# Patient Record
Sex: FEMALE | Race: WHITE | NOT HISPANIC OR LATINO | Employment: OTHER | URBAN - METROPOLITAN AREA
[De-identification: names, ages, dates, MRNs, and addresses within clinical notes are randomized per-mention and may not be internally consistent; named-entity substitution may affect disease eponyms.]

---

## 2021-01-01 ENCOUNTER — APPOINTMENT (OUTPATIENT)
Dept: NEUROLOGY | Facility: HOSPITAL | Age: 76
End: 2021-01-01

## 2021-01-01 ENCOUNTER — APPOINTMENT (OUTPATIENT)
Dept: GENERAL RADIOLOGY | Facility: HOSPITAL | Age: 76
End: 2021-01-01

## 2021-01-01 ENCOUNTER — ANESTHESIA EVENT (OUTPATIENT)
Dept: PERIOP | Facility: HOSPITAL | Age: 76
End: 2021-01-01

## 2021-01-01 ENCOUNTER — APPOINTMENT (OUTPATIENT)
Dept: CT IMAGING | Facility: HOSPITAL | Age: 76
End: 2021-01-01

## 2021-01-01 ENCOUNTER — ANESTHESIA (OUTPATIENT)
Dept: PERIOP | Facility: HOSPITAL | Age: 76
End: 2021-01-01

## 2021-01-01 ENCOUNTER — APPOINTMENT (OUTPATIENT)
Dept: CARDIOLOGY | Facility: HOSPITAL | Age: 76
End: 2021-01-01

## 2021-01-01 ENCOUNTER — APPOINTMENT (OUTPATIENT)
Dept: MRI IMAGING | Facility: HOSPITAL | Age: 76
End: 2021-01-01

## 2021-01-01 ENCOUNTER — HOSPITAL ENCOUNTER (INPATIENT)
Facility: HOSPITAL | Age: 76
LOS: 30 days | Discharge: NURSING, REHAB OR LONG-TERM CARE | End: 2021-02-07
Attending: EMERGENCY MEDICINE | Admitting: INTERNAL MEDICINE

## 2021-01-01 ENCOUNTER — HOSPITAL ENCOUNTER (INPATIENT)
Facility: HOSPITAL | Age: 76
LOS: 4 days | End: 2021-02-12
Attending: INTERNAL MEDICINE | Admitting: INTERNAL MEDICINE

## 2021-01-01 VITALS
DIASTOLIC BLOOD PRESSURE: 46 MMHG | RESPIRATION RATE: 42 BRPM | HEIGHT: 66 IN | BODY MASS INDEX: 39.7 KG/M2 | OXYGEN SATURATION: 94 % | HEART RATE: 105 BPM | WEIGHT: 247 LBS | SYSTOLIC BLOOD PRESSURE: 117 MMHG | TEMPERATURE: 97.3 F

## 2021-01-01 VITALS
HEART RATE: 105 BPM | HEIGHT: 66 IN | TEMPERATURE: 97.6 F | SYSTOLIC BLOOD PRESSURE: 138 MMHG | OXYGEN SATURATION: 62 % | DIASTOLIC BLOOD PRESSURE: 72 MMHG | RESPIRATION RATE: 28 BRPM | WEIGHT: 262.35 LBS | BODY MASS INDEX: 42.16 KG/M2

## 2021-01-01 DIAGNOSIS — J93.9 PNEUMOTHORAX, RIGHT: ICD-10-CM

## 2021-01-01 DIAGNOSIS — U07.1 PNEUMONIA DUE TO COVID-19 VIRUS: ICD-10-CM

## 2021-01-01 DIAGNOSIS — J12.82 PNEUMONIA DUE TO COVID-19 VIRUS: ICD-10-CM

## 2021-01-01 DIAGNOSIS — J96.01 ACUTE HYPOXEMIC RESPIRATORY FAILURE (HCC): Primary | ICD-10-CM

## 2021-01-01 DIAGNOSIS — J93.9 PNEUMOTHORAX, LEFT: ICD-10-CM

## 2021-01-01 LAB
1,3 BETA GLUCAN SER-MCNC: 31 PG/ML
ABO GROUP BLD: NORMAL
ALBUMIN SERPL-MCNC: 2.1 G/DL (ref 3.5–5.2)
ALBUMIN SERPL-MCNC: 2.2 G/DL (ref 3.5–5.2)
ALBUMIN SERPL-MCNC: 2.3 G/DL (ref 3.5–5.2)
ALBUMIN SERPL-MCNC: 2.4 G/DL (ref 3.5–5.2)
ALBUMIN SERPL-MCNC: 2.5 G/DL (ref 3.5–5.2)
ALBUMIN SERPL-MCNC: 2.6 G/DL (ref 3.5–5.2)
ALBUMIN SERPL-MCNC: 2.7 G/DL (ref 3.5–5.2)
ALBUMIN SERPL-MCNC: 2.8 G/DL (ref 3.5–5.2)
ALBUMIN SERPL-MCNC: 2.9 G/DL (ref 3.5–5.2)
ALBUMIN SERPL-MCNC: 3 G/DL (ref 3.5–5.2)
ALBUMIN SERPL-MCNC: 3.1 G/DL (ref 3.5–5.2)
ALBUMIN SERPL-MCNC: 3.5 G/DL (ref 3.5–5.2)
ALBUMIN SERPL-MCNC: 3.7 G/DL (ref 3.5–5.2)
ALBUMIN/GLOB SERPL: 0.6 G/DL
ALBUMIN/GLOB SERPL: 0.7 G/DL
ALBUMIN/GLOB SERPL: 0.8 G/DL
ALBUMIN/GLOB SERPL: 0.8 G/DL
ALBUMIN/GLOB SERPL: 0.9 G/DL
ALBUMIN/GLOB SERPL: 1 G/DL
ALBUMIN/GLOB SERPL: 1.1 G/DL
ALBUMIN/GLOB SERPL: 1.2 G/DL
ALBUMIN/GLOB SERPL: 1.3 G/DL
ALBUMIN/GLOB SERPL: 1.4 G/DL
ALBUMIN/GLOB SERPL: 1.5 G/DL
ALBUMIN/GLOB SERPL: 1.6 G/DL
ALBUMIN/GLOB SERPL: 1.8 G/DL
ALP SERPL-CCNC: 102 U/L (ref 39–117)
ALP SERPL-CCNC: 102 U/L (ref 39–117)
ALP SERPL-CCNC: 103 U/L (ref 39–117)
ALP SERPL-CCNC: 112 U/L (ref 39–117)
ALP SERPL-CCNC: 116 U/L (ref 39–117)
ALP SERPL-CCNC: 34 U/L (ref 39–117)
ALP SERPL-CCNC: 37 U/L (ref 39–117)
ALP SERPL-CCNC: 38 U/L (ref 39–117)
ALP SERPL-CCNC: 38 U/L (ref 39–117)
ALP SERPL-CCNC: 39 U/L (ref 39–117)
ALP SERPL-CCNC: 40 U/L (ref 39–117)
ALP SERPL-CCNC: 41 U/L (ref 39–117)
ALP SERPL-CCNC: 43 U/L (ref 39–117)
ALP SERPL-CCNC: 44 U/L (ref 39–117)
ALP SERPL-CCNC: 49 U/L (ref 39–117)
ALP SERPL-CCNC: 51 U/L (ref 39–117)
ALP SERPL-CCNC: 52 U/L (ref 39–117)
ALP SERPL-CCNC: 53 U/L (ref 39–117)
ALP SERPL-CCNC: 54 U/L (ref 39–117)
ALP SERPL-CCNC: 54 U/L (ref 39–117)
ALP SERPL-CCNC: 55 U/L (ref 39–117)
ALP SERPL-CCNC: 56 U/L (ref 39–117)
ALP SERPL-CCNC: 57 U/L (ref 39–117)
ALP SERPL-CCNC: 58 U/L (ref 39–117)
ALP SERPL-CCNC: 61 U/L (ref 39–117)
ALP SERPL-CCNC: 62 U/L (ref 39–117)
ALP SERPL-CCNC: 62 U/L (ref 39–117)
ALP SERPL-CCNC: 63 U/L (ref 39–117)
ALP SERPL-CCNC: 64 U/L (ref 39–117)
ALP SERPL-CCNC: 64 U/L (ref 39–117)
ALP SERPL-CCNC: 65 U/L (ref 39–117)
ALP SERPL-CCNC: 66 U/L (ref 39–117)
ALP SERPL-CCNC: 67 U/L (ref 39–117)
ALP SERPL-CCNC: 75 U/L (ref 39–117)
ALP SERPL-CCNC: 92 U/L (ref 39–117)
ALP SERPL-CCNC: 93 U/L (ref 39–117)
ALP SERPL-CCNC: 98 U/L (ref 39–117)
ALT SERPL W P-5'-P-CCNC: 10 U/L (ref 1–33)
ALT SERPL W P-5'-P-CCNC: 11 U/L (ref 1–33)
ALT SERPL W P-5'-P-CCNC: 12 U/L (ref 1–33)
ALT SERPL W P-5'-P-CCNC: 13 U/L (ref 1–33)
ALT SERPL W P-5'-P-CCNC: 14 U/L (ref 1–33)
ALT SERPL W P-5'-P-CCNC: 15 U/L (ref 1–33)
ALT SERPL W P-5'-P-CCNC: 16 U/L (ref 1–33)
ALT SERPL W P-5'-P-CCNC: 17 U/L (ref 1–33)
ALT SERPL W P-5'-P-CCNC: 18 U/L (ref 1–33)
ALT SERPL W P-5'-P-CCNC: 19 U/L (ref 1–33)
ALT SERPL W P-5'-P-CCNC: 19 U/L (ref 1–33)
ALT SERPL W P-5'-P-CCNC: 20 U/L (ref 1–33)
ALT SERPL W P-5'-P-CCNC: 20 U/L (ref 1–33)
ALT SERPL W P-5'-P-CCNC: 21 U/L (ref 1–33)
ALT SERPL W P-5'-P-CCNC: 24 U/L (ref 1–33)
ALT SERPL W P-5'-P-CCNC: 287 U/L (ref 1–33)
ALT SERPL W P-5'-P-CCNC: 384 U/L (ref 1–33)
ALT SERPL W P-5'-P-CCNC: 432 U/L (ref 1–33)
ALT SERPL W P-5'-P-CCNC: 520 U/L (ref 1–33)
AMMONIA BLD-SCNC: 44 UMOL/L (ref 11–51)
ANION GAP SERPL CALCULATED.3IONS-SCNC: 10 MMOL/L (ref 5–15)
ANION GAP SERPL CALCULATED.3IONS-SCNC: 11 MMOL/L (ref 5–15)
ANION GAP SERPL CALCULATED.3IONS-SCNC: 12 MMOL/L (ref 5–15)
ANION GAP SERPL CALCULATED.3IONS-SCNC: 13 MMOL/L (ref 5–15)
ANION GAP SERPL CALCULATED.3IONS-SCNC: 14 MMOL/L (ref 5–15)
ANION GAP SERPL CALCULATED.3IONS-SCNC: 14 MMOL/L (ref 5–15)
ANION GAP SERPL CALCULATED.3IONS-SCNC: 15 MMOL/L (ref 5–15)
ANION GAP SERPL CALCULATED.3IONS-SCNC: 16 MMOL/L (ref 5–15)
ANION GAP SERPL CALCULATED.3IONS-SCNC: 19 MMOL/L (ref 5–15)
ANION GAP SERPL CALCULATED.3IONS-SCNC: 6 MMOL/L (ref 5–15)
ANION GAP SERPL CALCULATED.3IONS-SCNC: 7 MMOL/L (ref 5–15)
ANION GAP SERPL CALCULATED.3IONS-SCNC: 8 MMOL/L (ref 5–15)
ANION GAP SERPL CALCULATED.3IONS-SCNC: 9 MMOL/L (ref 5–15)
AORTIC DIMENSIONLESS INDEX: 0.6 (DI)
APTT PPP: 30.2 SECONDS (ref 24–37)
ARTERIAL PATENCY WRIST A: ABNORMAL
AST SERPL-CCNC: 12 U/L (ref 1–32)
AST SERPL-CCNC: 13 U/L (ref 1–32)
AST SERPL-CCNC: 14 U/L (ref 1–32)
AST SERPL-CCNC: 15 U/L (ref 1–32)
AST SERPL-CCNC: 16 U/L (ref 1–32)
AST SERPL-CCNC: 17 U/L (ref 1–32)
AST SERPL-CCNC: 18 U/L (ref 1–32)
AST SERPL-CCNC: 19 U/L (ref 1–32)
AST SERPL-CCNC: 19 U/L (ref 1–32)
AST SERPL-CCNC: 20 U/L (ref 1–32)
AST SERPL-CCNC: 20 U/L (ref 1–32)
AST SERPL-CCNC: 21 U/L (ref 1–32)
AST SERPL-CCNC: 21 U/L (ref 1–32)
AST SERPL-CCNC: 22 U/L (ref 1–32)
AST SERPL-CCNC: 23 U/L (ref 1–32)
AST SERPL-CCNC: 245 U/L (ref 1–32)
AST SERPL-CCNC: 25 U/L (ref 1–32)
AST SERPL-CCNC: 26 U/L (ref 1–32)
AST SERPL-CCNC: 27 U/L (ref 1–32)
AST SERPL-CCNC: 30 U/L (ref 1–32)
AST SERPL-CCNC: 307 U/L (ref 1–32)
AST SERPL-CCNC: 35 U/L (ref 1–32)
AST SERPL-CCNC: 39 U/L (ref 1–32)
AST SERPL-CCNC: 44 U/L (ref 1–32)
AST SERPL-CCNC: 605 U/L (ref 1–32)
AST SERPL-CCNC: 648 U/L (ref 1–32)
ATMOSPHERIC PRESS: ABNORMAL MM[HG]
AV LVOT PEAK GRADIENT: 25 MMHG
B PARAPERT DNA SPEC QL NAA+PROBE: NOT DETECTED
B PERT DNA SPEC QL NAA+PROBE: NOT DETECTED
BACTERIA SPEC AEROBE CULT: NO GROWTH
BACTERIA SPEC AEROBE CULT: NO GROWTH
BACTERIA SPEC AEROBE CULT: NORMAL
BACTERIA SPEC RESP CULT: ABNORMAL
BACTERIA UR QL AUTO: ABNORMAL /HPF
BASE EXCESS BLDA CALC-SCNC: -0.1 MMOL/L (ref 0–2)
BASE EXCESS BLDA CALC-SCNC: -0.2 MMOL/L (ref 0–2)
BASE EXCESS BLDA CALC-SCNC: -0.2 MMOL/L (ref 0–2)
BASE EXCESS BLDA CALC-SCNC: -0.4 MMOL/L (ref 0–2)
BASE EXCESS BLDA CALC-SCNC: -0.8 MMOL/L (ref 0–2)
BASE EXCESS BLDA CALC-SCNC: -6.4 MMOL/L (ref 0–2)
BASE EXCESS BLDA CALC-SCNC: -7.4 MMOL/L (ref 0–2)
BASE EXCESS BLDA CALC-SCNC: -8 MMOL/L (ref 0–2)
BASE EXCESS BLDA CALC-SCNC: -8.5 MMOL/L (ref 0–2)
BASE EXCESS BLDA CALC-SCNC: -9.1 MMOL/L (ref 0–2)
BASE EXCESS BLDA CALC-SCNC: 0.2 MMOL/L (ref 0–2)
BASE EXCESS BLDA CALC-SCNC: 0.9 MMOL/L (ref 0–2)
BASE EXCESS BLDA CALC-SCNC: 3.4 MMOL/L (ref 0–2)
BASE EXCESS BLDA CALC-SCNC: 4.8 MMOL/L (ref 0–2)
BASE EXCESS BLDA CALC-SCNC: 7 MMOL/L (ref 0–2)
BASOPHILS # BLD AUTO: 0.01 10*3/MM3 (ref 0–0.2)
BASOPHILS # BLD AUTO: 0.02 10*3/MM3 (ref 0–0.2)
BASOPHILS # BLD AUTO: 0.03 10*3/MM3 (ref 0–0.2)
BASOPHILS # BLD AUTO: 0.04 10*3/MM3 (ref 0–0.2)
BASOPHILS # BLD AUTO: 0.04 10*3/MM3 (ref 0–0.2)
BASOPHILS # BLD AUTO: 0.06 10*3/MM3 (ref 0–0.2)
BASOPHILS # BLD AUTO: 0.06 10*3/MM3 (ref 0–0.2)
BASOPHILS # BLD AUTO: 0.07 10*3/MM3 (ref 0–0.2)
BASOPHILS # BLD AUTO: 0.07 10*3/MM3 (ref 0–0.2)
BASOPHILS # BLD AUTO: 0.09 10*3/MM3 (ref 0–0.2)
BASOPHILS # BLD MANUAL: 0 10*3/MM3 (ref 0–0.2)
BASOPHILS # BLD MANUAL: 0.18 10*3/MM3 (ref 0–0.2)
BASOPHILS NFR BLD AUTO: 0 % (ref 0–1.5)
BASOPHILS NFR BLD AUTO: 0.1 % (ref 0–1.5)
BASOPHILS NFR BLD AUTO: 0.1 % (ref 0–1.5)
BASOPHILS NFR BLD AUTO: 0.2 % (ref 0–1.5)
BASOPHILS NFR BLD AUTO: 0.3 % (ref 0–1.5)
BASOPHILS NFR BLD AUTO: 0.4 % (ref 0–1.5)
BASOPHILS NFR BLD AUTO: 0.6 % (ref 0–1.5)
BASOPHILS NFR BLD AUTO: 0.8 % (ref 0–1.5)
BASOPHILS NFR BLD AUTO: 1 % (ref 0–1.5)
BDY SITE: ABNORMAL
BH BB BLOOD EXPIRATION DATE: NORMAL
BH BB BLOOD TYPE BARCODE: 6200
BH BB DISPENSE STATUS: NORMAL
BH BB PRODUCT CODE: NORMAL
BH BB UNIT NUMBER: NORMAL
BH CV ECHO MEAS - AI DEC SLOPE: 415.6 CM/SEC^2
BH CV ECHO MEAS - AI MAX PG: 61.9 MMHG
BH CV ECHO MEAS - AI MAX VEL: 393.4 CM/SEC
BH CV ECHO MEAS - AI P1/2T: 277.2 MSEC
BH CV ECHO MEAS - AO MAX PG (FULL): 28.8 MMHG
BH CV ECHO MEAS - AO MAX PG: 46 MMHG
BH CV ECHO MEAS - AO MEAN PG (FULL): 20 MMHG
BH CV ECHO MEAS - AO MEAN PG: 27 MMHG
BH CV ECHO MEAS - AO ROOT AREA (BSA CORRECTED): 1.4
BH CV ECHO MEAS - AO ROOT AREA: 7.1 CM^2
BH CV ECHO MEAS - AO ROOT DIAM: 3 CM
BH CV ECHO MEAS - AO V2 MAX: 339 CM/SEC
BH CV ECHO MEAS - AO V2 MEAN: 240 CM/SEC
BH CV ECHO MEAS - AO V2 VTI: 64 CM
BH CV ECHO MEAS - ASC AORTA: 3 CM
BH CV ECHO MEAS - AVA(I,A): 1.9 CM^2
BH CV ECHO MEAS - AVA(I,D): 1.9 CM^2
BH CV ECHO MEAS - AVA(V,A): 1.9 CM^2
BH CV ECHO MEAS - AVA(V,D): 1.9 CM^2
BH CV ECHO MEAS - BSA(HAYCOCK): 2.2 M^2
BH CV ECHO MEAS - BSA(HAYCOCK): 2.3 M^2
BH CV ECHO MEAS - BSA(HAYCOCK): 2.3 M^2
BH CV ECHO MEAS - BSA: 2.1 M^2
BH CV ECHO MEAS - BSA: 2.2 M^2
BH CV ECHO MEAS - BSA: 2.2 M^2
BH CV ECHO MEAS - BZI_BMI: 36.8 KILOGRAMS/M^2
BH CV ECHO MEAS - BZI_BMI: 39.9 KILOGRAMS/M^2
BH CV ECHO MEAS - BZI_BMI: 39.9 KILOGRAMS/M^2
BH CV ECHO MEAS - BZI_METRIC_HEIGHT: 167.6 CM
BH CV ECHO MEAS - BZI_METRIC_WEIGHT: 103.4 KG
BH CV ECHO MEAS - BZI_METRIC_WEIGHT: 112 KG
BH CV ECHO MEAS - BZI_METRIC_WEIGHT: 112 KG
BH CV ECHO MEAS - EDV(CUBED): 64.5 ML
BH CV ECHO MEAS - EDV(MOD-SP2): 43 ML
BH CV ECHO MEAS - EDV(MOD-SP4): 92 ML
BH CV ECHO MEAS - EDV(TEICH): 70.4 ML
BH CV ECHO MEAS - EF(CUBED): 66 %
BH CV ECHO MEAS - EF(MOD-BP): 67 %
BH CV ECHO MEAS - EF(MOD-SP2): 67.4 %
BH CV ECHO MEAS - EF(MOD-SP4): 64.1 %
BH CV ECHO MEAS - EF(TEICH): 58 %
BH CV ECHO MEAS - ESV(CUBED): 22 ML
BH CV ECHO MEAS - ESV(MOD-SP2): 14 ML
BH CV ECHO MEAS - ESV(MOD-SP4): 33 ML
BH CV ECHO MEAS - ESV(TEICH): 29.6 ML
BH CV ECHO MEAS - FS: 30.2 %
BH CV ECHO MEAS - IVS/LVPW: 0.96
BH CV ECHO MEAS - IVSD: 0.87 CM
BH CV ECHO MEAS - LA DIMENSION: 4 CM
BH CV ECHO MEAS - LA/AO: 1.3
BH CV ECHO MEAS - LAD MAJOR: 5.7 CM
BH CV ECHO MEAS - LAT PEAK E' VEL: 6.7 CM/SEC
BH CV ECHO MEAS - LATERAL E/E' RATIO: 17.9
BH CV ECHO MEAS - LV DIASTOLIC VOL/BSA (35-75): 42 ML/M^2
BH CV ECHO MEAS - LV MASS(C)D: 108.9 GRAMS
BH CV ECHO MEAS - LV MASS(C)DI: 49.8 GRAMS/M^2
BH CV ECHO MEAS - LV MAX PG: 17.1 MMHG
BH CV ECHO MEAS - LV MEAN PG: 7 MMHG
BH CV ECHO MEAS - LV SYSTOLIC VOL/BSA (12-30): 15.1 ML/M^2
BH CV ECHO MEAS - LV V1 MAX: 207 CM/SEC
BH CV ECHO MEAS - LV V1 MEAN: 116 CM/SEC
BH CV ECHO MEAS - LV V1 VTI: 38.2 CM
BH CV ECHO MEAS - LVIDD: 4 CM
BH CV ECHO MEAS - LVIDS: 2.8 CM
BH CV ECHO MEAS - LVLD AP2: 5.3 CM
BH CV ECHO MEAS - LVLD AP4: 7.6 CM
BH CV ECHO MEAS - LVLS AP2: 4.1 CM
BH CV ECHO MEAS - LVLS AP4: 5.8 CM
BH CV ECHO MEAS - LVOT AREA (M): 3.1 CM^2
BH CV ECHO MEAS - LVOT AREA: 3.1 CM^2
BH CV ECHO MEAS - LVOT DIAM: 2 CM
BH CV ECHO MEAS - LVPWD: 0.91 CM
BH CV ECHO MEAS - MED PEAK E' VEL: 5.5 CM/SEC
BH CV ECHO MEAS - MEDIAL E/E' RATIO: 21.9
BH CV ECHO MEAS - MV A MAX VEL: 151 CM/SEC
BH CV ECHO MEAS - MV DEC SLOPE: 439.5 CM/SEC^2
BH CV ECHO MEAS - MV DEC TIME: 0.3 SEC
BH CV ECHO MEAS - MV E MAX VEL: 120 CM/SEC
BH CV ECHO MEAS - MV E/A: 0.79
BH CV ECHO MEAS - MV MAX PG: 17.1 MMHG
BH CV ECHO MEAS - MV MEAN PG: 7 MMHG
BH CV ECHO MEAS - MV P1/2T MAX VEL: 141 CM/SEC
BH CV ECHO MEAS - MV P1/2T: 94 MSEC
BH CV ECHO MEAS - MV V2 MAX: 207 CM/SEC
BH CV ECHO MEAS - MV V2 MEAN: 130 CM/SEC
BH CV ECHO MEAS - MV V2 VTI: 38.9 CM
BH CV ECHO MEAS - MVA P1/2T LCG: 1.6 CM^2
BH CV ECHO MEAS - MVA(P1/2T): 2.3 CM^2
BH CV ECHO MEAS - MVA(VTI): 3.1 CM^2
BH CV ECHO MEAS - PA ACC SLOPE: 953 CM/SEC^2
BH CV ECHO MEAS - PA ACC TIME: 0.1 SEC
BH CV ECHO MEAS - PA MAX PG: 10 MMHG
BH CV ECHO MEAS - PA PR(ACCEL): 36.3 MMHG
BH CV ECHO MEAS - PA V2 MAX: 158 CM/SEC
BH CV ECHO MEAS - PAPD(PI EDV): 6 MMHG
BH CV ECHO MEAS - PI END-D VEL: 126 CM/SEC
BH CV ECHO MEAS - PULM A REVS VEL: 33.3 CM/SEC
BH CV ECHO MEAS - PULM DIAS VEL: 41.6 CM/SEC
BH CV ECHO MEAS - PULM S/D: 1.4
BH CV ECHO MEAS - PULM SYS VEL: 59.8 CM/SEC
BH CV ECHO MEAS - RAP SYSTOLE: 15 MMHG
BH CV ECHO MEAS - RVSP: 62 MMHG
BH CV ECHO MEAS - SI(AO): 206.8 ML/M^2
BH CV ECHO MEAS - SI(CUBED): 19.4 ML/M^2
BH CV ECHO MEAS - SI(LVOT): 54.9 ML/M^2
BH CV ECHO MEAS - SI(MOD-SP2): 13.3 ML/M^2
BH CV ECHO MEAS - SI(MOD-SP4): 27 ML/M^2
BH CV ECHO MEAS - SI(TEICH): 18.7 ML/M^2
BH CV ECHO MEAS - SV(AO): 452.4 ML
BH CV ECHO MEAS - SV(CUBED): 42.5 ML
BH CV ECHO MEAS - SV(LVOT): 120 ML
BH CV ECHO MEAS - SV(MOD-SP2): 29 ML
BH CV ECHO MEAS - SV(MOD-SP4): 59 ML
BH CV ECHO MEAS - SV(TEICH): 40.9 ML
BH CV ECHO MEAS - TAPSE (>1.6): 2.5 CM
BH CV ECHO MEAS - TR MAX PG: 47 MMHG
BH CV ECHO MEAS - TR MAX VEL: 343 CM/SEC
BH CV ECHO MEASUREMENTS AVERAGE E/E' RATIO: 19.67
BH CV LOW VAS LEFT GASTRONEMIUS VESSEL: 1
BH CV LOW VAS LEFT PERONEAL VESSEL: 1
BH CV LOWER VASCULAR LEFT COMMON FEMORAL AUGMENT: NORMAL
BH CV LOWER VASCULAR LEFT COMMON FEMORAL AUGMENT: NORMAL
BH CV LOWER VASCULAR LEFT COMMON FEMORAL COMPRESS: NORMAL
BH CV LOWER VASCULAR LEFT COMMON FEMORAL COMPRESS: NORMAL
BH CV LOWER VASCULAR LEFT COMMON FEMORAL PHASIC: NORMAL
BH CV LOWER VASCULAR LEFT COMMON FEMORAL PHASIC: NORMAL
BH CV LOWER VASCULAR LEFT COMMON FEMORAL SPONT: NORMAL
BH CV LOWER VASCULAR LEFT COMMON FEMORAL SPONT: NORMAL
BH CV LOWER VASCULAR LEFT DISTAL FEMORAL AUGMENT: NORMAL
BH CV LOWER VASCULAR LEFT DISTAL FEMORAL AUGMENT: NORMAL
BH CV LOWER VASCULAR LEFT DISTAL FEMORAL COMPRESS: NORMAL
BH CV LOWER VASCULAR LEFT DISTAL FEMORAL COMPRESS: NORMAL
BH CV LOWER VASCULAR LEFT DISTAL FEMORAL PHASIC: NORMAL
BH CV LOWER VASCULAR LEFT DISTAL FEMORAL PHASIC: NORMAL
BH CV LOWER VASCULAR LEFT DISTAL FEMORAL SPONT: NORMAL
BH CV LOWER VASCULAR LEFT DISTAL FEMORAL SPONT: NORMAL
BH CV LOWER VASCULAR LEFT GASTRONEMIUS AUGMENT: NORMAL
BH CV LOWER VASCULAR LEFT GASTRONEMIUS COMPRESS: NORMAL
BH CV LOWER VASCULAR LEFT GASTRONEMIUS COMPRESS: NORMAL
BH CV LOWER VASCULAR LEFT GASTRONEMIUS THROMBUS: NORMAL
BH CV LOWER VASCULAR LEFT GREATER SAPH AK COMPRESS: NORMAL
BH CV LOWER VASCULAR LEFT GREATER SAPH AK COMPRESS: NORMAL
BH CV LOWER VASCULAR LEFT GREATER SAPH BK COMPRESS: NORMAL
BH CV LOWER VASCULAR LEFT GREATER SAPH BK COMPRESS: NORMAL
BH CV LOWER VASCULAR LEFT LESSER SAPH COMPRESS: NORMAL
BH CV LOWER VASCULAR LEFT LESSER SAPH COMPRESS: NORMAL
BH CV LOWER VASCULAR LEFT MID FEMORAL AUGMENT: NORMAL
BH CV LOWER VASCULAR LEFT MID FEMORAL AUGMENT: NORMAL
BH CV LOWER VASCULAR LEFT MID FEMORAL COMPRESS: NORMAL
BH CV LOWER VASCULAR LEFT MID FEMORAL COMPRESS: NORMAL
BH CV LOWER VASCULAR LEFT MID FEMORAL PHASIC: NORMAL
BH CV LOWER VASCULAR LEFT MID FEMORAL PHASIC: NORMAL
BH CV LOWER VASCULAR LEFT MID FEMORAL SPONT: NORMAL
BH CV LOWER VASCULAR LEFT MID FEMORAL SPONT: NORMAL
BH CV LOWER VASCULAR LEFT PERONEAL AUGMENT: NORMAL
BH CV LOWER VASCULAR LEFT PERONEAL COMPRESS: NORMAL
BH CV LOWER VASCULAR LEFT PERONEAL COMPRESS: NORMAL
BH CV LOWER VASCULAR LEFT PERONEAL THROMBUS: NORMAL
BH CV LOWER VASCULAR LEFT POPLITEAL AUGMENT: NORMAL
BH CV LOWER VASCULAR LEFT POPLITEAL AUGMENT: NORMAL
BH CV LOWER VASCULAR LEFT POPLITEAL COMPRESS: NORMAL
BH CV LOWER VASCULAR LEFT POPLITEAL COMPRESS: NORMAL
BH CV LOWER VASCULAR LEFT POPLITEAL PHASIC: NORMAL
BH CV LOWER VASCULAR LEFT POPLITEAL PHASIC: NORMAL
BH CV LOWER VASCULAR LEFT POPLITEAL SPONT: NORMAL
BH CV LOWER VASCULAR LEFT POPLITEAL SPONT: NORMAL
BH CV LOWER VASCULAR LEFT POSTERIOR TIBIAL AUGMENT: NORMAL
BH CV LOWER VASCULAR LEFT POSTERIOR TIBIAL COMPRESS: NORMAL
BH CV LOWER VASCULAR LEFT POSTERIOR TIBIAL COMPRESS: NORMAL
BH CV LOWER VASCULAR LEFT PROFUNDA FEMORAL AUGMENT: NORMAL
BH CV LOWER VASCULAR LEFT PROFUNDA FEMORAL AUGMENT: NORMAL
BH CV LOWER VASCULAR LEFT PROFUNDA FEMORAL COMPRESS: NORMAL
BH CV LOWER VASCULAR LEFT PROFUNDA FEMORAL COMPRESS: NORMAL
BH CV LOWER VASCULAR LEFT PROFUNDA FEMORAL PHASIC: NORMAL
BH CV LOWER VASCULAR LEFT PROFUNDA FEMORAL PHASIC: NORMAL
BH CV LOWER VASCULAR LEFT PROFUNDA FEMORAL SPONT: NORMAL
BH CV LOWER VASCULAR LEFT PROFUNDA FEMORAL SPONT: NORMAL
BH CV LOWER VASCULAR LEFT PROXIMAL FEMORAL AUGMENT: NORMAL
BH CV LOWER VASCULAR LEFT PROXIMAL FEMORAL AUGMENT: NORMAL
BH CV LOWER VASCULAR LEFT PROXIMAL FEMORAL COMPRESS: NORMAL
BH CV LOWER VASCULAR LEFT PROXIMAL FEMORAL COMPRESS: NORMAL
BH CV LOWER VASCULAR LEFT PROXIMAL FEMORAL PHASIC: NORMAL
BH CV LOWER VASCULAR LEFT PROXIMAL FEMORAL PHASIC: NORMAL
BH CV LOWER VASCULAR LEFT PROXIMAL FEMORAL SPONT: NORMAL
BH CV LOWER VASCULAR LEFT PROXIMAL FEMORAL SPONT: NORMAL
BH CV LOWER VASCULAR LEFT SAPHENOFEMORAL JUNCTION AUGMENT: NORMAL
BH CV LOWER VASCULAR LEFT SAPHENOFEMORAL JUNCTION AUGMENT: NORMAL
BH CV LOWER VASCULAR LEFT SAPHENOFEMORAL JUNCTION COMPRESS: NORMAL
BH CV LOWER VASCULAR LEFT SAPHENOFEMORAL JUNCTION COMPRESS: NORMAL
BH CV LOWER VASCULAR LEFT SAPHENOFEMORAL JUNCTION PHASIC: NORMAL
BH CV LOWER VASCULAR LEFT SAPHENOFEMORAL JUNCTION PHASIC: NORMAL
BH CV LOWER VASCULAR LEFT SAPHENOFEMORAL JUNCTION SPONT: NORMAL
BH CV LOWER VASCULAR LEFT SAPHENOFEMORAL JUNCTION SPONT: NORMAL
BH CV LOWER VASCULAR LEFT SOLEAL COMPRESS: NORMAL
BH CV LOWER VASCULAR RIGHT COMMON FEMORAL AUGMENT: NORMAL
BH CV LOWER VASCULAR RIGHT COMMON FEMORAL AUGMENT: NORMAL
BH CV LOWER VASCULAR RIGHT COMMON FEMORAL COMPRESS: NORMAL
BH CV LOWER VASCULAR RIGHT COMMON FEMORAL COMPRESS: NORMAL
BH CV LOWER VASCULAR RIGHT COMMON FEMORAL PHASIC: NORMAL
BH CV LOWER VASCULAR RIGHT COMMON FEMORAL PHASIC: NORMAL
BH CV LOWER VASCULAR RIGHT COMMON FEMORAL SPONT: NORMAL
BH CV LOWER VASCULAR RIGHT COMMON FEMORAL SPONT: NORMAL
BH CV LOWER VASCULAR RIGHT DISTAL FEMORAL AUGMENT: NORMAL
BH CV LOWER VASCULAR RIGHT DISTAL FEMORAL AUGMENT: NORMAL
BH CV LOWER VASCULAR RIGHT DISTAL FEMORAL COMPRESS: NORMAL
BH CV LOWER VASCULAR RIGHT DISTAL FEMORAL COMPRESS: NORMAL
BH CV LOWER VASCULAR RIGHT DISTAL FEMORAL PHASIC: NORMAL
BH CV LOWER VASCULAR RIGHT DISTAL FEMORAL PHASIC: NORMAL
BH CV LOWER VASCULAR RIGHT DISTAL FEMORAL SPONT: NORMAL
BH CV LOWER VASCULAR RIGHT DISTAL FEMORAL SPONT: NORMAL
BH CV LOWER VASCULAR RIGHT GASTRONEMIUS COMPRESS: NORMAL
BH CV LOWER VASCULAR RIGHT GASTRONEMIUS COMPRESS: NORMAL
BH CV LOWER VASCULAR RIGHT GREATER SAPH AK COMPRESS: NORMAL
BH CV LOWER VASCULAR RIGHT GREATER SAPH AK COMPRESS: NORMAL
BH CV LOWER VASCULAR RIGHT GREATER SAPH BK COMPRESS: NORMAL
BH CV LOWER VASCULAR RIGHT GREATER SAPH BK COMPRESS: NORMAL
BH CV LOWER VASCULAR RIGHT LESSER SAPH COMPRESS: NORMAL
BH CV LOWER VASCULAR RIGHT LESSER SAPH COMPRESS: NORMAL
BH CV LOWER VASCULAR RIGHT MID FEMORAL AUGMENT: NORMAL
BH CV LOWER VASCULAR RIGHT MID FEMORAL AUGMENT: NORMAL
BH CV LOWER VASCULAR RIGHT MID FEMORAL COMPRESS: NORMAL
BH CV LOWER VASCULAR RIGHT MID FEMORAL COMPRESS: NORMAL
BH CV LOWER VASCULAR RIGHT MID FEMORAL PHASIC: NORMAL
BH CV LOWER VASCULAR RIGHT MID FEMORAL PHASIC: NORMAL
BH CV LOWER VASCULAR RIGHT MID FEMORAL SPONT: NORMAL
BH CV LOWER VASCULAR RIGHT MID FEMORAL SPONT: NORMAL
BH CV LOWER VASCULAR RIGHT PERONEAL AUGMENT: NORMAL
BH CV LOWER VASCULAR RIGHT PERONEAL COMPRESS: NORMAL
BH CV LOWER VASCULAR RIGHT PERONEAL COMPRESS: NORMAL
BH CV LOWER VASCULAR RIGHT POPLITEAL AUGMENT: NORMAL
BH CV LOWER VASCULAR RIGHT POPLITEAL AUGMENT: NORMAL
BH CV LOWER VASCULAR RIGHT POPLITEAL COMPRESS: NORMAL
BH CV LOWER VASCULAR RIGHT POPLITEAL COMPRESS: NORMAL
BH CV LOWER VASCULAR RIGHT POPLITEAL PHASIC: NORMAL
BH CV LOWER VASCULAR RIGHT POPLITEAL PHASIC: NORMAL
BH CV LOWER VASCULAR RIGHT POPLITEAL SPONT: NORMAL
BH CV LOWER VASCULAR RIGHT POPLITEAL SPONT: NORMAL
BH CV LOWER VASCULAR RIGHT POSTERIOR TIBIAL AUGMENT: NORMAL
BH CV LOWER VASCULAR RIGHT POSTERIOR TIBIAL COMPRESS: NORMAL
BH CV LOWER VASCULAR RIGHT POSTERIOR TIBIAL COMPRESS: NORMAL
BH CV LOWER VASCULAR RIGHT PROFUNDA FEMORAL AUGMENT: NORMAL
BH CV LOWER VASCULAR RIGHT PROFUNDA FEMORAL AUGMENT: NORMAL
BH CV LOWER VASCULAR RIGHT PROFUNDA FEMORAL COMPRESS: NORMAL
BH CV LOWER VASCULAR RIGHT PROFUNDA FEMORAL COMPRESS: NORMAL
BH CV LOWER VASCULAR RIGHT PROFUNDA FEMORAL PHASIC: NORMAL
BH CV LOWER VASCULAR RIGHT PROFUNDA FEMORAL PHASIC: NORMAL
BH CV LOWER VASCULAR RIGHT PROFUNDA FEMORAL SPONT: NORMAL
BH CV LOWER VASCULAR RIGHT PROFUNDA FEMORAL SPONT: NORMAL
BH CV LOWER VASCULAR RIGHT PROXIMAL FEMORAL AUGMENT: NORMAL
BH CV LOWER VASCULAR RIGHT PROXIMAL FEMORAL AUGMENT: NORMAL
BH CV LOWER VASCULAR RIGHT PROXIMAL FEMORAL COMPRESS: NORMAL
BH CV LOWER VASCULAR RIGHT PROXIMAL FEMORAL COMPRESS: NORMAL
BH CV LOWER VASCULAR RIGHT PROXIMAL FEMORAL PHASIC: NORMAL
BH CV LOWER VASCULAR RIGHT PROXIMAL FEMORAL PHASIC: NORMAL
BH CV LOWER VASCULAR RIGHT PROXIMAL FEMORAL SPONT: NORMAL
BH CV LOWER VASCULAR RIGHT PROXIMAL FEMORAL SPONT: NORMAL
BH CV LOWER VASCULAR RIGHT SAPHENOFEMORAL JUNCTION AUGMENT: NORMAL
BH CV LOWER VASCULAR RIGHT SAPHENOFEMORAL JUNCTION AUGMENT: NORMAL
BH CV LOWER VASCULAR RIGHT SAPHENOFEMORAL JUNCTION COMPRESS: NORMAL
BH CV LOWER VASCULAR RIGHT SAPHENOFEMORAL JUNCTION COMPRESS: NORMAL
BH CV LOWER VASCULAR RIGHT SAPHENOFEMORAL JUNCTION PHASIC: NORMAL
BH CV LOWER VASCULAR RIGHT SAPHENOFEMORAL JUNCTION PHASIC: NORMAL
BH CV LOWER VASCULAR RIGHT SAPHENOFEMORAL JUNCTION SPONT: NORMAL
BH CV LOWER VASCULAR RIGHT SAPHENOFEMORAL JUNCTION SPONT: NORMAL
BH CV LOWER VASCULAR RIGHT SOLEAL COMPRESS: NORMAL
BH CV VAS BP LEFT ARM: NORMAL MMHG
BH CV XLRA - RV BASE: 4.5 CM
BH CV XLRA - RV LENGTH: 6.8 CM
BH CV XLRA - RV MID: 3.9 CM
BH CV XLRA - TDI S': 12.9 CM/SEC
BILIRUB SERPL-MCNC: 0.2 MG/DL (ref 0–1.2)
BILIRUB SERPL-MCNC: 0.3 MG/DL (ref 0–1.2)
BILIRUB SERPL-MCNC: 0.4 MG/DL (ref 0–1.2)
BILIRUB SERPL-MCNC: 0.6 MG/DL (ref 0–1.2)
BILIRUB SERPL-MCNC: <0.2 MG/DL (ref 0–1.2)
BILIRUB UR QL STRIP: NEGATIVE
BLD GP AB SCN SERPL QL: NEGATIVE
BLD GP AB SCN SERPL QL: NEGATIVE
BODY TEMPERATURE: 37 C
BUN SERPL-MCNC: 10 MG/DL (ref 8–23)
BUN SERPL-MCNC: 13 MG/DL (ref 8–23)
BUN SERPL-MCNC: 14 MG/DL (ref 8–23)
BUN SERPL-MCNC: 14 MG/DL (ref 8–23)
BUN SERPL-MCNC: 15 MG/DL (ref 8–23)
BUN SERPL-MCNC: 15 MG/DL (ref 8–23)
BUN SERPL-MCNC: 17 MG/DL (ref 8–23)
BUN SERPL-MCNC: 17 MG/DL (ref 8–23)
BUN SERPL-MCNC: 19 MG/DL (ref 8–23)
BUN SERPL-MCNC: 20 MG/DL (ref 8–23)
BUN SERPL-MCNC: 20 MG/DL (ref 8–23)
BUN SERPL-MCNC: 22 MG/DL (ref 8–23)
BUN SERPL-MCNC: 23 MG/DL (ref 8–23)
BUN SERPL-MCNC: 24 MG/DL (ref 8–23)
BUN SERPL-MCNC: 26 MG/DL (ref 8–23)
BUN SERPL-MCNC: 30 MG/DL (ref 8–23)
BUN SERPL-MCNC: 33 MG/DL (ref 8–23)
BUN SERPL-MCNC: 36 MG/DL (ref 8–23)
BUN SERPL-MCNC: 38 MG/DL (ref 8–23)
BUN SERPL-MCNC: 38 MG/DL (ref 8–23)
BUN SERPL-MCNC: 39 MG/DL (ref 8–23)
BUN SERPL-MCNC: 40 MG/DL (ref 8–23)
BUN SERPL-MCNC: 40 MG/DL (ref 8–23)
BUN SERPL-MCNC: 41 MG/DL (ref 8–23)
BUN SERPL-MCNC: 42 MG/DL (ref 8–23)
BUN SERPL-MCNC: 53 MG/DL (ref 8–23)
BUN SERPL-MCNC: 54 MG/DL (ref 8–23)
BUN SERPL-MCNC: 55 MG/DL (ref 8–23)
BUN SERPL-MCNC: 59 MG/DL (ref 8–23)
BUN SERPL-MCNC: 60 MG/DL (ref 8–23)
BUN SERPL-MCNC: 61 MG/DL (ref 8–23)
BUN SERPL-MCNC: 62 MG/DL (ref 8–23)
BUN SERPL-MCNC: 66 MG/DL (ref 8–23)
BUN SERPL-MCNC: 67 MG/DL (ref 8–23)
BUN SERPL-MCNC: 67 MG/DL (ref 8–23)
BUN SERPL-MCNC: 71 MG/DL (ref 8–23)
BUN SERPL-MCNC: 73 MG/DL (ref 8–23)
BUN SERPL-MCNC: 75 MG/DL (ref 8–23)
BUN SERPL-MCNC: 78 MG/DL (ref 8–23)
BUN SERPL-MCNC: 80 MG/DL (ref 8–23)
BUN SERPL-MCNC: 82 MG/DL (ref 8–23)
BUN SERPL-MCNC: 83 MG/DL (ref 8–23)
BUN/CREAT SERPL: 106.7 (ref 7–25)
BUN/CREAT SERPL: 13 (ref 7–25)
BUN/CREAT SERPL: 21.2 (ref 7–25)
BUN/CREAT SERPL: 24.1 (ref 7–25)
BUN/CREAT SERPL: 25.9 (ref 7–25)
BUN/CREAT SERPL: 27.7 (ref 7–25)
BUN/CREAT SERPL: 28.2 (ref 7–25)
BUN/CREAT SERPL: 28.3 (ref 7–25)
BUN/CREAT SERPL: 28.7 (ref 7–25)
BUN/CREAT SERPL: 28.8 (ref 7–25)
BUN/CREAT SERPL: 29.3 (ref 7–25)
BUN/CREAT SERPL: 29.3 (ref 7–25)
BUN/CREAT SERPL: 29.9 (ref 7–25)
BUN/CREAT SERPL: 30.2 (ref 7–25)
BUN/CREAT SERPL: 31.9 (ref 7–25)
BUN/CREAT SERPL: 32.2 (ref 7–25)
BUN/CREAT SERPL: 33.9 (ref 7–25)
BUN/CREAT SERPL: 34.6 (ref 7–25)
BUN/CREAT SERPL: 35.3 (ref 7–25)
BUN/CREAT SERPL: 37.4 (ref 7–25)
BUN/CREAT SERPL: 38.1 (ref 7–25)
BUN/CREAT SERPL: 46.9 (ref 7–25)
BUN/CREAT SERPL: 47 (ref 7–25)
BUN/CREAT SERPL: 50.8 (ref 7–25)
BUN/CREAT SERPL: 55 (ref 7–25)
BUN/CREAT SERPL: 58.9 (ref 7–25)
BUN/CREAT SERPL: 62.9 (ref 7–25)
BUN/CREAT SERPL: 73.2 (ref 7–25)
BUN/CREAT SERPL: 78.1 (ref 7–25)
BUN/CREAT SERPL: 79.8 (ref 7–25)
BUN/CREAT SERPL: 85.7 (ref 7–25)
BUN/CREAT SERPL: 88.8 (ref 7–25)
BUN/CREAT SERPL: 9.8 (ref 7–25)
BUN/CREAT SERPL: 90.2 (ref 7–25)
BUN/CREAT SERPL: 91 (ref 7–25)
BUN/CREAT SERPL: 93.7 (ref 7–25)
BURR CELLS BLD QL SMEAR: ABNORMAL
BURR CELLS BLD QL SMEAR: ABNORMAL
C DIFF TOX GENS STL QL NAA+PROBE: NOT DETECTED
C PNEUM DNA NPH QL NAA+NON-PROBE: NOT DETECTED
CA-I SERPL ISE-MCNC: 1.32 MMOL/L (ref 1.12–1.32)
CALCIUM SPEC-SCNC: 10 MG/DL (ref 8.6–10.5)
CALCIUM SPEC-SCNC: 8 MG/DL (ref 8.6–10.5)
CALCIUM SPEC-SCNC: 8.1 MG/DL (ref 8.6–10.5)
CALCIUM SPEC-SCNC: 8.2 MG/DL (ref 8.6–10.5)
CALCIUM SPEC-SCNC: 8.2 MG/DL (ref 8.6–10.5)
CALCIUM SPEC-SCNC: 8.3 MG/DL (ref 8.6–10.5)
CALCIUM SPEC-SCNC: 8.4 MG/DL (ref 8.6–10.5)
CALCIUM SPEC-SCNC: 8.5 MG/DL (ref 8.6–10.5)
CALCIUM SPEC-SCNC: 8.6 MG/DL (ref 8.6–10.5)
CALCIUM SPEC-SCNC: 8.6 MG/DL (ref 8.6–10.5)
CALCIUM SPEC-SCNC: 8.7 MG/DL (ref 8.6–10.5)
CALCIUM SPEC-SCNC: 8.8 MG/DL (ref 8.6–10.5)
CALCIUM SPEC-SCNC: 8.8 MG/DL (ref 8.6–10.5)
CALCIUM SPEC-SCNC: 8.9 MG/DL (ref 8.6–10.5)
CALCIUM SPEC-SCNC: 9 MG/DL (ref 8.6–10.5)
CALCIUM SPEC-SCNC: 9 MG/DL (ref 8.6–10.5)
CALCIUM SPEC-SCNC: 9.1 MG/DL (ref 8.6–10.5)
CALCIUM SPEC-SCNC: 9.1 MG/DL (ref 8.6–10.5)
CALCIUM SPEC-SCNC: 9.2 MG/DL (ref 8.6–10.5)
CALCIUM SPEC-SCNC: 9.3 MG/DL (ref 8.6–10.5)
CALCIUM SPEC-SCNC: 9.3 MG/DL (ref 8.6–10.5)
CALCIUM SPEC-SCNC: 9.4 MG/DL (ref 8.6–10.5)
CALCIUM SPEC-SCNC: 9.4 MG/DL (ref 8.6–10.5)
CALCIUM SPEC-SCNC: 9.5 MG/DL (ref 8.6–10.5)
CALCIUM SPEC-SCNC: 9.6 MG/DL (ref 8.6–10.5)
CHLORIDE SERPL-SCNC: 100 MMOL/L (ref 98–107)
CHLORIDE SERPL-SCNC: 101 MMOL/L (ref 98–107)
CHLORIDE SERPL-SCNC: 102 MMOL/L (ref 98–107)
CHLORIDE SERPL-SCNC: 102 MMOL/L (ref 98–107)
CHLORIDE SERPL-SCNC: 103 MMOL/L (ref 98–107)
CHLORIDE SERPL-SCNC: 104 MMOL/L (ref 98–107)
CHLORIDE SERPL-SCNC: 105 MMOL/L (ref 98–107)
CHLORIDE SERPL-SCNC: 106 MMOL/L (ref 98–107)
CHLORIDE SERPL-SCNC: 107 MMOL/L (ref 98–107)
CHLORIDE SERPL-SCNC: 108 MMOL/L (ref 98–107)
CHLORIDE SERPL-SCNC: 109 MMOL/L (ref 98–107)
CHLORIDE SERPL-SCNC: 109 MMOL/L (ref 98–107)
CHLORIDE SERPL-SCNC: 110 MMOL/L (ref 98–107)
CHLORIDE SERPL-SCNC: 110 MMOL/L (ref 98–107)
CHLORIDE SERPL-SCNC: 111 MMOL/L (ref 98–107)
CHLORIDE SERPL-SCNC: 113 MMOL/L (ref 98–107)
CHLORIDE SERPL-SCNC: 113 MMOL/L (ref 98–107)
CHLORIDE SERPL-SCNC: 116 MMOL/L (ref 98–107)
CHLORIDE SERPL-SCNC: 116 MMOL/L (ref 98–107)
CHLORIDE SERPL-SCNC: 97 MMOL/L (ref 98–107)
CHLORIDE SERPL-SCNC: 97 MMOL/L (ref 98–107)
CHLORIDE SERPL-SCNC: 99 MMOL/L (ref 98–107)
CHOLEST SERPL-MCNC: 115 MG/DL (ref 0–200)
CHOLEST SERPL-MCNC: 117 MG/DL (ref 0–200)
CHOLEST SERPL-MCNC: 125 MG/DL (ref 0–200)
CHOLEST SERPL-MCNC: 55 MG/DL (ref 0–200)
CHOLEST SERPL-MCNC: 90 MG/DL (ref 0–200)
CK SERPL-CCNC: 119 U/L (ref 20–180)
CK SERPL-CCNC: 14 U/L (ref 20–180)
CK SERPL-CCNC: 15 U/L (ref 20–180)
CK SERPL-CCNC: 150 U/L (ref 20–180)
CK SERPL-CCNC: 178 U/L (ref 20–180)
CK SERPL-CCNC: 21 U/L (ref 20–180)
CK SERPL-CCNC: 22 U/L (ref 20–180)
CK SERPL-CCNC: 24 U/L (ref 20–180)
CK SERPL-CCNC: 26 U/L (ref 20–180)
CK SERPL-CCNC: 26 U/L (ref 20–180)
CK SERPL-CCNC: 27 U/L (ref 20–180)
CK SERPL-CCNC: 30 U/L (ref 20–180)
CK SERPL-CCNC: 33 U/L (ref 20–180)
CK SERPL-CCNC: 34 U/L (ref 20–180)
CK SERPL-CCNC: 36 U/L (ref 20–180)
CK SERPL-CCNC: 36 U/L (ref 20–180)
CK SERPL-CCNC: 37 U/L (ref 20–180)
CK SERPL-CCNC: 38 U/L (ref 20–180)
CK SERPL-CCNC: 38 U/L (ref 20–180)
CK SERPL-CCNC: 41 U/L (ref 20–180)
CK SERPL-CCNC: 45 U/L (ref 20–180)
CK SERPL-CCNC: 59 U/L (ref 20–180)
CK SERPL-CCNC: 60 U/L (ref 20–180)
CK SERPL-CCNC: 61 U/L (ref 20–180)
CK SERPL-CCNC: 62 U/L (ref 20–180)
CK SERPL-CCNC: 90 U/L (ref 20–180)
CLARITY UR: ABNORMAL
CLARITY UR: CLEAR
CO2 BLDA-SCNC: 19.3 MMOL/L (ref 22–33)
CO2 BLDA-SCNC: 20.1 MMOL/L (ref 22–33)
CO2 BLDA-SCNC: 20.9 MMOL/L (ref 22–33)
CO2 BLDA-SCNC: 23 MMOL/L (ref 22–33)
CO2 BLDA-SCNC: 23.8 MMOL/L (ref 22–33)
CO2 BLDA-SCNC: 23.8 MMOL/L (ref 22–33)
CO2 BLDA-SCNC: 25.9 MMOL/L (ref 22–33)
CO2 BLDA-SCNC: 26.4 MMOL/L (ref 22–33)
CO2 BLDA-SCNC: 27 MMOL/L (ref 22–33)
CO2 BLDA-SCNC: 27.2 MMOL/L (ref 22–33)
CO2 BLDA-SCNC: 27.2 MMOL/L (ref 22–33)
CO2 BLDA-SCNC: 27.8 MMOL/L (ref 22–33)
CO2 BLDA-SCNC: 29.1 MMOL/L (ref 22–33)
CO2 BLDA-SCNC: 30.5 MMOL/L (ref 22–33)
CO2 BLDA-SCNC: 32.4 MMOL/L (ref 22–33)
CO2 SERPL-SCNC: 16 MMOL/L (ref 22–29)
CO2 SERPL-SCNC: 17 MMOL/L (ref 22–29)
CO2 SERPL-SCNC: 18 MMOL/L (ref 22–29)
CO2 SERPL-SCNC: 18 MMOL/L (ref 22–29)
CO2 SERPL-SCNC: 19 MMOL/L (ref 22–29)
CO2 SERPL-SCNC: 19 MMOL/L (ref 22–29)
CO2 SERPL-SCNC: 20 MMOL/L (ref 22–29)
CO2 SERPL-SCNC: 21 MMOL/L (ref 22–29)
CO2 SERPL-SCNC: 22 MMOL/L (ref 22–29)
CO2 SERPL-SCNC: 23 MMOL/L (ref 22–29)
CO2 SERPL-SCNC: 24 MMOL/L (ref 22–29)
CO2 SERPL-SCNC: 24 MMOL/L (ref 22–29)
CO2 SERPL-SCNC: 25 MMOL/L (ref 22–29)
CO2 SERPL-SCNC: 26 MMOL/L (ref 22–29)
CO2 SERPL-SCNC: 27 MMOL/L (ref 22–29)
CO2 SERPL-SCNC: 28 MMOL/L (ref 22–29)
CO2 SERPL-SCNC: 32 MMOL/L (ref 22–29)
COARSE GRAN CASTS URNS QL MICRO: ABNORMAL /LPF
COHGB MFR BLD: 0.5 % (ref 0–2)
COHGB MFR BLD: 0.5 % (ref 0–2)
COHGB MFR BLD: 0.6 % (ref 0–2)
COHGB MFR BLD: 0.6 % (ref 0–2)
COHGB MFR BLD: 0.7 % (ref 0–2)
COHGB MFR BLD: 0.7 % (ref 0–2)
COHGB MFR BLD: 0.8 % (ref 0–2)
COHGB MFR BLD: 0.9 % (ref 0–2)
COHGB MFR BLD: 1 % (ref 0–2)
COLOR UR: YELLOW
CORTIS SERPL-MCNC: 24.84 MCG/DL
CREAT SERPL-MCNC: 0.49 MG/DL (ref 0.57–1)
CREAT SERPL-MCNC: 0.54 MG/DL (ref 0.57–1)
CREAT SERPL-MCNC: 0.56 MG/DL (ref 0.57–1)
CREAT SERPL-MCNC: 0.56 MG/DL (ref 0.57–1)
CREAT SERPL-MCNC: 0.58 MG/DL (ref 0.57–1)
CREAT SERPL-MCNC: 0.59 MG/DL (ref 0.57–1)
CREAT SERPL-MCNC: 0.6 MG/DL (ref 0.57–1)
CREAT SERPL-MCNC: 0.62 MG/DL (ref 0.57–1)
CREAT SERPL-MCNC: 0.63 MG/DL (ref 0.57–1)
CREAT SERPL-MCNC: 0.63 MG/DL (ref 0.57–1)
CREAT SERPL-MCNC: 0.67 MG/DL (ref 0.57–1)
CREAT SERPL-MCNC: 0.67 MG/DL (ref 0.57–1)
CREAT SERPL-MCNC: 0.69 MG/DL (ref 0.57–1)
CREAT SERPL-MCNC: 0.75 MG/DL (ref 0.57–1)
CREAT SERPL-MCNC: 0.8 MG/DL (ref 0.57–1)
CREAT SERPL-MCNC: 0.84 MG/DL (ref 0.57–1)
CREAT SERPL-MCNC: 0.91 MG/DL (ref 0.57–1)
CREAT SERPL-MCNC: 0.92 MG/DL (ref 0.57–1)
CREAT SERPL-MCNC: 0.94 MG/DL (ref 0.57–1)
CREAT SERPL-MCNC: 0.96 MG/DL (ref 0.57–1)
CREAT SERPL-MCNC: 1 MG/DL (ref 0.57–1)
CREAT SERPL-MCNC: 1.02 MG/DL (ref 0.57–1)
CREAT SERPL-MCNC: 1.05 MG/DL (ref 0.57–1)
CREAT SERPL-MCNC: 1.08 MG/DL (ref 0.57–1)
CREAT SERPL-MCNC: 1.09 MG/DL (ref 0.57–1)
CREAT SERPL-MCNC: 1.19 MG/DL (ref 0.57–1)
CREAT SERPL-MCNC: 1.23 MG/DL (ref 0.57–1)
CREAT SERPL-MCNC: 1.32 MG/DL (ref 0.57–1)
CREAT SERPL-MCNC: 1.32 MG/DL (ref 0.57–1)
CREAT SERPL-MCNC: 1.41 MG/DL (ref 0.57–1)
CREAT SERPL-MCNC: 1.42 MG/DL (ref 0.57–1)
CREAT SERPL-MCNC: 1.53 MG/DL (ref 0.57–1)
CREAT SERPL-MCNC: 1.79 MG/DL (ref 0.57–1)
CREAT SERPL-MCNC: 1.88 MG/DL (ref 0.57–1)
CREAT SERPL-MCNC: 1.89 MG/DL (ref 0.57–1)
CREAT SERPL-MCNC: 2.19 MG/DL (ref 0.57–1)
CREAT SERPL-MCNC: 2.22 MG/DL (ref 0.57–1)
CREAT UR-MCNC: 144.7 MG/DL
CROSSMATCH INTERPRETATION: NORMAL
CRP SERPL-MCNC: 1.05 MG/DL (ref 0–0.5)
CRP SERPL-MCNC: 1.11 MG/DL (ref 0–0.5)
CRP SERPL-MCNC: 1.13 MG/DL (ref 0–0.5)
CRP SERPL-MCNC: 1.75 MG/DL (ref 0–0.5)
CRP SERPL-MCNC: 11.81 MG/DL (ref 0–0.5)
CRP SERPL-MCNC: 14.45 MG/DL (ref 0–0.5)
CRP SERPL-MCNC: 17.02 MG/DL (ref 0–0.5)
CRP SERPL-MCNC: 18.92 MG/DL (ref 0–0.5)
CRP SERPL-MCNC: 19.07 MG/DL (ref 0–0.5)
CRP SERPL-MCNC: 19.31 MG/DL (ref 0–0.5)
CRP SERPL-MCNC: 24.91 MG/DL (ref 0–0.5)
CRP SERPL-MCNC: 26.35 MG/DL (ref 0–0.5)
CRP SERPL-MCNC: 29.15 MG/DL (ref 0–0.5)
CRP SERPL-MCNC: 29.99 MG/DL (ref 0–0.5)
CRP SERPL-MCNC: 3.22 MG/DL (ref 0–0.5)
CRP SERPL-MCNC: 3.9 MG/DL (ref 0–0.5)
CRP SERPL-MCNC: 31.65 MG/DL (ref 0–0.5)
CRP SERPL-MCNC: 31.75 MG/DL (ref 0–0.5)
CRP SERPL-MCNC: 33.03 MG/DL (ref 0–0.5)
CRP SERPL-MCNC: 33.68 MG/DL (ref 0–0.5)
CRP SERPL-MCNC: 36.64 MG/DL (ref 0–0.5)
CRP SERPL-MCNC: 37.55 MG/DL (ref 0–0.5)
CRP SERPL-MCNC: 4.07 MG/DL (ref 0–0.5)
CRP SERPL-MCNC: 4.49 MG/DL (ref 0–0.5)
CRP SERPL-MCNC: 4.69 MG/DL (ref 0–0.5)
CRP SERPL-MCNC: 5.57 MG/DL (ref 0–0.5)
CRP SERPL-MCNC: 6.1 MG/DL (ref 0–0.5)
CRP SERPL-MCNC: 6.35 MG/DL (ref 0–0.5)
CRP SERPL-MCNC: 8.05 MG/DL (ref 0–0.5)
CRP SERPL-MCNC: 8.19 MG/DL (ref 0–0.5)
CRP SERPL-MCNC: 8.8 MG/DL (ref 0–0.5)
D DIMER PPP FEU-MCNC: 0.51 MCGFEU/ML (ref 0–0.56)
D DIMER PPP FEU-MCNC: 0.57 MCGFEU/ML (ref 0–0.56)
D DIMER PPP FEU-MCNC: 0.58 MCGFEU/ML (ref 0–0.56)
D DIMER PPP FEU-MCNC: 0.61 MCGFEU/ML (ref 0–0.56)
D DIMER PPP FEU-MCNC: 0.72 MCGFEU/ML (ref 0–0.56)
D DIMER PPP FEU-MCNC: 0.74 MCGFEU/ML (ref 0–0.56)
D DIMER PPP FEU-MCNC: 1.25 MCGFEU/ML (ref 0–0.56)
D DIMER PPP FEU-MCNC: 1.52 MCGFEU/ML (ref 0–0.56)
D DIMER PPP FEU-MCNC: 1.52 MCGFEU/ML (ref 0–0.56)
D DIMER PPP FEU-MCNC: 1.84 MCGFEU/ML (ref 0–0.56)
D DIMER PPP FEU-MCNC: 2.41 MCGFEU/ML (ref 0–0.56)
D DIMER PPP FEU-MCNC: 2.5 MCGFEU/ML (ref 0–0.56)
D DIMER PPP FEU-MCNC: 2.52 MCGFEU/ML (ref 0–0.56)
D DIMER PPP FEU-MCNC: 2.54 MCGFEU/ML (ref 0–0.56)
D DIMER PPP FEU-MCNC: 3.25 MCGFEU/ML (ref 0–0.56)
D DIMER PPP FEU-MCNC: 4.86 MCGFEU/ML (ref 0–0.56)
D-LACTATE SERPL-SCNC: 1.2 MMOL/L (ref 0.5–2)
D-LACTATE SERPL-SCNC: 1.2 MMOL/L (ref 0.5–2)
D-LACTATE SERPL-SCNC: 1.7 MMOL/L (ref 0.5–2)
D-LACTATE SERPL-SCNC: 1.8 MMOL/L (ref 0.5–2)
D-LACTATE SERPL-SCNC: 1.8 MMOL/L (ref 0.5–2)
DEPRECATED RDW RBC AUTO: 45.2 FL (ref 37–54)
DEPRECATED RDW RBC AUTO: 46 FL (ref 37–54)
DEPRECATED RDW RBC AUTO: 46.4 FL (ref 37–54)
DEPRECATED RDW RBC AUTO: 46.4 FL (ref 37–54)
DEPRECATED RDW RBC AUTO: 46.5 FL (ref 37–54)
DEPRECATED RDW RBC AUTO: 48.4 FL (ref 37–54)
DEPRECATED RDW RBC AUTO: 48.8 FL (ref 37–54)
DEPRECATED RDW RBC AUTO: 49.3 FL (ref 37–54)
DEPRECATED RDW RBC AUTO: 49.5 FL (ref 37–54)
DEPRECATED RDW RBC AUTO: 49.7 FL (ref 37–54)
DEPRECATED RDW RBC AUTO: 49.8 FL (ref 37–54)
DEPRECATED RDW RBC AUTO: 50.3 FL (ref 37–54)
DEPRECATED RDW RBC AUTO: 50.4 FL (ref 37–54)
DEPRECATED RDW RBC AUTO: 50.5 FL (ref 37–54)
DEPRECATED RDW RBC AUTO: 50.9 FL (ref 37–54)
DEPRECATED RDW RBC AUTO: 51.2 FL (ref 37–54)
DEPRECATED RDW RBC AUTO: 51.8 FL (ref 37–54)
DEPRECATED RDW RBC AUTO: 51.9 FL (ref 37–54)
DEPRECATED RDW RBC AUTO: 51.9 FL (ref 37–54)
DEPRECATED RDW RBC AUTO: 52 FL (ref 37–54)
DEPRECATED RDW RBC AUTO: 52.1 FL (ref 37–54)
DEPRECATED RDW RBC AUTO: 52.2 FL (ref 37–54)
DEPRECATED RDW RBC AUTO: 52.6 FL (ref 37–54)
DEPRECATED RDW RBC AUTO: 53.2 FL (ref 37–54)
DEPRECATED RDW RBC AUTO: 53.5 FL (ref 37–54)
DEPRECATED RDW RBC AUTO: 54.8 FL (ref 37–54)
DEPRECATED RDW RBC AUTO: 55.3 FL (ref 37–54)
DEPRECATED RDW RBC AUTO: 55.4 FL (ref 37–54)
DEPRECATED RDW RBC AUTO: 55.8 FL (ref 37–54)
DEPRECATED RDW RBC AUTO: 56.9 FL (ref 37–54)
DEPRECATED RDW RBC AUTO: 57.3 FL (ref 37–54)
DEPRECATED RDW RBC AUTO: 58.8 FL (ref 37–54)
DEPRECATED RDW RBC AUTO: 60.5 FL (ref 37–54)
DEVELOPER EXPIRATION DATE: ABNORMAL
DEVELOPER LOT NUMBER: ABNORMAL
DOHLE BODIES: PRESENT
EOSINOPHIL # BLD AUTO: 0 10*3/MM3 (ref 0–0.4)
EOSINOPHIL # BLD AUTO: 0.01 10*3/MM3 (ref 0–0.4)
EOSINOPHIL # BLD AUTO: 0.01 10*3/MM3 (ref 0–0.4)
EOSINOPHIL # BLD AUTO: 0.03 10*3/MM3 (ref 0–0.4)
EOSINOPHIL # BLD AUTO: 0.06 10*3/MM3 (ref 0–0.4)
EOSINOPHIL # BLD AUTO: 0.07 10*3/MM3 (ref 0–0.4)
EOSINOPHIL # BLD AUTO: 0.07 10*3/MM3 (ref 0–0.4)
EOSINOPHIL # BLD AUTO: 0.08 10*3/MM3 (ref 0–0.4)
EOSINOPHIL # BLD AUTO: 0.09 10*3/MM3 (ref 0–0.4)
EOSINOPHIL # BLD AUTO: 0.1 10*3/MM3 (ref 0–0.4)
EOSINOPHIL # BLD AUTO: 0.15 10*3/MM3 (ref 0–0.4)
EOSINOPHIL # BLD AUTO: 0.16 10*3/MM3 (ref 0–0.4)
EOSINOPHIL # BLD AUTO: 0.19 10*3/MM3 (ref 0–0.4)
EOSINOPHIL # BLD AUTO: 0.24 10*3/MM3 (ref 0–0.4)
EOSINOPHIL # BLD AUTO: 0.31 10*3/MM3 (ref 0–0.4)
EOSINOPHIL # BLD AUTO: 0.33 10*3/MM3 (ref 0–0.4)
EOSINOPHIL # BLD AUTO: 0.36 10*3/MM3 (ref 0–0.4)
EOSINOPHIL # BLD AUTO: 0.39 10*3/MM3 (ref 0–0.4)
EOSINOPHIL # BLD AUTO: 0.41 10*3/MM3 (ref 0–0.4)
EOSINOPHIL # BLD AUTO: 0.42 10*3/MM3 (ref 0–0.4)
EOSINOPHIL # BLD AUTO: 0.44 10*3/MM3 (ref 0–0.4)
EOSINOPHIL # BLD AUTO: 0.6 10*3/MM3 (ref 0–0.4)
EOSINOPHIL # BLD MANUAL: 0 10*3/MM3 (ref 0–0.4)
EOSINOPHIL # BLD MANUAL: 0.74 10*3/MM3 (ref 0–0.4)
EOSINOPHIL NFR BLD AUTO: 0 % (ref 0.3–6.2)
EOSINOPHIL NFR BLD AUTO: 0.1 % (ref 0.3–6.2)
EOSINOPHIL NFR BLD AUTO: 0.3 % (ref 0.3–6.2)
EOSINOPHIL NFR BLD AUTO: 0.4 % (ref 0.3–6.2)
EOSINOPHIL NFR BLD AUTO: 0.5 % (ref 0.3–6.2)
EOSINOPHIL NFR BLD AUTO: 0.6 % (ref 0.3–6.2)
EOSINOPHIL NFR BLD AUTO: 0.6 % (ref 0.3–6.2)
EOSINOPHIL NFR BLD AUTO: 0.8 % (ref 0.3–6.2)
EOSINOPHIL NFR BLD AUTO: 1.2 % (ref 0.3–6.2)
EOSINOPHIL NFR BLD AUTO: 1.7 % (ref 0.3–6.2)
EOSINOPHIL NFR BLD AUTO: 1.9 % (ref 0.3–6.2)
EOSINOPHIL NFR BLD AUTO: 2.1 % (ref 0.3–6.2)
EOSINOPHIL NFR BLD AUTO: 2.1 % (ref 0.3–6.2)
EOSINOPHIL NFR BLD AUTO: 2.9 % (ref 0.3–6.2)
EOSINOPHIL NFR BLD AUTO: 3 % (ref 0.3–6.2)
EOSINOPHIL NFR BLD AUTO: 4 % (ref 0.3–6.2)
EOSINOPHIL NFR BLD AUTO: 4.1 % (ref 0.3–6.2)
EOSINOPHIL NFR BLD AUTO: 4.4 % (ref 0.3–6.2)
EOSINOPHIL NFR BLD AUTO: 4.5 % (ref 0.3–6.2)
EOSINOPHIL NFR BLD AUTO: 5 % (ref 0.3–6.2)
EOSINOPHIL NFR BLD AUTO: 5.3 % (ref 0.3–6.2)
EOSINOPHIL NFR BLD AUTO: 5.4 % (ref 0.3–6.2)
EOSINOPHIL NFR BLD MANUAL: 0 % (ref 0.3–6.2)
EOSINOPHIL NFR BLD MANUAL: 4 % (ref 0.3–6.2)
EOSINOPHIL SPEC QL MICRO: 0 % EOS/100 CELLS (ref 0–0)
EPAP: 0
ERYTHROCYTE [DISTWIDTH] IN BLOOD BY AUTOMATED COUNT: 13.5 % (ref 12.3–15.4)
ERYTHROCYTE [DISTWIDTH] IN BLOOD BY AUTOMATED COUNT: 13.9 % (ref 12.3–15.4)
ERYTHROCYTE [DISTWIDTH] IN BLOOD BY AUTOMATED COUNT: 13.9 % (ref 12.3–15.4)
ERYTHROCYTE [DISTWIDTH] IN BLOOD BY AUTOMATED COUNT: 14 % (ref 12.3–15.4)
ERYTHROCYTE [DISTWIDTH] IN BLOOD BY AUTOMATED COUNT: 14.2 % (ref 12.3–15.4)
ERYTHROCYTE [DISTWIDTH] IN BLOOD BY AUTOMATED COUNT: 14.2 % (ref 12.3–15.4)
ERYTHROCYTE [DISTWIDTH] IN BLOOD BY AUTOMATED COUNT: 14.5 % (ref 12.3–15.4)
ERYTHROCYTE [DISTWIDTH] IN BLOOD BY AUTOMATED COUNT: 14.8 % (ref 12.3–15.4)
ERYTHROCYTE [DISTWIDTH] IN BLOOD BY AUTOMATED COUNT: 15 % (ref 12.3–15.4)
ERYTHROCYTE [DISTWIDTH] IN BLOOD BY AUTOMATED COUNT: 15.4 % (ref 12.3–15.4)
ERYTHROCYTE [DISTWIDTH] IN BLOOD BY AUTOMATED COUNT: 15.5 % (ref 12.3–15.4)
ERYTHROCYTE [DISTWIDTH] IN BLOOD BY AUTOMATED COUNT: 15.5 % (ref 12.3–15.4)
ERYTHROCYTE [DISTWIDTH] IN BLOOD BY AUTOMATED COUNT: 15.6 % (ref 12.3–15.4)
ERYTHROCYTE [DISTWIDTH] IN BLOOD BY AUTOMATED COUNT: 15.7 % (ref 12.3–15.4)
ERYTHROCYTE [DISTWIDTH] IN BLOOD BY AUTOMATED COUNT: 15.8 % (ref 12.3–15.4)
ERYTHROCYTE [DISTWIDTH] IN BLOOD BY AUTOMATED COUNT: 15.9 % (ref 12.3–15.4)
ERYTHROCYTE [DISTWIDTH] IN BLOOD BY AUTOMATED COUNT: 16.2 % (ref 12.3–15.4)
ERYTHROCYTE [DISTWIDTH] IN BLOOD BY AUTOMATED COUNT: 16.3 % (ref 12.3–15.4)
ERYTHROCYTE [DISTWIDTH] IN BLOOD BY AUTOMATED COUNT: 16.4 % (ref 12.3–15.4)
ERYTHROCYTE [DISTWIDTH] IN BLOOD BY AUTOMATED COUNT: 16.5 % (ref 12.3–15.4)
ERYTHROCYTE [DISTWIDTH] IN BLOOD BY AUTOMATED COUNT: 16.5 % (ref 12.3–15.4)
ERYTHROCYTE [DISTWIDTH] IN BLOOD BY AUTOMATED COUNT: 16.6 % (ref 12.3–15.4)
ERYTHROCYTE [DISTWIDTH] IN BLOOD BY AUTOMATED COUNT: 16.7 % (ref 12.3–15.4)
EXPIRATION DATE: ABNORMAL
FECAL OCCULT BLOOD SCREEN, POC: POSITIVE
FERRITIN SERPL-MCNC: 143.7 NG/ML (ref 13–150)
FERRITIN SERPL-MCNC: 178.5 NG/ML (ref 13–150)
FERRITIN SERPL-MCNC: 183.5 NG/ML (ref 13–150)
FERRITIN SERPL-MCNC: 186.4 NG/ML (ref 13–150)
FERRITIN SERPL-MCNC: 217.7 NG/ML (ref 13–150)
FERRITIN SERPL-MCNC: 229.7 NG/ML (ref 13–150)
FERRITIN SERPL-MCNC: 246.6 NG/ML (ref 13–150)
FERRITIN SERPL-MCNC: 248.9 NG/ML (ref 13–150)
FERRITIN SERPL-MCNC: 256.1 NG/ML (ref 13–150)
FERRITIN SERPL-MCNC: 256.7 NG/ML (ref 13–150)
FERRITIN SERPL-MCNC: 257.3 NG/ML (ref 13–150)
FERRITIN SERPL-MCNC: 274.9 NG/ML (ref 13–150)
FERRITIN SERPL-MCNC: 291.8 NG/ML (ref 13–150)
FERRITIN SERPL-MCNC: 306.2 NG/ML (ref 13–150)
FERRITIN SERPL-MCNC: 309.5 NG/ML (ref 13–150)
FERRITIN SERPL-MCNC: 314.6 NG/ML (ref 13–150)
FERRITIN SERPL-MCNC: 323.1 NG/ML (ref 13–150)
FERRITIN SERPL-MCNC: 323.5 NG/ML (ref 13–150)
FERRITIN SERPL-MCNC: 334.3 NG/ML (ref 13–150)
FERRITIN SERPL-MCNC: 334.5 NG/ML (ref 13–150)
FERRITIN SERPL-MCNC: 336.4 NG/ML (ref 13–150)
FERRITIN SERPL-MCNC: 339.2 NG/ML (ref 13–150)
FERRITIN SERPL-MCNC: 374.5 NG/ML (ref 13–150)
FERRITIN SERPL-MCNC: 386.3 NG/ML (ref 13–150)
FERRITIN SERPL-MCNC: 391.7 NG/ML (ref 13–150)
FERRITIN SERPL-MCNC: 436.2 NG/ML (ref 13–150)
FERRITIN SERPL-MCNC: 448.1 NG/ML (ref 13–150)
FERRITIN SERPL-MCNC: 454.6 NG/ML (ref 13–150)
FERRITIN SERPL-MCNC: 456.8 NG/ML (ref 13–150)
FERRITIN SERPL-MCNC: 475.9 NG/ML (ref 13–150)
FERRITIN SERPL-MCNC: 507.9 NG/ML (ref 13–150)
FIBRINOGEN PPP-MCNC: 287 MG/DL (ref 215–430)
FIBRINOGEN PPP-MCNC: 316 MG/DL (ref 215–430)
FIBRINOGEN PPP-MCNC: 322 MG/DL (ref 215–430)
FIBRINOGEN PPP-MCNC: 340 MG/DL (ref 215–430)
FIBRINOGEN PPP-MCNC: 382 MG/DL (ref 215–430)
FIBRINOGEN PPP-MCNC: 399 MG/DL (ref 215–430)
FIBRINOGEN PPP-MCNC: 406 MG/DL (ref 215–430)
FIBRINOGEN PPP-MCNC: 535 MG/DL (ref 215–430)
FIBRINOGEN PPP-MCNC: 554 MG/DL (ref 215–430)
FIBRINOGEN PPP-MCNC: 606 MG/DL (ref 215–430)
FIBRINOGEN PPP-MCNC: 621 MG/DL (ref 215–430)
FIBRINOGEN PPP-MCNC: 688 MG/DL (ref 215–430)
FIBRINOGEN PPP-MCNC: 709 MG/DL (ref 215–430)
FIBRINOGEN PPP-MCNC: 713 MG/DL (ref 215–430)
FIBRINOGEN PPP-MCNC: 723 MG/DL (ref 215–430)
FLUAV H1 2009 PAND RNA NPH QL NAA+PROBE: NOT DETECTED
FLUAV H1 HA GENE NPH QL NAA+PROBE: NOT DETECTED
FLUAV H3 RNA NPH QL NAA+PROBE: NOT DETECTED
FLUAV SUBTYP SPEC NAA+PROBE: NOT DETECTED
FLUBV RNA ISLT QL NAA+PROBE: NOT DETECTED
FOLATE SERPL-MCNC: 12.7 NG/ML (ref 4.78–24.2)
GALACTOMANNAN AG SPEC IA-ACNC: 0.03 INDEX (ref 0–0.49)
GFR SERPL CREATININE-BSD FRML MDRD: 101 ML/MIN/1.73
GFR SERPL CREATININE-BSD FRML MDRD: 105 ML/MIN/1.73
GFR SERPL CREATININE-BSD FRML MDRD: 105 ML/MIN/1.73
GFR SERPL CREATININE-BSD FRML MDRD: 109 ML/MIN/1.73
GFR SERPL CREATININE-BSD FRML MDRD: 122 ML/MIN/1.73
GFR SERPL CREATININE-BSD FRML MDRD: 21 ML/MIN/1.73
GFR SERPL CREATININE-BSD FRML MDRD: 22 ML/MIN/1.73
GFR SERPL CREATININE-BSD FRML MDRD: 26 ML/MIN/1.73
GFR SERPL CREATININE-BSD FRML MDRD: 28 ML/MIN/1.73
GFR SERPL CREATININE-BSD FRML MDRD: 33 ML/MIN/1.73
GFR SERPL CREATININE-BSD FRML MDRD: 36 ML/MIN/1.73
GFR SERPL CREATININE-BSD FRML MDRD: 36 ML/MIN/1.73
GFR SERPL CREATININE-BSD FRML MDRD: 39 ML/MIN/1.73
GFR SERPL CREATININE-BSD FRML MDRD: 39 ML/MIN/1.73
GFR SERPL CREATININE-BSD FRML MDRD: 42 ML/MIN/1.73
GFR SERPL CREATININE-BSD FRML MDRD: 44 ML/MIN/1.73
GFR SERPL CREATININE-BSD FRML MDRD: 49 ML/MIN/1.73
GFR SERPL CREATININE-BSD FRML MDRD: 49 ML/MIN/1.73
GFR SERPL CREATININE-BSD FRML MDRD: 51 ML/MIN/1.73
GFR SERPL CREATININE-BSD FRML MDRD: 53 ML/MIN/1.73
GFR SERPL CREATININE-BSD FRML MDRD: 54 ML/MIN/1.73
GFR SERPL CREATININE-BSD FRML MDRD: 57 ML/MIN/1.73
GFR SERPL CREATININE-BSD FRML MDRD: 59 ML/MIN/1.73
GFR SERPL CREATININE-BSD FRML MDRD: 60 ML/MIN/1.73
GFR SERPL CREATININE-BSD FRML MDRD: 66 ML/MIN/1.73
GFR SERPL CREATININE-BSD FRML MDRD: 70 ML/MIN/1.73
GFR SERPL CREATININE-BSD FRML MDRD: 75 ML/MIN/1.73
GFR SERPL CREATININE-BSD FRML MDRD: 82 ML/MIN/1.73
GFR SERPL CREATININE-BSD FRML MDRD: 85 ML/MIN/1.73
GFR SERPL CREATININE-BSD FRML MDRD: 86 ML/MIN/1.73
GFR SERPL CREATININE-BSD FRML MDRD: 92 ML/MIN/1.73
GFR SERPL CREATININE-BSD FRML MDRD: 92 ML/MIN/1.73
GFR SERPL CREATININE-BSD FRML MDRD: 97 ML/MIN/1.73
GFR SERPL CREATININE-BSD FRML MDRD: 99 ML/MIN/1.73
GLOBULIN UR ELPH-MCNC: 1.9 GM/DL
GLOBULIN UR ELPH-MCNC: 1.9 GM/DL
GLOBULIN UR ELPH-MCNC: 2 GM/DL
GLOBULIN UR ELPH-MCNC: 2.1 GM/DL
GLOBULIN UR ELPH-MCNC: 2.2 GM/DL
GLOBULIN UR ELPH-MCNC: 2.2 GM/DL
GLOBULIN UR ELPH-MCNC: 2.3 GM/DL
GLOBULIN UR ELPH-MCNC: 2.3 GM/DL
GLOBULIN UR ELPH-MCNC: 2.4 GM/DL
GLOBULIN UR ELPH-MCNC: 2.5 GM/DL
GLOBULIN UR ELPH-MCNC: 2.5 GM/DL
GLOBULIN UR ELPH-MCNC: 2.6 GM/DL
GLOBULIN UR ELPH-MCNC: 2.7 GM/DL
GLOBULIN UR ELPH-MCNC: 2.7 GM/DL
GLOBULIN UR ELPH-MCNC: 2.8 GM/DL
GLOBULIN UR ELPH-MCNC: 2.9 GM/DL
GLOBULIN UR ELPH-MCNC: 2.9 GM/DL
GLOBULIN UR ELPH-MCNC: 3.1 GM/DL
GLOBULIN UR ELPH-MCNC: 3.2 GM/DL
GLOBULIN UR ELPH-MCNC: 3.3 GM/DL
GLOBULIN UR ELPH-MCNC: 3.4 GM/DL
GLOBULIN UR ELPH-MCNC: 3.5 GM/DL
GLOBULIN UR ELPH-MCNC: 3.8 GM/DL
GLOBULIN UR ELPH-MCNC: 4 GM/DL
GLUCOSE BLDC GLUCOMTR-MCNC: 103 MG/DL (ref 70–130)
GLUCOSE BLDC GLUCOMTR-MCNC: 103 MG/DL (ref 70–130)
GLUCOSE BLDC GLUCOMTR-MCNC: 108 MG/DL (ref 70–130)
GLUCOSE BLDC GLUCOMTR-MCNC: 109 MG/DL (ref 70–130)
GLUCOSE BLDC GLUCOMTR-MCNC: 109 MG/DL (ref 70–130)
GLUCOSE BLDC GLUCOMTR-MCNC: 110 MG/DL (ref 70–130)
GLUCOSE BLDC GLUCOMTR-MCNC: 111 MG/DL (ref 70–130)
GLUCOSE BLDC GLUCOMTR-MCNC: 112 MG/DL (ref 70–130)
GLUCOSE BLDC GLUCOMTR-MCNC: 113 MG/DL (ref 70–130)
GLUCOSE BLDC GLUCOMTR-MCNC: 113 MG/DL (ref 70–130)
GLUCOSE BLDC GLUCOMTR-MCNC: 117 MG/DL (ref 70–130)
GLUCOSE BLDC GLUCOMTR-MCNC: 118 MG/DL (ref 70–130)
GLUCOSE BLDC GLUCOMTR-MCNC: 119 MG/DL (ref 70–130)
GLUCOSE BLDC GLUCOMTR-MCNC: 119 MG/DL (ref 70–130)
GLUCOSE BLDC GLUCOMTR-MCNC: 123 MG/DL (ref 70–130)
GLUCOSE BLDC GLUCOMTR-MCNC: 123 MG/DL (ref 70–130)
GLUCOSE BLDC GLUCOMTR-MCNC: 124 MG/DL (ref 70–130)
GLUCOSE BLDC GLUCOMTR-MCNC: 124 MG/DL (ref 70–130)
GLUCOSE BLDC GLUCOMTR-MCNC: 126 MG/DL (ref 70–130)
GLUCOSE BLDC GLUCOMTR-MCNC: 129 MG/DL (ref 70–130)
GLUCOSE BLDC GLUCOMTR-MCNC: 132 MG/DL (ref 70–130)
GLUCOSE BLDC GLUCOMTR-MCNC: 133 MG/DL (ref 70–130)
GLUCOSE BLDC GLUCOMTR-MCNC: 135 MG/DL (ref 70–130)
GLUCOSE BLDC GLUCOMTR-MCNC: 135 MG/DL (ref 70–130)
GLUCOSE BLDC GLUCOMTR-MCNC: 136 MG/DL (ref 70–130)
GLUCOSE BLDC GLUCOMTR-MCNC: 136 MG/DL (ref 70–130)
GLUCOSE BLDC GLUCOMTR-MCNC: 137 MG/DL (ref 70–130)
GLUCOSE BLDC GLUCOMTR-MCNC: 137 MG/DL (ref 70–130)
GLUCOSE BLDC GLUCOMTR-MCNC: 138 MG/DL (ref 70–130)
GLUCOSE BLDC GLUCOMTR-MCNC: 139 MG/DL (ref 70–130)
GLUCOSE BLDC GLUCOMTR-MCNC: 140 MG/DL (ref 70–130)
GLUCOSE BLDC GLUCOMTR-MCNC: 145 MG/DL (ref 70–130)
GLUCOSE BLDC GLUCOMTR-MCNC: 146 MG/DL (ref 70–130)
GLUCOSE BLDC GLUCOMTR-MCNC: 148 MG/DL (ref 70–130)
GLUCOSE BLDC GLUCOMTR-MCNC: 148 MG/DL (ref 70–130)
GLUCOSE BLDC GLUCOMTR-MCNC: 149 MG/DL (ref 70–130)
GLUCOSE BLDC GLUCOMTR-MCNC: 150 MG/DL (ref 70–130)
GLUCOSE BLDC GLUCOMTR-MCNC: 151 MG/DL (ref 70–130)
GLUCOSE BLDC GLUCOMTR-MCNC: 151 MG/DL (ref 70–130)
GLUCOSE BLDC GLUCOMTR-MCNC: 152 MG/DL (ref 70–130)
GLUCOSE BLDC GLUCOMTR-MCNC: 153 MG/DL (ref 70–130)
GLUCOSE BLDC GLUCOMTR-MCNC: 154 MG/DL (ref 70–130)
GLUCOSE BLDC GLUCOMTR-MCNC: 161 MG/DL (ref 70–130)
GLUCOSE BLDC GLUCOMTR-MCNC: 165 MG/DL (ref 70–130)
GLUCOSE BLDC GLUCOMTR-MCNC: 166 MG/DL (ref 70–130)
GLUCOSE BLDC GLUCOMTR-MCNC: 167 MG/DL (ref 70–130)
GLUCOSE BLDC GLUCOMTR-MCNC: 168 MG/DL (ref 70–130)
GLUCOSE BLDC GLUCOMTR-MCNC: 169 MG/DL (ref 70–130)
GLUCOSE BLDC GLUCOMTR-MCNC: 169 MG/DL (ref 70–130)
GLUCOSE BLDC GLUCOMTR-MCNC: 170 MG/DL (ref 70–130)
GLUCOSE BLDC GLUCOMTR-MCNC: 170 MG/DL (ref 70–130)
GLUCOSE BLDC GLUCOMTR-MCNC: 171 MG/DL (ref 70–130)
GLUCOSE BLDC GLUCOMTR-MCNC: 171 MG/DL (ref 70–130)
GLUCOSE BLDC GLUCOMTR-MCNC: 175 MG/DL (ref 70–130)
GLUCOSE BLDC GLUCOMTR-MCNC: 176 MG/DL (ref 70–130)
GLUCOSE BLDC GLUCOMTR-MCNC: 177 MG/DL (ref 70–130)
GLUCOSE BLDC GLUCOMTR-MCNC: 177 MG/DL (ref 70–130)
GLUCOSE BLDC GLUCOMTR-MCNC: 179 MG/DL (ref 70–130)
GLUCOSE BLDC GLUCOMTR-MCNC: 180 MG/DL (ref 70–130)
GLUCOSE BLDC GLUCOMTR-MCNC: 184 MG/DL (ref 70–130)
GLUCOSE BLDC GLUCOMTR-MCNC: 185 MG/DL (ref 70–130)
GLUCOSE BLDC GLUCOMTR-MCNC: 186 MG/DL (ref 70–130)
GLUCOSE BLDC GLUCOMTR-MCNC: 186 MG/DL (ref 70–130)
GLUCOSE BLDC GLUCOMTR-MCNC: 187 MG/DL (ref 70–130)
GLUCOSE BLDC GLUCOMTR-MCNC: 188 MG/DL (ref 70–130)
GLUCOSE BLDC GLUCOMTR-MCNC: 188 MG/DL (ref 70–130)
GLUCOSE BLDC GLUCOMTR-MCNC: 191 MG/DL (ref 70–130)
GLUCOSE BLDC GLUCOMTR-MCNC: 191 MG/DL (ref 70–130)
GLUCOSE BLDC GLUCOMTR-MCNC: 192 MG/DL (ref 70–130)
GLUCOSE BLDC GLUCOMTR-MCNC: 194 MG/DL (ref 70–130)
GLUCOSE BLDC GLUCOMTR-MCNC: 195 MG/DL (ref 70–130)
GLUCOSE BLDC GLUCOMTR-MCNC: 197 MG/DL (ref 70–130)
GLUCOSE BLDC GLUCOMTR-MCNC: 198 MG/DL (ref 70–130)
GLUCOSE BLDC GLUCOMTR-MCNC: 199 MG/DL (ref 70–130)
GLUCOSE BLDC GLUCOMTR-MCNC: 199 MG/DL (ref 70–130)
GLUCOSE BLDC GLUCOMTR-MCNC: 200 MG/DL (ref 70–130)
GLUCOSE BLDC GLUCOMTR-MCNC: 201 MG/DL (ref 70–130)
GLUCOSE BLDC GLUCOMTR-MCNC: 202 MG/DL (ref 70–130)
GLUCOSE BLDC GLUCOMTR-MCNC: 203 MG/DL (ref 70–130)
GLUCOSE BLDC GLUCOMTR-MCNC: 204 MG/DL (ref 70–130)
GLUCOSE BLDC GLUCOMTR-MCNC: 204 MG/DL (ref 70–130)
GLUCOSE BLDC GLUCOMTR-MCNC: 205 MG/DL (ref 70–130)
GLUCOSE BLDC GLUCOMTR-MCNC: 206 MG/DL (ref 70–130)
GLUCOSE BLDC GLUCOMTR-MCNC: 207 MG/DL (ref 70–130)
GLUCOSE BLDC GLUCOMTR-MCNC: 207 MG/DL (ref 70–130)
GLUCOSE BLDC GLUCOMTR-MCNC: 208 MG/DL (ref 70–130)
GLUCOSE BLDC GLUCOMTR-MCNC: 209 MG/DL (ref 70–130)
GLUCOSE BLDC GLUCOMTR-MCNC: 211 MG/DL (ref 70–130)
GLUCOSE BLDC GLUCOMTR-MCNC: 213 MG/DL (ref 70–130)
GLUCOSE BLDC GLUCOMTR-MCNC: 215 MG/DL (ref 70–130)
GLUCOSE BLDC GLUCOMTR-MCNC: 216 MG/DL (ref 70–130)
GLUCOSE BLDC GLUCOMTR-MCNC: 219 MG/DL (ref 70–130)
GLUCOSE BLDC GLUCOMTR-MCNC: 219 MG/DL (ref 70–130)
GLUCOSE BLDC GLUCOMTR-MCNC: 221 MG/DL (ref 70–130)
GLUCOSE BLDC GLUCOMTR-MCNC: 227 MG/DL (ref 70–130)
GLUCOSE BLDC GLUCOMTR-MCNC: 228 MG/DL (ref 70–130)
GLUCOSE BLDC GLUCOMTR-MCNC: 230 MG/DL (ref 70–130)
GLUCOSE BLDC GLUCOMTR-MCNC: 231 MG/DL (ref 70–130)
GLUCOSE BLDC GLUCOMTR-MCNC: 233 MG/DL (ref 70–130)
GLUCOSE BLDC GLUCOMTR-MCNC: 234 MG/DL (ref 70–130)
GLUCOSE BLDC GLUCOMTR-MCNC: 236 MG/DL (ref 70–130)
GLUCOSE BLDC GLUCOMTR-MCNC: 238 MG/DL (ref 70–130)
GLUCOSE BLDC GLUCOMTR-MCNC: 238 MG/DL (ref 70–130)
GLUCOSE BLDC GLUCOMTR-MCNC: 239 MG/DL (ref 70–130)
GLUCOSE BLDC GLUCOMTR-MCNC: 240 MG/DL (ref 70–130)
GLUCOSE BLDC GLUCOMTR-MCNC: 242 MG/DL (ref 70–130)
GLUCOSE BLDC GLUCOMTR-MCNC: 243 MG/DL (ref 70–130)
GLUCOSE BLDC GLUCOMTR-MCNC: 244 MG/DL (ref 70–130)
GLUCOSE BLDC GLUCOMTR-MCNC: 244 MG/DL (ref 70–130)
GLUCOSE BLDC GLUCOMTR-MCNC: 248 MG/DL (ref 70–130)
GLUCOSE BLDC GLUCOMTR-MCNC: 249 MG/DL (ref 70–130)
GLUCOSE BLDC GLUCOMTR-MCNC: 249 MG/DL (ref 70–130)
GLUCOSE BLDC GLUCOMTR-MCNC: 253 MG/DL (ref 70–130)
GLUCOSE BLDC GLUCOMTR-MCNC: 254 MG/DL (ref 70–130)
GLUCOSE BLDC GLUCOMTR-MCNC: 256 MG/DL (ref 70–130)
GLUCOSE BLDC GLUCOMTR-MCNC: 259 MG/DL (ref 70–130)
GLUCOSE BLDC GLUCOMTR-MCNC: 261 MG/DL (ref 70–130)
GLUCOSE BLDC GLUCOMTR-MCNC: 263 MG/DL (ref 70–130)
GLUCOSE BLDC GLUCOMTR-MCNC: 265 MG/DL (ref 70–130)
GLUCOSE BLDC GLUCOMTR-MCNC: 266 MG/DL (ref 70–130)
GLUCOSE BLDC GLUCOMTR-MCNC: 266 MG/DL (ref 70–130)
GLUCOSE BLDC GLUCOMTR-MCNC: 271 MG/DL (ref 70–130)
GLUCOSE BLDC GLUCOMTR-MCNC: 273 MG/DL (ref 70–130)
GLUCOSE BLDC GLUCOMTR-MCNC: 276 MG/DL (ref 70–130)
GLUCOSE BLDC GLUCOMTR-MCNC: 284 MG/DL (ref 70–130)
GLUCOSE BLDC GLUCOMTR-MCNC: 291 MG/DL (ref 70–130)
GLUCOSE BLDC GLUCOMTR-MCNC: 293 MG/DL (ref 70–130)
GLUCOSE BLDC GLUCOMTR-MCNC: 295 MG/DL (ref 70–130)
GLUCOSE BLDC GLUCOMTR-MCNC: 295 MG/DL (ref 70–130)
GLUCOSE BLDC GLUCOMTR-MCNC: 301 MG/DL (ref 70–130)
GLUCOSE BLDC GLUCOMTR-MCNC: 306 MG/DL (ref 70–130)
GLUCOSE BLDC GLUCOMTR-MCNC: 307 MG/DL (ref 70–130)
GLUCOSE BLDC GLUCOMTR-MCNC: 312 MG/DL (ref 70–130)
GLUCOSE BLDC GLUCOMTR-MCNC: 316 MG/DL (ref 70–130)
GLUCOSE BLDC GLUCOMTR-MCNC: 317 MG/DL (ref 70–130)
GLUCOSE BLDC GLUCOMTR-MCNC: 333 MG/DL (ref 70–130)
GLUCOSE BLDC GLUCOMTR-MCNC: 334 MG/DL (ref 70–130)
GLUCOSE BLDC GLUCOMTR-MCNC: 342 MG/DL (ref 70–130)
GLUCOSE BLDC GLUCOMTR-MCNC: 350 MG/DL (ref 70–130)
GLUCOSE BLDC GLUCOMTR-MCNC: 351 MG/DL (ref 70–130)
GLUCOSE BLDC GLUCOMTR-MCNC: 364 MG/DL (ref 70–130)
GLUCOSE BLDC GLUCOMTR-MCNC: 367 MG/DL (ref 70–130)
GLUCOSE BLDC GLUCOMTR-MCNC: 384 MG/DL (ref 70–130)
GLUCOSE BLDC GLUCOMTR-MCNC: 398 MG/DL (ref 70–130)
GLUCOSE BLDC GLUCOMTR-MCNC: 402 MG/DL (ref 70–130)
GLUCOSE BLDC GLUCOMTR-MCNC: 431 MG/DL (ref 70–130)
GLUCOSE BLDC GLUCOMTR-MCNC: 63 MG/DL (ref 70–130)
GLUCOSE BLDC GLUCOMTR-MCNC: 66 MG/DL (ref 70–130)
GLUCOSE BLDC GLUCOMTR-MCNC: 66 MG/DL (ref 70–130)
GLUCOSE BLDC GLUCOMTR-MCNC: 68 MG/DL (ref 70–130)
GLUCOSE BLDC GLUCOMTR-MCNC: 72 MG/DL (ref 70–130)
GLUCOSE BLDC GLUCOMTR-MCNC: 92 MG/DL (ref 70–130)
GLUCOSE BLDC GLUCOMTR-MCNC: 95 MG/DL (ref 70–130)
GLUCOSE BLDC GLUCOMTR-MCNC: 98 MG/DL (ref 70–130)
GLUCOSE BLDC GLUCOMTR-MCNC: 99 MG/DL (ref 70–130)
GLUCOSE SERPL-MCNC: 104 MG/DL (ref 65–99)
GLUCOSE SERPL-MCNC: 105 MG/DL (ref 65–99)
GLUCOSE SERPL-MCNC: 108 MG/DL (ref 65–99)
GLUCOSE SERPL-MCNC: 108 MG/DL (ref 65–99)
GLUCOSE SERPL-MCNC: 118 MG/DL (ref 65–99)
GLUCOSE SERPL-MCNC: 122 MG/DL (ref 65–99)
GLUCOSE SERPL-MCNC: 124 MG/DL (ref 65–99)
GLUCOSE SERPL-MCNC: 126 MG/DL (ref 65–99)
GLUCOSE SERPL-MCNC: 129 MG/DL (ref 65–99)
GLUCOSE SERPL-MCNC: 135 MG/DL (ref 65–99)
GLUCOSE SERPL-MCNC: 136 MG/DL (ref 65–99)
GLUCOSE SERPL-MCNC: 141 MG/DL (ref 65–99)
GLUCOSE SERPL-MCNC: 160 MG/DL (ref 65–99)
GLUCOSE SERPL-MCNC: 163 MG/DL (ref 65–99)
GLUCOSE SERPL-MCNC: 165 MG/DL (ref 65–99)
GLUCOSE SERPL-MCNC: 166 MG/DL (ref 65–99)
GLUCOSE SERPL-MCNC: 170 MG/DL (ref 65–99)
GLUCOSE SERPL-MCNC: 171 MG/DL (ref 65–99)
GLUCOSE SERPL-MCNC: 177 MG/DL (ref 65–99)
GLUCOSE SERPL-MCNC: 179 MG/DL (ref 65–99)
GLUCOSE SERPL-MCNC: 180 MG/DL (ref 65–99)
GLUCOSE SERPL-MCNC: 181 MG/DL (ref 65–99)
GLUCOSE SERPL-MCNC: 182 MG/DL (ref 65–99)
GLUCOSE SERPL-MCNC: 191 MG/DL (ref 65–99)
GLUCOSE SERPL-MCNC: 193 MG/DL (ref 65–99)
GLUCOSE SERPL-MCNC: 194 MG/DL (ref 65–99)
GLUCOSE SERPL-MCNC: 199 MG/DL (ref 65–99)
GLUCOSE SERPL-MCNC: 204 MG/DL (ref 65–99)
GLUCOSE SERPL-MCNC: 207 MG/DL (ref 65–99)
GLUCOSE SERPL-MCNC: 217 MG/DL (ref 65–99)
GLUCOSE SERPL-MCNC: 228 MG/DL (ref 65–99)
GLUCOSE SERPL-MCNC: 233 MG/DL (ref 65–99)
GLUCOSE SERPL-MCNC: 267 MG/DL (ref 65–99)
GLUCOSE SERPL-MCNC: 280 MG/DL (ref 65–99)
GLUCOSE SERPL-MCNC: 281 MG/DL (ref 65–99)
GLUCOSE SERPL-MCNC: 287 MG/DL (ref 65–99)
GLUCOSE SERPL-MCNC: 301 MG/DL (ref 65–99)
GLUCOSE SERPL-MCNC: 326 MG/DL (ref 65–99)
GLUCOSE SERPL-MCNC: 87 MG/DL (ref 65–99)
GLUCOSE SERPL-MCNC: 97 MG/DL (ref 65–99)
GLUCOSE UR STRIP-MCNC: NEGATIVE MG/DL
GRAM STN SPEC: ABNORMAL
HADV DNA SPEC NAA+PROBE: NOT DETECTED
HBA1C MFR BLD: 6.8 % (ref 4.8–5.6)
HCO3 BLDA-SCNC: 17.9 MMOL/L (ref 20–26)
HCO3 BLDA-SCNC: 18.9 MMOL/L (ref 20–26)
HCO3 BLDA-SCNC: 19.7 MMOL/L (ref 20–26)
HCO3 BLDA-SCNC: 21.1 MMOL/L (ref 20–26)
HCO3 BLDA-SCNC: 21.7 MMOL/L (ref 20–26)
HCO3 BLDA-SCNC: 22.8 MMOL/L (ref 20–26)
HCO3 BLDA-SCNC: 24.8 MMOL/L (ref 20–26)
HCO3 BLDA-SCNC: 25.1 MMOL/L (ref 20–26)
HCO3 BLDA-SCNC: 25.6 MMOL/L (ref 20–26)
HCO3 BLDA-SCNC: 25.7 MMOL/L (ref 20–26)
HCO3 BLDA-SCNC: 25.9 MMOL/L (ref 20–26)
HCO3 BLDA-SCNC: 26.3 MMOL/L (ref 20–26)
HCO3 BLDA-SCNC: 27.9 MMOL/L (ref 20–26)
HCO3 BLDA-SCNC: 29.3 MMOL/L (ref 20–26)
HCO3 BLDA-SCNC: 31.1 MMOL/L (ref 20–26)
HCOV 229E RNA SPEC QL NAA+PROBE: NOT DETECTED
HCOV HKU1 RNA SPEC QL NAA+PROBE: NOT DETECTED
HCOV NL63 RNA SPEC QL NAA+PROBE: NOT DETECTED
HCOV OC43 RNA SPEC QL NAA+PROBE: NOT DETECTED
HCT VFR BLD AUTO: 23 % (ref 34–46.6)
HCT VFR BLD AUTO: 23.4 % (ref 34–46.6)
HCT VFR BLD AUTO: 23.8 % (ref 34–46.6)
HCT VFR BLD AUTO: 24.1 % (ref 34–46.6)
HCT VFR BLD AUTO: 24.3 % (ref 34–46.6)
HCT VFR BLD AUTO: 24.5 % (ref 34–46.6)
HCT VFR BLD AUTO: 24.9 % (ref 34–46.6)
HCT VFR BLD AUTO: 25.5 % (ref 34–46.6)
HCT VFR BLD AUTO: 25.6 % (ref 34–46.6)
HCT VFR BLD AUTO: 26.2 % (ref 34–46.6)
HCT VFR BLD AUTO: 26.5 % (ref 34–46.6)
HCT VFR BLD AUTO: 26.8 % (ref 34–46.6)
HCT VFR BLD AUTO: 26.8 % (ref 34–46.6)
HCT VFR BLD AUTO: 27.1 % (ref 34–46.6)
HCT VFR BLD AUTO: 27.2 % (ref 34–46.6)
HCT VFR BLD AUTO: 27.4 % (ref 34–46.6)
HCT VFR BLD AUTO: 28.2 % (ref 34–46.6)
HCT VFR BLD AUTO: 29.3 % (ref 34–46.6)
HCT VFR BLD AUTO: 29.5 % (ref 34–46.6)
HCT VFR BLD AUTO: 30.1 % (ref 34–46.6)
HCT VFR BLD AUTO: 30.1 % (ref 34–46.6)
HCT VFR BLD AUTO: 30.6 % (ref 34–46.6)
HCT VFR BLD AUTO: 31.2 % (ref 34–46.6)
HCT VFR BLD AUTO: 31.9 % (ref 34–46.6)
HCT VFR BLD AUTO: 32.5 % (ref 34–46.6)
HCT VFR BLD AUTO: 33.6 % (ref 34–46.6)
HCT VFR BLD AUTO: 34.1 % (ref 34–46.6)
HCT VFR BLD AUTO: 34.4 % (ref 34–46.6)
HCT VFR BLD AUTO: 34.7 % (ref 34–46.6)
HCT VFR BLD AUTO: 35.4 % (ref 34–46.6)
HCT VFR BLD AUTO: 35.5 % (ref 34–46.6)
HCT VFR BLD AUTO: 36.9 % (ref 34–46.6)
HCT VFR BLD AUTO: 37.3 % (ref 34–46.6)
HCT VFR BLD AUTO: 37.8 % (ref 34–46.6)
HCT VFR BLD AUTO: 37.8 % (ref 34–46.6)
HCT VFR BLD AUTO: 38.6 % (ref 34–46.6)
HCT VFR BLD AUTO: 38.8 % (ref 34–46.6)
HCT VFR BLD AUTO: 39.5 % (ref 34–46.6)
HCT VFR BLD AUTO: 40.8 % (ref 34–46.6)
HCT VFR BLD CALC: 21.6 %
HCT VFR BLD CALC: 28.1 %
HCT VFR BLD CALC: 29.3 %
HCT VFR BLD CALC: 30 %
HCT VFR BLD CALC: 31.1 %
HCT VFR BLD CALC: 31.6 %
HCT VFR BLD CALC: 32.6 %
HCT VFR BLD CALC: 33.7 %
HCT VFR BLD CALC: 34.2 %
HCT VFR BLD CALC: 34.3 %
HCT VFR BLD CALC: 34.7 %
HCT VFR BLD CALC: 35 %
HCT VFR BLD CALC: 36.5 %
HCT VFR BLD CALC: 36.9 %
HCT VFR BLD CALC: 40.4 %
HDLC SERPL-MCNC: 30 MG/DL (ref 40–60)
HGB BLD-MCNC: 10.1 G/DL (ref 12–15.9)
HGB BLD-MCNC: 10.4 G/DL (ref 12–15.9)
HGB BLD-MCNC: 10.5 G/DL (ref 12–15.9)
HGB BLD-MCNC: 10.8 G/DL (ref 12–15.9)
HGB BLD-MCNC: 10.9 G/DL (ref 12–15.9)
HGB BLD-MCNC: 11 G/DL (ref 12–15.9)
HGB BLD-MCNC: 11.3 G/DL (ref 12–15.9)
HGB BLD-MCNC: 11.5 G/DL (ref 12–15.9)
HGB BLD-MCNC: 11.7 G/DL (ref 12–15.9)
HGB BLD-MCNC: 11.8 G/DL (ref 12–15.9)
HGB BLD-MCNC: 11.8 G/DL (ref 12–15.9)
HGB BLD-MCNC: 11.9 G/DL (ref 12–15.9)
HGB BLD-MCNC: 12.4 G/DL (ref 12–15.9)
HGB BLD-MCNC: 7 G/DL (ref 12–15.9)
HGB BLD-MCNC: 7.1 G/DL (ref 12–15.9)
HGB BLD-MCNC: 7.3 G/DL (ref 12–15.9)
HGB BLD-MCNC: 7.4 G/DL (ref 12–15.9)
HGB BLD-MCNC: 7.5 G/DL (ref 12–15.9)
HGB BLD-MCNC: 7.6 G/DL (ref 12–15.9)
HGB BLD-MCNC: 7.7 G/DL (ref 12–15.9)
HGB BLD-MCNC: 7.8 G/DL (ref 12–15.9)
HGB BLD-MCNC: 7.9 G/DL (ref 12–15.9)
HGB BLD-MCNC: 7.9 G/DL (ref 12–15.9)
HGB BLD-MCNC: 8.1 G/DL (ref 12–15.9)
HGB BLD-MCNC: 8.2 G/DL (ref 12–15.9)
HGB BLD-MCNC: 8.3 G/DL (ref 12–15.9)
HGB BLD-MCNC: 8.4 G/DL (ref 12–15.9)
HGB BLD-MCNC: 8.4 G/DL (ref 12–15.9)
HGB BLD-MCNC: 8.8 G/DL (ref 12–15.9)
HGB BLD-MCNC: 8.8 G/DL (ref 12–15.9)
HGB BLD-MCNC: 9.2 G/DL (ref 12–15.9)
HGB BLD-MCNC: 9.2 G/DL (ref 12–15.9)
HGB BLD-MCNC: 9.3 G/DL (ref 12–15.9)
HGB BLD-MCNC: 9.3 G/DL (ref 12–15.9)
HGB BLD-MCNC: 9.7 G/DL (ref 12–15.9)
HGB BLD-MCNC: 9.8 G/DL (ref 12–15.9)
HGB BLD-MCNC: 9.8 G/DL (ref 12–15.9)
HGB BLDA-MCNC: 10.1 G/DL (ref 14–18)
HGB BLDA-MCNC: 10.3 G/DL (ref 14–18)
HGB BLDA-MCNC: 10.6 G/DL (ref 14–18)
HGB BLDA-MCNC: 11 G/DL (ref 14–18)
HGB BLDA-MCNC: 11.2 G/DL (ref 14–18)
HGB BLDA-MCNC: 11.2 G/DL (ref 14–18)
HGB BLDA-MCNC: 11.3 G/DL (ref 14–18)
HGB BLDA-MCNC: 11.4 G/DL (ref 14–18)
HGB BLDA-MCNC: 11.9 G/DL (ref 14–18)
HGB BLDA-MCNC: 12.1 G/DL (ref 14–18)
HGB BLDA-MCNC: 13.2 G/DL (ref 14–18)
HGB BLDA-MCNC: 7 G/DL (ref 14–18)
HGB BLDA-MCNC: 9.2 G/DL (ref 14–18)
HGB BLDA-MCNC: 9.5 G/DL (ref 14–18)
HGB BLDA-MCNC: 9.8 G/DL (ref 14–18)
HGB UR QL STRIP.AUTO: ABNORMAL
HGB UR QL STRIP.AUTO: NEGATIVE
HMPV RNA NPH QL NAA+NON-PROBE: NOT DETECTED
HOLD SPECIMEN: NORMAL
HOLD SPECIMEN: NORMAL
HPIV1 RNA SPEC QL NAA+PROBE: NOT DETECTED
HPIV2 RNA SPEC QL NAA+PROBE: NOT DETECTED
HPIV3 RNA NPH QL NAA+PROBE: NOT DETECTED
HPIV4 P GENE NPH QL NAA+PROBE: NOT DETECTED
HSV SPEC CULT: NEGATIVE
HYALINE CASTS UR QL AUTO: ABNORMAL /LPF
HYPOCHROMIA BLD QL: NORMAL
IMM GRANULOCYTES # BLD AUTO: 0.02 10*3/MM3 (ref 0–0.05)
IMM GRANULOCYTES # BLD AUTO: 0.03 10*3/MM3 (ref 0–0.05)
IMM GRANULOCYTES # BLD AUTO: 0.04 10*3/MM3 (ref 0–0.05)
IMM GRANULOCYTES # BLD AUTO: 0.05 10*3/MM3 (ref 0–0.05)
IMM GRANULOCYTES # BLD AUTO: 0.06 10*3/MM3 (ref 0–0.05)
IMM GRANULOCYTES # BLD AUTO: 0.07 10*3/MM3 (ref 0–0.05)
IMM GRANULOCYTES # BLD AUTO: 0.08 10*3/MM3 (ref 0–0.05)
IMM GRANULOCYTES # BLD AUTO: 0.09 10*3/MM3 (ref 0–0.05)
IMM GRANULOCYTES # BLD AUTO: 0.11 10*3/MM3 (ref 0–0.05)
IMM GRANULOCYTES # BLD AUTO: 0.14 10*3/MM3 (ref 0–0.05)
IMM GRANULOCYTES # BLD AUTO: 0.15 10*3/MM3 (ref 0–0.05)
IMM GRANULOCYTES # BLD AUTO: 0.18 10*3/MM3 (ref 0–0.05)
IMM GRANULOCYTES # BLD AUTO: 0.26 10*3/MM3 (ref 0–0.05)
IMM GRANULOCYTES # BLD AUTO: 0.26 10*3/MM3 (ref 0–0.05)
IMM GRANULOCYTES # BLD AUTO: 0.29 10*3/MM3 (ref 0–0.05)
IMM GRANULOCYTES # BLD AUTO: 0.38 10*3/MM3 (ref 0–0.05)
IMM GRANULOCYTES # BLD AUTO: 0.4 10*3/MM3 (ref 0–0.05)
IMM GRANULOCYTES NFR BLD AUTO: 0.4 % (ref 0–0.5)
IMM GRANULOCYTES NFR BLD AUTO: 0.5 % (ref 0–0.5)
IMM GRANULOCYTES NFR BLD AUTO: 0.6 % (ref 0–0.5)
IMM GRANULOCYTES NFR BLD AUTO: 0.6 % (ref 0–0.5)
IMM GRANULOCYTES NFR BLD AUTO: 0.7 % (ref 0–0.5)
IMM GRANULOCYTES NFR BLD AUTO: 0.8 % (ref 0–0.5)
IMM GRANULOCYTES NFR BLD AUTO: 0.9 % (ref 0–0.5)
IMM GRANULOCYTES NFR BLD AUTO: 1 % (ref 0–0.5)
IMM GRANULOCYTES NFR BLD AUTO: 1 % (ref 0–0.5)
IMM GRANULOCYTES NFR BLD AUTO: 1.5 % (ref 0–0.5)
IMM GRANULOCYTES NFR BLD AUTO: 1.6 % (ref 0–0.5)
IMM GRANULOCYTES NFR BLD AUTO: 1.8 % (ref 0–0.5)
IMM GRANULOCYTES NFR BLD AUTO: 1.9 % (ref 0–0.5)
IMM GRANULOCYTES NFR BLD AUTO: 2.1 % (ref 0–0.5)
IMM GRANULOCYTES NFR BLD AUTO: 2.2 % (ref 0–0.5)
IMM GRANULOCYTES NFR BLD AUTO: 2.5 % (ref 0–0.5)
INHALED O2 CONCENTRATION: 21 %
INHALED O2 CONCENTRATION: 40 %
INHALED O2 CONCENTRATION: 45 %
INHALED O2 CONCENTRATION: 45 %
INHALED O2 CONCENTRATION: 50 %
INHALED O2 CONCENTRATION: 50 %
INHALED O2 CONCENTRATION: 55 %
INHALED O2 CONCENTRATION: 60 %
INHALED O2 CONCENTRATION: 80 %
INHALED O2 CONCENTRATION: 85 %
INHALED O2 CONCENTRATION: 90 %
INR PPP: 1.26 (ref 0.85–1.16)
INR PPP: 1.32 (ref 0.85–1.16)
IPAP: 0
KETONES UR QL STRIP: ABNORMAL
KETONES UR QL STRIP: NEGATIVE
LDH SERPL-CCNC: 250 U/L (ref 135–214)
LDH SERPL-CCNC: 258 U/L (ref 135–214)
LDH SERPL-CCNC: 280 U/L (ref 135–214)
LDH SERPL-CCNC: 297 U/L (ref 135–214)
LDH SERPL-CCNC: 307 U/L (ref 135–214)
LDH SERPL-CCNC: 309 U/L (ref 135–214)
LDH SERPL-CCNC: 331 U/L (ref 135–214)
LDH SERPL-CCNC: 347 U/L (ref 135–214)
LDH SERPL-CCNC: 378 U/L (ref 135–214)
LDH SERPL-CCNC: 395 U/L (ref 135–214)
LDH SERPL-CCNC: 412 U/L (ref 135–214)
LDH SERPL-CCNC: 424 U/L (ref 135–214)
LDH SERPL-CCNC: 430 U/L (ref 135–214)
LDH SERPL-CCNC: 514 U/L (ref 135–214)
LDH SERPL-CCNC: 520 U/L (ref 135–214)
LDH SERPL-CCNC: 861 U/L (ref 135–214)
LDLC SERPL CALC-MCNC: 57 MG/DL (ref 0–100)
LDLC/HDLC SERPL: 1.61 {RATIO}
LEFT ATRIUM VOLUME INDEX: 31.5 ML/M^2
LEFT ATRIUM VOLUME: 69 ML
LEUKOCYTE ESTERASE UR QL STRIP.AUTO: ABNORMAL
LEUKOCYTE ESTERASE UR QL STRIP.AUTO: NEGATIVE
LV EF 2D ECHO EST: 67 %
LYMPHOCYTES # BLD AUTO: 0.19 10*3/MM3 (ref 0.7–3.1)
LYMPHOCYTES # BLD AUTO: 0.34 10*3/MM3 (ref 0.7–3.1)
LYMPHOCYTES # BLD AUTO: 0.49 10*3/MM3 (ref 0.7–3.1)
LYMPHOCYTES # BLD AUTO: 0.5 10*3/MM3 (ref 0.7–3.1)
LYMPHOCYTES # BLD AUTO: 0.51 10*3/MM3 (ref 0.7–3.1)
LYMPHOCYTES # BLD AUTO: 0.59 10*3/MM3 (ref 0.7–3.1)
LYMPHOCYTES # BLD AUTO: 0.64 10*3/MM3 (ref 0.7–3.1)
LYMPHOCYTES # BLD AUTO: 0.73 10*3/MM3 (ref 0.7–3.1)
LYMPHOCYTES # BLD AUTO: 0.75 10*3/MM3 (ref 0.7–3.1)
LYMPHOCYTES # BLD AUTO: 0.81 10*3/MM3 (ref 0.7–3.1)
LYMPHOCYTES # BLD AUTO: 0.85 10*3/MM3 (ref 0.7–3.1)
LYMPHOCYTES # BLD AUTO: 0.85 10*3/MM3 (ref 0.7–3.1)
LYMPHOCYTES # BLD AUTO: 1 10*3/MM3 (ref 0.7–3.1)
LYMPHOCYTES # BLD AUTO: 1.01 10*3/MM3 (ref 0.7–3.1)
LYMPHOCYTES # BLD AUTO: 1.11 10*3/MM3 (ref 0.7–3.1)
LYMPHOCYTES # BLD AUTO: 1.21 10*3/MM3 (ref 0.7–3.1)
LYMPHOCYTES # BLD AUTO: 1.24 10*3/MM3 (ref 0.7–3.1)
LYMPHOCYTES # BLD AUTO: 1.28 10*3/MM3 (ref 0.7–3.1)
LYMPHOCYTES # BLD AUTO: 1.49 10*3/MM3 (ref 0.7–3.1)
LYMPHOCYTES # BLD AUTO: 1.52 10*3/MM3 (ref 0.7–3.1)
LYMPHOCYTES # BLD AUTO: 1.56 10*3/MM3 (ref 0.7–3.1)
LYMPHOCYTES # BLD AUTO: 1.7 10*3/MM3 (ref 0.7–3.1)
LYMPHOCYTES # BLD AUTO: 1.8 10*3/MM3 (ref 0.7–3.1)
LYMPHOCYTES # BLD AUTO: 1.83 10*3/MM3 (ref 0.7–3.1)
LYMPHOCYTES # BLD AUTO: 2 10*3/MM3 (ref 0.7–3.1)
LYMPHOCYTES # BLD AUTO: 2.1 10*3/MM3 (ref 0.7–3.1)
LYMPHOCYTES # BLD AUTO: 2.11 10*3/MM3 (ref 0.7–3.1)
LYMPHOCYTES # BLD AUTO: 2.89 10*3/MM3 (ref 0.7–3.1)
LYMPHOCYTES # BLD MANUAL: 0.18 10*3/MM3 (ref 0.7–3.1)
LYMPHOCYTES # BLD MANUAL: 0.33 10*3/MM3 (ref 0.7–3.1)
LYMPHOCYTES # BLD MANUAL: 0.64 10*3/MM3 (ref 0.7–3.1)
LYMPHOCYTES # BLD MANUAL: 1.18 10*3/MM3 (ref 0.7–3.1)
LYMPHOCYTES # BLD MANUAL: 3.31 10*3/MM3 (ref 0.7–3.1)
LYMPHOCYTES NFR BLD AUTO: 10.4 % (ref 19.6–45.3)
LYMPHOCYTES NFR BLD AUTO: 12.5 % (ref 19.6–45.3)
LYMPHOCYTES NFR BLD AUTO: 12.9 % (ref 19.6–45.3)
LYMPHOCYTES NFR BLD AUTO: 13.1 % (ref 19.6–45.3)
LYMPHOCYTES NFR BLD AUTO: 15.3 % (ref 19.6–45.3)
LYMPHOCYTES NFR BLD AUTO: 15.7 % (ref 19.6–45.3)
LYMPHOCYTES NFR BLD AUTO: 15.8 % (ref 19.6–45.3)
LYMPHOCYTES NFR BLD AUTO: 16.4 % (ref 19.6–45.3)
LYMPHOCYTES NFR BLD AUTO: 20.6 % (ref 19.6–45.3)
LYMPHOCYTES NFR BLD AUTO: 22.3 % (ref 19.6–45.3)
LYMPHOCYTES NFR BLD AUTO: 22.6 % (ref 19.6–45.3)
LYMPHOCYTES NFR BLD AUTO: 23.8 % (ref 19.6–45.3)
LYMPHOCYTES NFR BLD AUTO: 24.1 % (ref 19.6–45.3)
LYMPHOCYTES NFR BLD AUTO: 24.4 % (ref 19.6–45.3)
LYMPHOCYTES NFR BLD AUTO: 26.6 % (ref 19.6–45.3)
LYMPHOCYTES NFR BLD AUTO: 3.2 % (ref 19.6–45.3)
LYMPHOCYTES NFR BLD AUTO: 3.2 % (ref 19.6–45.3)
LYMPHOCYTES NFR BLD AUTO: 3.3 % (ref 19.6–45.3)
LYMPHOCYTES NFR BLD AUTO: 4.3 % (ref 19.6–45.3)
LYMPHOCYTES NFR BLD AUTO: 5.8 % (ref 19.6–45.3)
LYMPHOCYTES NFR BLD AUTO: 5.9 % (ref 19.6–45.3)
LYMPHOCYTES NFR BLD AUTO: 7 % (ref 19.6–45.3)
LYMPHOCYTES NFR BLD AUTO: 7.6 % (ref 19.6–45.3)
LYMPHOCYTES NFR BLD AUTO: 7.6 % (ref 19.6–45.3)
LYMPHOCYTES NFR BLD AUTO: 8.7 % (ref 19.6–45.3)
LYMPHOCYTES NFR BLD AUTO: 8.8 % (ref 19.6–45.3)
LYMPHOCYTES NFR BLD AUTO: 9 % (ref 19.6–45.3)
LYMPHOCYTES NFR BLD AUTO: 9.5 % (ref 19.6–45.3)
LYMPHOCYTES NFR BLD MANUAL: 1 % (ref 19.6–45.3)
LYMPHOCYTES NFR BLD MANUAL: 1 % (ref 5–12)
LYMPHOCYTES NFR BLD MANUAL: 10 % (ref 5–12)
LYMPHOCYTES NFR BLD MANUAL: 12 % (ref 19.6–45.3)
LYMPHOCYTES NFR BLD MANUAL: 18 % (ref 19.6–45.3)
LYMPHOCYTES NFR BLD MANUAL: 2 % (ref 19.6–45.3)
LYMPHOCYTES NFR BLD MANUAL: 2 % (ref 5–12)
LYMPHOCYTES NFR BLD MANUAL: 2 % (ref 5–12)
LYMPHOCYTES NFR BLD MANUAL: 3 % (ref 19.6–45.3)
LYMPHOCYTES NFR BLD MANUAL: 4 % (ref 5–12)
Lab: ABNORMAL
M PNEUMO IGG SER IA-ACNC: NOT DETECTED
MACROCYTES BLD QL SMEAR: ABNORMAL
MAGNESIUM SERPL-MCNC: 1.8 MG/DL (ref 1.6–2.4)
MAGNESIUM SERPL-MCNC: 1.9 MG/DL (ref 1.6–2.4)
MAGNESIUM SERPL-MCNC: 2 MG/DL (ref 1.6–2.4)
MAGNESIUM SERPL-MCNC: 2.1 MG/DL (ref 1.6–2.4)
MAGNESIUM SERPL-MCNC: 2.1 MG/DL (ref 1.6–2.4)
MAGNESIUM SERPL-MCNC: 2.2 MG/DL (ref 1.6–2.4)
MAGNESIUM SERPL-MCNC: 2.2 MG/DL (ref 1.6–2.4)
MAGNESIUM SERPL-MCNC: 2.3 MG/DL (ref 1.6–2.4)
MAGNESIUM SERPL-MCNC: 2.4 MG/DL (ref 1.6–2.4)
MAGNESIUM SERPL-MCNC: 2.5 MG/DL (ref 1.6–2.4)
MAGNESIUM SERPL-MCNC: 2.6 MG/DL (ref 1.6–2.4)
MAGNESIUM SERPL-MCNC: 2.7 MG/DL (ref 1.6–2.4)
MAGNESIUM SERPL-MCNC: 2.9 MG/DL (ref 1.6–2.4)
MAGNESIUM SERPL-MCNC: 3 MG/DL (ref 1.6–2.4)
MAGNESIUM SERPL-MCNC: 3.1 MG/DL (ref 1.6–2.4)
MCH RBC QN AUTO: 27 PG (ref 26.6–33)
MCH RBC QN AUTO: 27.1 PG (ref 26.6–33)
MCH RBC QN AUTO: 27.2 PG (ref 26.6–33)
MCH RBC QN AUTO: 27.4 PG (ref 26.6–33)
MCH RBC QN AUTO: 27.5 PG (ref 26.6–33)
MCH RBC QN AUTO: 27.5 PG (ref 26.6–33)
MCH RBC QN AUTO: 27.6 PG (ref 26.6–33)
MCH RBC QN AUTO: 27.6 PG (ref 26.6–33)
MCH RBC QN AUTO: 27.7 PG (ref 26.6–33)
MCH RBC QN AUTO: 27.8 PG (ref 26.6–33)
MCH RBC QN AUTO: 27.8 PG (ref 26.6–33)
MCH RBC QN AUTO: 27.9 PG (ref 26.6–33)
MCH RBC QN AUTO: 28 PG (ref 26.6–33)
MCH RBC QN AUTO: 28.1 PG (ref 26.6–33)
MCH RBC QN AUTO: 28.1 PG (ref 26.6–33)
MCH RBC QN AUTO: 28.2 PG (ref 26.6–33)
MCH RBC QN AUTO: 28.3 PG (ref 26.6–33)
MCH RBC QN AUTO: 28.4 PG (ref 26.6–33)
MCH RBC QN AUTO: 28.5 PG (ref 26.6–33)
MCH RBC QN AUTO: 28.6 PG (ref 26.6–33)
MCH RBC QN AUTO: 28.6 PG (ref 26.6–33)
MCH RBC QN AUTO: 28.7 PG (ref 26.6–33)
MCH RBC QN AUTO: 28.8 PG (ref 26.6–33)
MCH RBC QN AUTO: 28.9 PG (ref 26.6–33)
MCH RBC QN AUTO: 29 PG (ref 26.6–33)
MCHC RBC AUTO-ENTMCNC: 27.9 G/DL (ref 31.5–35.7)
MCHC RBC AUTO-ENTMCNC: 28.5 G/DL (ref 31.5–35.7)
MCHC RBC AUTO-ENTMCNC: 28.6 G/DL (ref 31.5–35.7)
MCHC RBC AUTO-ENTMCNC: 29.1 G/DL (ref 31.5–35.7)
MCHC RBC AUTO-ENTMCNC: 29.1 G/DL (ref 31.5–35.7)
MCHC RBC AUTO-ENTMCNC: 29.8 G/DL (ref 31.5–35.7)
MCHC RBC AUTO-ENTMCNC: 29.9 G/DL (ref 31.5–35.7)
MCHC RBC AUTO-ENTMCNC: 29.9 G/DL (ref 31.5–35.7)
MCHC RBC AUTO-ENTMCNC: 30 G/DL (ref 31.5–35.7)
MCHC RBC AUTO-ENTMCNC: 30.1 G/DL (ref 31.5–35.7)
MCHC RBC AUTO-ENTMCNC: 30.2 G/DL (ref 31.5–35.7)
MCHC RBC AUTO-ENTMCNC: 30.3 G/DL (ref 31.5–35.7)
MCHC RBC AUTO-ENTMCNC: 30.4 G/DL (ref 31.5–35.7)
MCHC RBC AUTO-ENTMCNC: 30.5 G/DL (ref 31.5–35.7)
MCHC RBC AUTO-ENTMCNC: 30.6 G/DL (ref 31.5–35.7)
MCHC RBC AUTO-ENTMCNC: 30.7 G/DL (ref 31.5–35.7)
MCHC RBC AUTO-ENTMCNC: 30.7 G/DL (ref 31.5–35.7)
MCHC RBC AUTO-ENTMCNC: 30.8 G/DL (ref 31.5–35.7)
MCHC RBC AUTO-ENTMCNC: 30.8 G/DL (ref 31.5–35.7)
MCHC RBC AUTO-ENTMCNC: 30.9 G/DL (ref 31.5–35.7)
MCHC RBC AUTO-ENTMCNC: 30.9 G/DL (ref 31.5–35.7)
MCHC RBC AUTO-ENTMCNC: 31.2 G/DL (ref 31.5–35.7)
MCHC RBC AUTO-ENTMCNC: 31.2 G/DL (ref 31.5–35.7)
MCHC RBC AUTO-ENTMCNC: 31.5 G/DL (ref 31.5–35.7)
MCHC RBC AUTO-ENTMCNC: 31.6 G/DL (ref 31.5–35.7)
MCV RBC AUTO: 101.3 FL (ref 79–97)
MCV RBC AUTO: 102 FL (ref 79–97)
MCV RBC AUTO: 88.6 FL (ref 79–97)
MCV RBC AUTO: 89.1 FL (ref 79–97)
MCV RBC AUTO: 89.2 FL (ref 79–97)
MCV RBC AUTO: 89.5 FL (ref 79–97)
MCV RBC AUTO: 89.8 FL (ref 79–97)
MCV RBC AUTO: 90 FL (ref 79–97)
MCV RBC AUTO: 90.2 FL (ref 79–97)
MCV RBC AUTO: 90.7 FL (ref 79–97)
MCV RBC AUTO: 91 FL (ref 79–97)
MCV RBC AUTO: 91.1 FL (ref 79–97)
MCV RBC AUTO: 91.4 FL (ref 79–97)
MCV RBC AUTO: 91.5 FL (ref 79–97)
MCV RBC AUTO: 91.7 FL (ref 79–97)
MCV RBC AUTO: 91.9 FL (ref 79–97)
MCV RBC AUTO: 92 FL (ref 79–97)
MCV RBC AUTO: 92.1 FL (ref 79–97)
MCV RBC AUTO: 92.2 FL (ref 79–97)
MCV RBC AUTO: 92.3 FL (ref 79–97)
MCV RBC AUTO: 92.7 FL (ref 79–97)
MCV RBC AUTO: 92.8 FL (ref 79–97)
MCV RBC AUTO: 92.9 FL (ref 79–97)
MCV RBC AUTO: 93 FL (ref 79–97)
MCV RBC AUTO: 93.1 FL (ref 79–97)
MCV RBC AUTO: 93.4 FL (ref 79–97)
MCV RBC AUTO: 93.4 FL (ref 79–97)
MCV RBC AUTO: 94.2 FL (ref 79–97)
MCV RBC AUTO: 94.8 FL (ref 79–97)
MCV RBC AUTO: 95 FL (ref 79–97)
MCV RBC AUTO: 95 FL (ref 79–97)
MCV RBC AUTO: 96 FL (ref 79–97)
MCV RBC AUTO: 96.1 FL (ref 79–97)
MCV RBC AUTO: 96.7 FL (ref 79–97)
MCV RBC AUTO: 97.4 FL (ref 79–97)
METAMYELOCYTES NFR BLD MANUAL: 1 % (ref 0–0)
METAMYELOCYTES NFR BLD MANUAL: 1 % (ref 0–0)
METAMYELOCYTES NFR BLD MANUAL: 2 % (ref 0–0)
METAMYELOCYTES NFR BLD MANUAL: 3 % (ref 0–0)
METHGB BLD QL: 0.2 % (ref 0–1.5)
METHGB BLD QL: 0.2 % (ref 0–1.5)
METHGB BLD QL: 0.3 % (ref 0–1.5)
METHGB BLD QL: 0.4 % (ref 0–1.5)
METHGB BLD QL: 0.6 % (ref 0–1.5)
METHGB BLD QL: 0.7 % (ref 0–1.5)
METHGB BLD QL: 0.7 % (ref 0–1.5)
METHGB BLD QL: 0.8 % (ref 0–1.5)
MODALITY: ABNORMAL
MONOCYTES # BLD AUTO: 0.08 10*3/MM3 (ref 0.1–0.9)
MONOCYTES # BLD AUTO: 0.11 10*3/MM3 (ref 0.1–0.9)
MONOCYTES # BLD AUTO: 0.14 10*3/MM3 (ref 0.1–0.9)
MONOCYTES # BLD AUTO: 0.21 10*3/MM3 (ref 0.1–0.9)
MONOCYTES # BLD AUTO: 0.22 10*3/MM3 (ref 0.1–0.9)
MONOCYTES # BLD AUTO: 0.29 10*3/MM3 (ref 0.1–0.9)
MONOCYTES # BLD AUTO: 0.3 10*3/MM3 (ref 0.1–0.9)
MONOCYTES # BLD AUTO: 0.31 10*3/MM3 (ref 0.1–0.9)
MONOCYTES # BLD AUTO: 0.33 10*3/MM3 (ref 0.1–0.9)
MONOCYTES # BLD AUTO: 0.35 10*3/MM3 (ref 0.1–0.9)
MONOCYTES # BLD AUTO: 0.35 10*3/MM3 (ref 0.1–0.9)
MONOCYTES # BLD AUTO: 0.39 10*3/MM3 (ref 0.1–0.9)
MONOCYTES # BLD AUTO: 0.4 10*3/MM3 (ref 0.1–0.9)
MONOCYTES # BLD AUTO: 0.4 10*3/MM3 (ref 0.1–0.9)
MONOCYTES # BLD AUTO: 0.43 10*3/MM3 (ref 0.1–0.9)
MONOCYTES # BLD AUTO: 0.44 10*3/MM3 (ref 0.1–0.9)
MONOCYTES # BLD AUTO: 0.44 10*3/MM3 (ref 0.1–0.9)
MONOCYTES # BLD AUTO: 0.46 10*3/MM3 (ref 0.1–0.9)
MONOCYTES # BLD AUTO: 0.48 10*3/MM3 (ref 0.1–0.9)
MONOCYTES # BLD AUTO: 0.5 10*3/MM3 (ref 0.1–0.9)
MONOCYTES # BLD AUTO: 0.53 10*3/MM3 (ref 0.1–0.9)
MONOCYTES # BLD AUTO: 0.54 10*3/MM3 (ref 0.1–0.9)
MONOCYTES # BLD AUTO: 0.55 10*3/MM3 (ref 0.1–0.9)
MONOCYTES # BLD AUTO: 0.59 10*3/MM3 (ref 0.1–0.9)
MONOCYTES # BLD AUTO: 0.6 10*3/MM3 (ref 0.1–0.9)
MONOCYTES # BLD AUTO: 0.6 10*3/MM3 (ref 0.1–0.9)
MONOCYTES # BLD AUTO: 0.65 10*3/MM3 (ref 0.1–0.9)
MONOCYTES # BLD AUTO: 0.67 10*3/MM3 (ref 0.1–0.9)
MONOCYTES # BLD AUTO: 0.7 10*3/MM3 (ref 0.1–0.9)
MONOCYTES # BLD AUTO: 0.7 10*3/MM3 (ref 0.1–0.9)
MONOCYTES # BLD AUTO: 0.73 10*3/MM3 (ref 0.1–0.9)
MONOCYTES # BLD AUTO: 0.74 10*3/MM3 (ref 0.1–0.9)
MONOCYTES # BLD AUTO: 0.99 10*3/MM3 (ref 0.1–0.9)
MONOCYTES NFR BLD AUTO: 1.9 % (ref 5–12)
MONOCYTES NFR BLD AUTO: 2 % (ref 5–12)
MONOCYTES NFR BLD AUTO: 2.3 % (ref 5–12)
MONOCYTES NFR BLD AUTO: 2.5 % (ref 5–12)
MONOCYTES NFR BLD AUTO: 3.1 % (ref 5–12)
MONOCYTES NFR BLD AUTO: 3.2 % (ref 5–12)
MONOCYTES NFR BLD AUTO: 3.4 % (ref 5–12)
MONOCYTES NFR BLD AUTO: 3.7 % (ref 5–12)
MONOCYTES NFR BLD AUTO: 3.7 % (ref 5–12)
MONOCYTES NFR BLD AUTO: 3.9 % (ref 5–12)
MONOCYTES NFR BLD AUTO: 4 % (ref 5–12)
MONOCYTES NFR BLD AUTO: 4.1 % (ref 5–12)
MONOCYTES NFR BLD AUTO: 4.6 % (ref 5–12)
MONOCYTES NFR BLD AUTO: 4.8 % (ref 5–12)
MONOCYTES NFR BLD AUTO: 5 % (ref 5–12)
MONOCYTES NFR BLD AUTO: 5 % (ref 5–12)
MONOCYTES NFR BLD AUTO: 5.3 % (ref 5–12)
MONOCYTES NFR BLD AUTO: 5.4 % (ref 5–12)
MONOCYTES NFR BLD AUTO: 5.5 % (ref 5–12)
MONOCYTES NFR BLD AUTO: 5.7 % (ref 5–12)
MONOCYTES NFR BLD AUTO: 5.7 % (ref 5–12)
MONOCYTES NFR BLD AUTO: 6 % (ref 5–12)
MONOCYTES NFR BLD AUTO: 6.1 % (ref 5–12)
MONOCYTES NFR BLD AUTO: 6.1 % (ref 5–12)
MONOCYTES NFR BLD AUTO: 6.2 % (ref 5–12)
MONOCYTES NFR BLD AUTO: 7 % (ref 5–12)
MRSA DNA SPEC QL NAA+PROBE: NEGATIVE
MYELOCYTES NFR BLD MANUAL: 1 % (ref 0–0)
MYELOCYTES NFR BLD MANUAL: 4 % (ref 0–0)
NEGATIVE CONTROL: NEGATIVE
NEUTROPHILS # BLD AUTO: 12.32 10*3/MM3 (ref 1.7–7)
NEUTROPHILS # BLD AUTO: 15.53 10*3/MM3 (ref 1.7–7)
NEUTROPHILS # BLD AUTO: 16.47 10*3/MM3 (ref 1.7–7)
NEUTROPHILS # BLD AUTO: 20.22 10*3/MM3 (ref 1.7–7)
NEUTROPHILS # BLD AUTO: 7.3 10*3/MM3 (ref 1.7–7)
NEUTROPHILS NFR BLD AUTO: 12.91 10*3/MM3 (ref 1.7–7)
NEUTROPHILS NFR BLD AUTO: 13.59 10*3/MM3 (ref 1.7–7)
NEUTROPHILS NFR BLD AUTO: 15.44 10*3/MM3 (ref 1.7–7)
NEUTROPHILS NFR BLD AUTO: 16.68 10*3/MM3 (ref 1.7–7)
NEUTROPHILS NFR BLD AUTO: 18.52 10*3/MM3 (ref 1.7–7)
NEUTROPHILS NFR BLD AUTO: 2.16 10*3/MM3 (ref 1.7–7)
NEUTROPHILS NFR BLD AUTO: 22.87 10*3/MM3 (ref 1.7–7)
NEUTROPHILS NFR BLD AUTO: 4.18 10*3/MM3 (ref 1.7–7)
NEUTROPHILS NFR BLD AUTO: 4.43 10*3/MM3 (ref 1.7–7)
NEUTROPHILS NFR BLD AUTO: 4.88 10*3/MM3 (ref 1.7–7)
NEUTROPHILS NFR BLD AUTO: 4.93 10*3/MM3 (ref 1.7–7)
NEUTROPHILS NFR BLD AUTO: 5 10*3/MM3 (ref 1.7–7)
NEUTROPHILS NFR BLD AUTO: 5.06 10*3/MM3 (ref 1.7–7)
NEUTROPHILS NFR BLD AUTO: 5.13 10*3/MM3 (ref 1.7–7)
NEUTROPHILS NFR BLD AUTO: 5.38 10*3/MM3 (ref 1.7–7)
NEUTROPHILS NFR BLD AUTO: 5.66 10*3/MM3 (ref 1.7–7)
NEUTROPHILS NFR BLD AUTO: 5.78 10*3/MM3 (ref 1.7–7)
NEUTROPHILS NFR BLD AUTO: 5.98 10*3/MM3 (ref 1.7–7)
NEUTROPHILS NFR BLD AUTO: 6.17 10*3/MM3 (ref 1.7–7)
NEUTROPHILS NFR BLD AUTO: 6.84 10*3/MM3 (ref 1.7–7)
NEUTROPHILS NFR BLD AUTO: 6.94 10*3/MM3 (ref 1.7–7)
NEUTROPHILS NFR BLD AUTO: 62 % (ref 42.7–76)
NEUTROPHILS NFR BLD AUTO: 64.8 % (ref 42.7–76)
NEUTROPHILS NFR BLD AUTO: 65.4 % (ref 42.7–76)
NEUTROPHILS NFR BLD AUTO: 65.6 % (ref 42.7–76)
NEUTROPHILS NFR BLD AUTO: 66 % (ref 42.7–76)
NEUTROPHILS NFR BLD AUTO: 67.5 % (ref 42.7–76)
NEUTROPHILS NFR BLD AUTO: 69.1 % (ref 42.7–76)
NEUTROPHILS NFR BLD AUTO: 7.08 10*3/MM3 (ref 1.7–7)
NEUTROPHILS NFR BLD AUTO: 7.47 10*3/MM3 (ref 1.7–7)
NEUTROPHILS NFR BLD AUTO: 7.76 10*3/MM3 (ref 1.7–7)
NEUTROPHILS NFR BLD AUTO: 72.6 % (ref 42.7–76)
NEUTROPHILS NFR BLD AUTO: 75.9 % (ref 42.7–76)
NEUTROPHILS NFR BLD AUTO: 76.7 % (ref 42.7–76)
NEUTROPHILS NFR BLD AUTO: 77.5 % (ref 42.7–76)
NEUTROPHILS NFR BLD AUTO: 77.8 % (ref 42.7–76)
NEUTROPHILS NFR BLD AUTO: 78 % (ref 42.7–76)
NEUTROPHILS NFR BLD AUTO: 8.17 10*3/MM3 (ref 1.7–7)
NEUTROPHILS NFR BLD AUTO: 8.96 10*3/MM3 (ref 1.7–7)
NEUTROPHILS NFR BLD AUTO: 80.5 % (ref 42.7–76)
NEUTROPHILS NFR BLD AUTO: 82.6 % (ref 42.7–76)
NEUTROPHILS NFR BLD AUTO: 83.4 % (ref 42.7–76)
NEUTROPHILS NFR BLD AUTO: 83.8 % (ref 42.7–76)
NEUTROPHILS NFR BLD AUTO: 84.8 % (ref 42.7–76)
NEUTROPHILS NFR BLD AUTO: 86 % (ref 42.7–76)
NEUTROPHILS NFR BLD AUTO: 86.8 % (ref 42.7–76)
NEUTROPHILS NFR BLD AUTO: 88.2 % (ref 42.7–76)
NEUTROPHILS NFR BLD AUTO: 88.6 % (ref 42.7–76)
NEUTROPHILS NFR BLD AUTO: 89.1 % (ref 42.7–76)
NEUTROPHILS NFR BLD AUTO: 89.3 % (ref 42.7–76)
NEUTROPHILS NFR BLD AUTO: 89.5 % (ref 42.7–76)
NEUTROPHILS NFR BLD AUTO: 89.8 % (ref 42.7–76)
NEUTROPHILS NFR BLD AUTO: 9.34 10*3/MM3 (ref 1.7–7)
NEUTROPHILS NFR BLD AUTO: 9.58 10*3/MM3 (ref 1.7–7)
NEUTROPHILS NFR BLD AUTO: 90.2 % (ref 42.7–76)
NEUTROPHILS NFR BLD AUTO: 93.4 % (ref 42.7–76)
NEUTROPHILS NFR BLD MANUAL: 55 % (ref 42.7–76)
NEUTROPHILS NFR BLD MANUAL: 63 % (ref 42.7–76)
NEUTROPHILS NFR BLD MANUAL: 68 % (ref 42.7–76)
NEUTROPHILS NFR BLD MANUAL: 69 % (ref 42.7–76)
NEUTROPHILS NFR BLD MANUAL: 90 % (ref 42.7–76)
NEUTS BAND NFR BLD MANUAL: 26 % (ref 0–5)
NEUTS BAND NFR BLD MANUAL: 3 % (ref 0–5)
NEUTS BAND NFR BLD MANUAL: 39 % (ref 0–5)
NEUTS BAND NFR BLD MANUAL: 4 % (ref 0–5)
NEUTS BAND NFR BLD MANUAL: 6 % (ref 0–5)
NITRITE UR QL STRIP: NEGATIVE
NOTE: ABNORMAL
NOTIFIED BY: ABNORMAL
NOTIFIED BY: ABNORMAL
NOTIFIED WHO: ABNORMAL
NOTIFIED WHO: ABNORMAL
NRBC BLD AUTO-RTO: 0 /100 WBC (ref 0–0.2)
NRBC BLD AUTO-RTO: 0.1 /100 WBC (ref 0–0.2)
NRBC BLD AUTO-RTO: 0.2 /100 WBC (ref 0–0.2)
NRBC BLD AUTO-RTO: 0.3 /100 WBC (ref 0–0.2)
NRBC BLD AUTO-RTO: 0.4 /100 WBC (ref 0–0.2)
NRBC BLD AUTO-RTO: 0.8 /100 WBC (ref 0–0.2)
NRBC SPEC MANUAL: 1 /100 WBC (ref 0–0.2)
NRBC SPEC MANUAL: 10 /100 WBC (ref 0–0.2)
NT-PROBNP SERPL-MCNC: 492.4 PG/ML (ref 0–1800)
NT-PROBNP SERPL-MCNC: 9909 PG/ML (ref 0–1800)
OXYHGB MFR BLDV: 86.2 % (ref 94–99)
OXYHGB MFR BLDV: 88.6 % (ref 94–99)
OXYHGB MFR BLDV: 89 % (ref 94–99)
OXYHGB MFR BLDV: 89.3 % (ref 94–99)
OXYHGB MFR BLDV: 91.1 % (ref 94–99)
OXYHGB MFR BLDV: 92.1 % (ref 94–99)
OXYHGB MFR BLDV: 92.7 % (ref 94–99)
OXYHGB MFR BLDV: 93.5 % (ref 94–99)
OXYHGB MFR BLDV: 93.5 % (ref 94–99)
OXYHGB MFR BLDV: 93.6 % (ref 94–99)
OXYHGB MFR BLDV: 94 % (ref 94–99)
OXYHGB MFR BLDV: 94.2 % (ref 94–99)
OXYHGB MFR BLDV: 94.5 % (ref 94–99)
OXYHGB MFR BLDV: 95.2 % (ref 94–99)
OXYHGB MFR BLDV: 96.4 % (ref 94–99)
PAW @ PEAK INSP FLOW SETTING VENT: 0 CMH2O
PCO2 BLDA: 33.4 MM HG (ref 35–45)
PCO2 BLDA: 35.1 MM HG (ref 35–45)
PCO2 BLDA: 40.3 MM HG (ref 35–45)
PCO2 BLDA: 40.7 MM HG (ref 35–45)
PCO2 BLDA: 41.1 MM HG (ref 35–45)
PCO2 BLDA: 41.5 MM HG (ref 35–45)
PCO2 BLDA: 41.7 MM HG (ref 35–45)
PCO2 BLDA: 42.6 MM HG (ref 35–45)
PCO2 BLDA: 42.9 MM HG (ref 35–45)
PCO2 BLDA: 45 MM HG (ref 35–45)
PCO2 BLDA: 46.3 MM HG (ref 35–45)
PCO2 BLDA: 46.7 MM HG (ref 35–45)
PCO2 BLDA: 49.2 MM HG (ref 35–45)
PCO2 BLDA: 60.1 MM HG (ref 35–45)
PCO2 BLDA: 68.5 MM HG (ref 35–45)
PCO2 TEMP ADJ BLD: 33.4 MM HG (ref 35–45)
PCO2 TEMP ADJ BLD: 35.1 MM HG (ref 35–45)
PCO2 TEMP ADJ BLD: 40.3 MM HG (ref 35–45)
PCO2 TEMP ADJ BLD: 40.7 MM HG (ref 35–45)
PCO2 TEMP ADJ BLD: 41.1 MM HG (ref 35–45)
PCO2 TEMP ADJ BLD: 41.5 MM HG (ref 35–45)
PCO2 TEMP ADJ BLD: 41.7 MM HG (ref 35–45)
PCO2 TEMP ADJ BLD: 42.6 MM HG (ref 35–45)
PCO2 TEMP ADJ BLD: 42.9 MM HG (ref 35–45)
PCO2 TEMP ADJ BLD: 45 MM HG (ref 35–45)
PCO2 TEMP ADJ BLD: 46.3 MM HG (ref 35–45)
PCO2 TEMP ADJ BLD: 46.7 MM HG (ref 35–45)
PCO2 TEMP ADJ BLD: 49.2 MM HG (ref 35–45)
PCO2 TEMP ADJ BLD: 60.1 MM HG (ref 35–45)
PCO2 TEMP ADJ BLD: 68.5 MM HG (ref 35–45)
PEEP RESPIRATORY: 10 CM[H2O]
PEEP RESPIRATORY: 8 CM[H2O]
PEEP RESPIRATORY: 8 CM[H2O]
PH BLDA: 7.11 PH UNITS (ref 7.35–7.45)
PH BLDA: 7.16 PH UNITS (ref 7.35–7.45)
PH BLDA: 7.23 PH UNITS (ref 7.35–7.45)
PH BLDA: 7.28 PH UNITS (ref 7.35–7.45)
PH BLDA: 7.29 PH UNITS (ref 7.35–7.45)
PH BLDA: 7.34 PH UNITS (ref 7.35–7.45)
PH BLDA: 7.35 PH UNITS (ref 7.35–7.45)
PH BLDA: 7.35 PH UNITS (ref 7.35–7.45)
PH BLDA: 7.37 PH UNITS (ref 7.35–7.45)
PH BLDA: 7.38 PH UNITS (ref 7.35–7.45)
PH BLDA: 7.44 PH UNITS (ref 7.35–7.45)
PH BLDA: 7.44 PH UNITS (ref 7.35–7.45)
PH BLDA: 7.46 PH UNITS (ref 7.35–7.45)
PH BLDA: 7.46 PH UNITS (ref 7.35–7.45)
PH BLDA: 7.48 PH UNITS (ref 7.35–7.45)
PH UR STRIP.AUTO: 6 [PH] (ref 5–8)
PH UR STRIP.AUTO: 6.5 [PH] (ref 5–8)
PH UR STRIP.AUTO: 8 [PH] (ref 5–8)
PH UR STRIP.AUTO: <=5 [PH] (ref 5–8)
PH, TEMP CORRECTED: 7.11 PH UNITS
PH, TEMP CORRECTED: 7.16 PH UNITS
PH, TEMP CORRECTED: 7.23 PH UNITS
PH, TEMP CORRECTED: 7.28 PH UNITS
PH, TEMP CORRECTED: 7.29 PH UNITS
PH, TEMP CORRECTED: 7.34 PH UNITS
PH, TEMP CORRECTED: 7.35 PH UNITS
PH, TEMP CORRECTED: 7.35 PH UNITS
PH, TEMP CORRECTED: 7.37 PH UNITS
PH, TEMP CORRECTED: 7.38 PH UNITS
PH, TEMP CORRECTED: 7.44 PH UNITS
PH, TEMP CORRECTED: 7.44 PH UNITS
PH, TEMP CORRECTED: 7.46 PH UNITS
PH, TEMP CORRECTED: 7.46 PH UNITS
PH, TEMP CORRECTED: 7.48 PH UNITS
PHOSPHATE SERPL-MCNC: 1.4 MG/DL (ref 2.5–4.5)
PHOSPHATE SERPL-MCNC: 2.1 MG/DL (ref 2.5–4.5)
PHOSPHATE SERPL-MCNC: 2.2 MG/DL (ref 2.5–4.5)
PHOSPHATE SERPL-MCNC: 2.2 MG/DL (ref 2.5–4.5)
PHOSPHATE SERPL-MCNC: 2.3 MG/DL (ref 2.5–4.5)
PHOSPHATE SERPL-MCNC: 2.4 MG/DL (ref 2.5–4.5)
PHOSPHATE SERPL-MCNC: 2.5 MG/DL (ref 2.5–4.5)
PHOSPHATE SERPL-MCNC: 2.6 MG/DL (ref 2.5–4.5)
PHOSPHATE SERPL-MCNC: 2.7 MG/DL (ref 2.5–4.5)
PHOSPHATE SERPL-MCNC: 2.8 MG/DL (ref 2.5–4.5)
PHOSPHATE SERPL-MCNC: 2.9 MG/DL (ref 2.5–4.5)
PHOSPHATE SERPL-MCNC: 3 MG/DL (ref 2.5–4.5)
PHOSPHATE SERPL-MCNC: 3 MG/DL (ref 2.5–4.5)
PHOSPHATE SERPL-MCNC: 3.1 MG/DL (ref 2.5–4.5)
PHOSPHATE SERPL-MCNC: 3.2 MG/DL (ref 2.5–4.5)
PHOSPHATE SERPL-MCNC: 3.2 MG/DL (ref 2.5–4.5)
PHOSPHATE SERPL-MCNC: 3.3 MG/DL (ref 2.5–4.5)
PHOSPHATE SERPL-MCNC: 3.4 MG/DL (ref 2.5–4.5)
PHOSPHATE SERPL-MCNC: 3.5 MG/DL (ref 2.5–4.5)
PHOSPHATE SERPL-MCNC: 3.7 MG/DL (ref 2.5–4.5)
PHOSPHATE SERPL-MCNC: 4 MG/DL (ref 2.5–4.5)
PHOSPHATE SERPL-MCNC: 4.2 MG/DL (ref 2.5–4.5)
PHOSPHATE SERPL-MCNC: 4.3 MG/DL (ref 2.5–4.5)
PHOSPHATE SERPL-MCNC: 7.5 MG/DL (ref 2.5–4.5)
PHOSPHATE SERPL-MCNC: 7.7 MG/DL (ref 2.5–4.5)
PHOSPHATE SERPL-MCNC: 8.2 MG/DL (ref 2.5–4.5)
PHOSPHATE SERPL-MCNC: 8.2 MG/DL (ref 2.5–4.5)
PHOSPHATE SERPL-MCNC: 9.2 MG/DL (ref 2.5–4.5)
PLAT MORPH BLD: NORMAL
PLATELET # BLD AUTO: 164 10*3/MM3 (ref 140–450)
PLATELET # BLD AUTO: 179 10*3/MM3 (ref 140–450)
PLATELET # BLD AUTO: 186 10*3/MM3 (ref 140–450)
PLATELET # BLD AUTO: 192 10*3/MM3 (ref 140–450)
PLATELET # BLD AUTO: 199 10*3/MM3 (ref 140–450)
PLATELET # BLD AUTO: 216 10*3/MM3 (ref 140–450)
PLATELET # BLD AUTO: 224 10*3/MM3 (ref 140–450)
PLATELET # BLD AUTO: 234 10*3/MM3 (ref 140–450)
PLATELET # BLD AUTO: 248 10*3/MM3 (ref 140–450)
PLATELET # BLD AUTO: 249 10*3/MM3 (ref 140–450)
PLATELET # BLD AUTO: 279 10*3/MM3 (ref 140–450)
PLATELET # BLD AUTO: 286 10*3/MM3 (ref 140–450)
PLATELET # BLD AUTO: 291 10*3/MM3 (ref 140–450)
PLATELET # BLD AUTO: 295 10*3/MM3 (ref 140–450)
PLATELET # BLD AUTO: 299 10*3/MM3 (ref 140–450)
PLATELET # BLD AUTO: 299 10*3/MM3 (ref 140–450)
PLATELET # BLD AUTO: 302 10*3/MM3 (ref 140–450)
PLATELET # BLD AUTO: 303 10*3/MM3 (ref 140–450)
PLATELET # BLD AUTO: 307 10*3/MM3 (ref 140–450)
PLATELET # BLD AUTO: 308 10*3/MM3 (ref 140–450)
PLATELET # BLD AUTO: 328 10*3/MM3 (ref 140–450)
PLATELET # BLD AUTO: 330 10*3/MM3 (ref 140–450)
PLATELET # BLD AUTO: 331 10*3/MM3 (ref 140–450)
PLATELET # BLD AUTO: 336 10*3/MM3 (ref 140–450)
PLATELET # BLD AUTO: 339 10*3/MM3 (ref 140–450)
PLATELET # BLD AUTO: 346 10*3/MM3 (ref 140–450)
PLATELET # BLD AUTO: 354 10*3/MM3 (ref 140–450)
PLATELET # BLD AUTO: 354 10*3/MM3 (ref 140–450)
PLATELET # BLD AUTO: 367 10*3/MM3 (ref 140–450)
PLATELET # BLD AUTO: 375 10*3/MM3 (ref 140–450)
PLATELET # BLD AUTO: 385 10*3/MM3 (ref 140–450)
PLATELET # BLD AUTO: 399 10*3/MM3 (ref 140–450)
PLATELET # BLD AUTO: 412 10*3/MM3 (ref 140–450)
PLATELET # BLD AUTO: 415 10*3/MM3 (ref 140–450)
PLATELET # BLD AUTO: 428 10*3/MM3 (ref 140–450)
PLATELET # BLD AUTO: 461 10*3/MM3 (ref 140–450)
PLATELET # BLD AUTO: 497 10*3/MM3 (ref 140–450)
PMV BLD AUTO: 10 FL (ref 6–12)
PMV BLD AUTO: 10.1 FL (ref 6–12)
PMV BLD AUTO: 10.2 FL (ref 6–12)
PMV BLD AUTO: 10.3 FL (ref 6–12)
PMV BLD AUTO: 10.3 FL (ref 6–12)
PMV BLD AUTO: 10.4 FL (ref 6–12)
PMV BLD AUTO: 10.5 FL (ref 6–12)
PMV BLD AUTO: 10.7 FL (ref 6–12)
PMV BLD AUTO: 10.9 FL (ref 6–12)
PMV BLD AUTO: 8.9 FL (ref 6–12)
PMV BLD AUTO: 8.9 FL (ref 6–12)
PMV BLD AUTO: 9.1 FL (ref 6–12)
PMV BLD AUTO: 9.2 FL (ref 6–12)
PMV BLD AUTO: 9.3 FL (ref 6–12)
PMV BLD AUTO: 9.4 FL (ref 6–12)
PMV BLD AUTO: 9.5 FL (ref 6–12)
PMV BLD AUTO: 9.5 FL (ref 6–12)
PMV BLD AUTO: 9.6 FL (ref 6–12)
PMV BLD AUTO: 9.7 FL (ref 6–12)
PMV BLD AUTO: 9.7 FL (ref 6–12)
PMV BLD AUTO: 9.8 FL (ref 6–12)
PMV BLD AUTO: 9.9 FL (ref 6–12)
PO2 BLDA: 113 MM HG (ref 83–108)
PO2 BLDA: 50.9 MM HG (ref 83–108)
PO2 BLDA: 52.5 MM HG (ref 83–108)
PO2 BLDA: 57.5 MM HG (ref 83–108)
PO2 BLDA: 57.8 MM HG (ref 83–108)
PO2 BLDA: 62.1 MM HG (ref 83–108)
PO2 BLDA: 66.3 MM HG (ref 83–108)
PO2 BLDA: 69.2 MM HG (ref 83–108)
PO2 BLDA: 71.6 MM HG (ref 83–108)
PO2 BLDA: 71.8 MM HG (ref 83–108)
PO2 BLDA: 74.6 MM HG (ref 83–108)
PO2 BLDA: 74.9 MM HG (ref 83–108)
PO2 BLDA: 77.1 MM HG (ref 83–108)
PO2 BLDA: 79.5 MM HG (ref 83–108)
PO2 BLDA: 97.2 MM HG (ref 83–108)
PO2 TEMP ADJ BLD: 113 MM HG (ref 83–108)
PO2 TEMP ADJ BLD: 50.9 MM HG (ref 83–108)
PO2 TEMP ADJ BLD: 52.5 MM HG (ref 83–108)
PO2 TEMP ADJ BLD: 57.5 MM HG (ref 83–108)
PO2 TEMP ADJ BLD: 57.8 MM HG (ref 83–108)
PO2 TEMP ADJ BLD: 62.1 MM HG (ref 83–108)
PO2 TEMP ADJ BLD: 66.3 MM HG (ref 83–108)
PO2 TEMP ADJ BLD: 69.2 MM HG (ref 83–108)
PO2 TEMP ADJ BLD: 71.6 MM HG (ref 83–108)
PO2 TEMP ADJ BLD: 71.8 MM HG (ref 83–108)
PO2 TEMP ADJ BLD: 74.6 MM HG (ref 83–108)
PO2 TEMP ADJ BLD: 74.9 MM HG (ref 83–108)
PO2 TEMP ADJ BLD: 77.1 MM HG (ref 83–108)
PO2 TEMP ADJ BLD: 79.5 MM HG (ref 83–108)
PO2 TEMP ADJ BLD: 97.2 MM HG (ref 83–108)
POLYCHROMASIA BLD QL SMEAR: ABNORMAL
POLYCHROMASIA BLD QL SMEAR: ABNORMAL
POSITIVE CONTROL: POSITIVE
POTASSIUM SERPL-SCNC: 3.8 MMOL/L (ref 3.5–5.2)
POTASSIUM SERPL-SCNC: 3.9 MMOL/L (ref 3.5–5.2)
POTASSIUM SERPL-SCNC: 4.1 MMOL/L (ref 3.5–5.2)
POTASSIUM SERPL-SCNC: 4.2 MMOL/L (ref 3.5–5.2)
POTASSIUM SERPL-SCNC: 4.3 MMOL/L (ref 3.5–5.2)
POTASSIUM SERPL-SCNC: 4.3 MMOL/L (ref 3.5–5.2)
POTASSIUM SERPL-SCNC: 4.4 MMOL/L (ref 3.5–5.2)
POTASSIUM SERPL-SCNC: 4.5 MMOL/L (ref 3.5–5.2)
POTASSIUM SERPL-SCNC: 4.6 MMOL/L (ref 3.5–5.2)
POTASSIUM SERPL-SCNC: 4.6 MMOL/L (ref 3.5–5.2)
POTASSIUM SERPL-SCNC: 4.7 MMOL/L (ref 3.5–5.2)
POTASSIUM SERPL-SCNC: 4.9 MMOL/L (ref 3.5–5.2)
POTASSIUM SERPL-SCNC: 5 MMOL/L (ref 3.5–5.2)
POTASSIUM SERPL-SCNC: 5.1 MMOL/L (ref 3.5–5.2)
POTASSIUM SERPL-SCNC: 5.1 MMOL/L (ref 3.5–5.2)
POTASSIUM SERPL-SCNC: 5.3 MMOL/L (ref 3.5–5.2)
POTASSIUM SERPL-SCNC: 5.4 MMOL/L (ref 3.5–5.2)
POTASSIUM SERPL-SCNC: 5.7 MMOL/L (ref 3.5–5.2)
POTASSIUM SERPL-SCNC: 5.8 MMOL/L (ref 3.5–5.2)
POTASSIUM SERPL-SCNC: 6 MMOL/L (ref 3.5–5.2)
POTASSIUM SERPL-SCNC: 6 MMOL/L (ref 3.5–5.2)
POTASSIUM SERPL-SCNC: 6.6 MMOL/L (ref 3.5–5.2)
PREALB SERPL-MCNC: 10.6 MG/DL (ref 20–40)
PREALB SERPL-MCNC: 22 MG/DL (ref 20–40)
PREALB SERPL-MCNC: 28.9 MG/DL (ref 20–40)
PREALB SERPL-MCNC: 7 MG/DL (ref 20–40)
PROCALCITONIN SERPL-MCNC: 0.13 NG/ML (ref 0–0.25)
PROCALCITONIN SERPL-MCNC: 0.14 NG/ML (ref 0–0.25)
PROCALCITONIN SERPL-MCNC: 1.48 NG/ML (ref 0–0.25)
PROMYELOCYTES NFR BLD MANUAL: 1 % (ref 0–0)
PROT SERPL-MCNC: 4.3 G/DL (ref 6–8.5)
PROT SERPL-MCNC: 4.4 G/DL (ref 6–8.5)
PROT SERPL-MCNC: 4.5 G/DL (ref 6–8.5)
PROT SERPL-MCNC: 4.7 G/DL (ref 6–8.5)
PROT SERPL-MCNC: 4.8 G/DL (ref 6–8.5)
PROT SERPL-MCNC: 5 G/DL (ref 6–8.5)
PROT SERPL-MCNC: 5.1 G/DL (ref 6–8.5)
PROT SERPL-MCNC: 5.2 G/DL (ref 6–8.5)
PROT SERPL-MCNC: 5.3 G/DL (ref 6–8.5)
PROT SERPL-MCNC: 5.4 G/DL (ref 6–8.5)
PROT SERPL-MCNC: 5.5 G/DL (ref 6–8.5)
PROT SERPL-MCNC: 5.6 G/DL (ref 6–8.5)
PROT SERPL-MCNC: 5.7 G/DL (ref 6–8.5)
PROT SERPL-MCNC: 5.8 G/DL (ref 6–8.5)
PROT SERPL-MCNC: 5.9 G/DL (ref 6–8.5)
PROT SERPL-MCNC: 6.1 G/DL (ref 6–8.5)
PROT SERPL-MCNC: 6.1 G/DL (ref 6–8.5)
PROT SERPL-MCNC: 6.2 G/DL (ref 6–8.5)
PROT SERPL-MCNC: 6.4 G/DL (ref 6–8.5)
PROT SERPL-MCNC: 6.5 G/DL (ref 6–8.5)
PROT UR QL STRIP: ABNORMAL
PROT UR QL STRIP: ABNORMAL
PROT UR QL STRIP: NEGATIVE
PROT UR QL STRIP: NEGATIVE
PROTHROMBIN TIME: 15.5 SECONDS (ref 11.5–14)
PROTHROMBIN TIME: 16.1 SECONDS (ref 11.5–14)
QT INTERVAL: 340 MS
QT INTERVAL: 352 MS
QTC INTERVAL: 404 MS
QTC INTERVAL: 413 MS
RBC # BLD AUTO: 2.42 10*6/MM3 (ref 3.77–5.28)
RBC # BLD AUTO: 2.58 10*6/MM3 (ref 3.77–5.28)
RBC # BLD AUTO: 2.58 10*6/MM3 (ref 3.77–5.28)
RBC # BLD AUTO: 2.6 10*6/MM3 (ref 3.77–5.28)
RBC # BLD AUTO: 2.64 10*6/MM3 (ref 3.77–5.28)
RBC # BLD AUTO: 2.66 10*6/MM3 (ref 3.77–5.28)
RBC # BLD AUTO: 2.72 10*6/MM3 (ref 3.77–5.28)
RBC # BLD AUTO: 2.75 10*6/MM3 (ref 3.77–5.28)
RBC # BLD AUTO: 2.76 10*6/MM3 (ref 3.77–5.28)
RBC # BLD AUTO: 2.82 10*6/MM3 (ref 3.77–5.28)
RBC # BLD AUTO: 2.84 10*6/MM3 (ref 3.77–5.28)
RBC # BLD AUTO: 2.91 10*6/MM3 (ref 3.77–5.28)
RBC # BLD AUTO: 2.95 10*6/MM3 (ref 3.77–5.28)
RBC # BLD AUTO: 2.97 10*6/MM3 (ref 3.77–5.28)
RBC # BLD AUTO: 2.98 10*6/MM3 (ref 3.77–5.28)
RBC # BLD AUTO: 3 10*6/MM3 (ref 3.77–5.28)
RBC # BLD AUTO: 3.18 10*6/MM3 (ref 3.77–5.28)
RBC # BLD AUTO: 3.2 10*6/MM3 (ref 3.77–5.28)
RBC # BLD AUTO: 3.27 10*6/MM3 (ref 3.77–5.28)
RBC # BLD AUTO: 3.42 10*6/MM3 (ref 3.77–5.28)
RBC # BLD AUTO: 3.42 10*6/MM3 (ref 3.77–5.28)
RBC # BLD AUTO: 3.43 10*6/MM3 (ref 3.77–5.28)
RBC # BLD AUTO: 3.43 10*6/MM3 (ref 3.77–5.28)
RBC # BLD AUTO: 3.61 10*6/MM3 (ref 3.77–5.28)
RBC # BLD AUTO: 3.63 10*6/MM3 (ref 3.77–5.28)
RBC # BLD AUTO: 3.67 10*6/MM3 (ref 3.77–5.28)
RBC # BLD AUTO: 3.69 10*6/MM3 (ref 3.77–5.28)
RBC # BLD AUTO: 3.87 10*6/MM3 (ref 3.77–5.28)
RBC # BLD AUTO: 3.88 10*6/MM3 (ref 3.77–5.28)
RBC # BLD AUTO: 3.9 10*6/MM3 (ref 3.77–5.28)
RBC # BLD AUTO: 3.97 10*6/MM3 (ref 3.77–5.28)
RBC # BLD AUTO: 4.1 10*6/MM3 (ref 3.77–5.28)
RBC # BLD AUTO: 4.12 10*6/MM3 (ref 3.77–5.28)
RBC # BLD AUTO: 4.13 10*6/MM3 (ref 3.77–5.28)
RBC # BLD AUTO: 4.21 10*6/MM3 (ref 3.77–5.28)
RBC # BLD AUTO: 4.3 10*6/MM3 (ref 3.77–5.28)
RBC # BLD AUTO: 4.37 10*6/MM3 (ref 3.77–5.28)
RBC # UR: ABNORMAL /HPF
RBC MORPH BLD: NORMAL
REF LAB TEST METHOD: ABNORMAL
RH BLD: POSITIVE
RHINOVIRUS RNA SPEC NAA+PROBE: NOT DETECTED
RSV RNA NPH QL NAA+NON-PROBE: NOT DETECTED
SARS-COV-2 RNA NPH QL NAA+NON-PROBE: DETECTED
SET MECH RESP RATE: 12
SET MECH RESP RATE: 20
SMUDGE CELLS BLD QL SMEAR: ABNORMAL
SODIUM SERPL-SCNC: 127 MMOL/L (ref 136–145)
SODIUM SERPL-SCNC: 129 MMOL/L (ref 136–145)
SODIUM SERPL-SCNC: 129 MMOL/L (ref 136–145)
SODIUM SERPL-SCNC: 130 MMOL/L (ref 136–145)
SODIUM SERPL-SCNC: 132 MMOL/L (ref 136–145)
SODIUM SERPL-SCNC: 133 MMOL/L (ref 136–145)
SODIUM SERPL-SCNC: 135 MMOL/L (ref 136–145)
SODIUM SERPL-SCNC: 135 MMOL/L (ref 136–145)
SODIUM SERPL-SCNC: 136 MMOL/L (ref 136–145)
SODIUM SERPL-SCNC: 137 MMOL/L (ref 136–145)
SODIUM SERPL-SCNC: 138 MMOL/L (ref 136–145)
SODIUM SERPL-SCNC: 139 MMOL/L (ref 136–145)
SODIUM SERPL-SCNC: 140 MMOL/L (ref 136–145)
SODIUM SERPL-SCNC: 141 MMOL/L (ref 136–145)
SODIUM SERPL-SCNC: 141 MMOL/L (ref 136–145)
SODIUM SERPL-SCNC: 142 MMOL/L (ref 136–145)
SODIUM SERPL-SCNC: 143 MMOL/L (ref 136–145)
SODIUM SERPL-SCNC: 144 MMOL/L (ref 136–145)
SODIUM SERPL-SCNC: 146 MMOL/L (ref 136–145)
SODIUM SERPL-SCNC: 147 MMOL/L (ref 136–145)
SODIUM SERPL-SCNC: 147 MMOL/L (ref 136–145)
SODIUM SERPL-SCNC: 149 MMOL/L (ref 136–145)
SODIUM UR-SCNC: 20 MMOL/L
SP GR UR STRIP: 1.01 (ref 1–1.03)
SP GR UR STRIP: 1.02 (ref 1–1.03)
SQUAMOUS #/AREA URNS HPF: ABNORMAL /HPF
T&S EXPIRATION DATE: NORMAL
T&S EXPIRATION DATE: NORMAL
T4 FREE SERPL-MCNC: 1.49 NG/DL (ref 0.93–1.7)
THEOPHYLLINE SERPL-MCNC: 11.4 MCG/ML (ref 10–20)
THEOPHYLLINE SERPL-MCNC: 18.6 MCG/ML (ref 10–20)
TOTAL RATE: 0 BREATHS/MINUTE
TOTAL RATE: 53 BREATHS/MINUTE
TOXIC GRANULATION: ABNORMAL
TOXIC GRANULATION: ABNORMAL
TRIGL SERPL-MCNC: 104 MG/DL (ref 0–150)
TRIGL SERPL-MCNC: 120 MG/DL (ref 0–150)
TRIGL SERPL-MCNC: 234 MG/DL (ref 0–150)
TRIGL SERPL-MCNC: 46 MG/DL (ref 0–150)
TRIGL SERPL-MCNC: 89 MG/DL (ref 0–150)
TROPONIN T SERPL-MCNC: 0.07 NG/ML (ref 0–0.03)
TROPONIN T SERPL-MCNC: 0.08 NG/ML (ref 0–0.03)
TROPONIN T SERPL-MCNC: 0.1 NG/ML (ref 0–0.03)
TROPONIN T SERPL-MCNC: <0.01 NG/ML (ref 0–0.03)
TSH SERPL DL<=0.05 MIU/L-ACNC: 0.5 UIU/ML (ref 0.27–4.2)
TSH SERPL DL<=0.05 MIU/L-ACNC: 1.27 UIU/ML (ref 0.27–4.2)
UNIDENT CRYS URNS QL MICRO: ABNORMAL /HPF
UNIT  ABO: NORMAL
UNIT  RH: NORMAL
UROBILINOGEN UR QL STRIP: ABNORMAL
UROBILINOGEN UR QL STRIP: NORMAL
VANCOMYCIN SERPL-MCNC: 12.3 MCG/ML (ref 5–40)
VANCOMYCIN TROUGH SERPL-MCNC: 17.1 MCG/ML (ref 5–20)
VANCOMYCIN TROUGH SERPL-MCNC: 25.1 MCG/ML (ref 5–20)
VARIANT LYMPHS NFR BLD MANUAL: 4 % (ref 0–5)
VENTILATOR MODE: ABNORMAL
VIT B12 BLD-MCNC: 1378 PG/ML (ref 211–946)
VLDLC SERPL-MCNC: 38 MG/DL (ref 5–40)
VT ON VENT VENT: 0.33 ML
VT ON VENT VENT: 325 ML
WBC # BLD AUTO: 10.35 10*3/MM3 (ref 3.4–10.8)
WBC # BLD AUTO: 11.48 10*3/MM3 (ref 3.4–10.8)
WBC # BLD AUTO: 11.81 10*3/MM3 (ref 3.4–10.8)
WBC # BLD AUTO: 11.82 10*3/MM3 (ref 3.4–10.8)
WBC # BLD AUTO: 11.98 10*3/MM3 (ref 3.4–10.8)
WBC # BLD AUTO: 13.61 10*3/MM3 (ref 3.4–10.8)
WBC # BLD AUTO: 14.2 10*3/MM3 (ref 3.4–10.8)
WBC # BLD AUTO: 14.41 10*3/MM3 (ref 3.4–10.8)
WBC # BLD AUTO: 15.14 10*3/MM3 (ref 3.4–10.8)
WBC # BLD AUTO: 16.7 10*3/MM3 (ref 3.4–10.8)
WBC # BLD AUTO: 17.09 10*3/MM3 (ref 3.4–10.8)
WBC # BLD AUTO: 17.34 10*3/MM3 (ref 3.4–10.8)
WBC # BLD AUTO: 17.52 10*3/MM3 (ref 3.4–10.8)
WBC # BLD AUTO: 18.39 10*3/MM3 (ref 3.4–10.8)
WBC # BLD AUTO: 18.69 10*3/MM3 (ref 3.4–10.8)
WBC # BLD AUTO: 19.81 10*3/MM3 (ref 3.4–10.8)
WBC # BLD AUTO: 2.49 10*3/MM3 (ref 3.4–10.8)
WBC # BLD AUTO: 21.28 10*3/MM3 (ref 3.4–10.8)
WBC # BLD AUTO: 25.83 10*3/MM3 (ref 3.4–10.8)
WBC # BLD AUTO: 5.28 10*3/MM3 (ref 3.4–10.8)
WBC # BLD AUTO: 5.39 10*3/MM3 (ref 3.4–10.8)
WBC # BLD AUTO: 5.67 10*3/MM3 (ref 3.4–10.8)
WBC # BLD AUTO: 5.81 10*3/MM3 (ref 3.4–10.8)
WBC # BLD AUTO: 7.51 10*3/MM3 (ref 3.4–10.8)
WBC # BLD AUTO: 7.57 10*3/MM3 (ref 3.4–10.8)
WBC # BLD AUTO: 7.79 10*3/MM3 (ref 3.4–10.8)
WBC # BLD AUTO: 7.92 10*3/MM3 (ref 3.4–10.8)
WBC # BLD AUTO: 7.94 10*3/MM3 (ref 3.4–10.8)
WBC # BLD AUTO: 8.19 10*3/MM3 (ref 3.4–10.8)
WBC # BLD AUTO: 8.21 10*3/MM3 (ref 3.4–10.8)
WBC # BLD AUTO: 8.29 10*3/MM3 (ref 3.4–10.8)
WBC # BLD AUTO: 8.82 10*3/MM3 (ref 3.4–10.8)
WBC # BLD AUTO: 8.9 10*3/MM3 (ref 3.4–10.8)
WBC # BLD AUTO: 9.27 10*3/MM3 (ref 3.4–10.8)
WBC # BLD AUTO: 9.64 10*3/MM3 (ref 3.4–10.8)
WBC # BLD AUTO: 9.76 10*3/MM3 (ref 3.4–10.8)
WBC # BLD AUTO: 9.86 10*3/MM3 (ref 3.4–10.8)
WBC MORPH BLD: NORMAL
WBC UR QL AUTO: ABNORMAL /HPF
WHOLE BLOOD HOLD SPECIMEN: NORMAL
WHOLE BLOOD HOLD SPECIMEN: NORMAL

## 2021-01-01 PROCEDURE — 94799 UNLISTED PULMONARY SVC/PX: CPT

## 2021-01-01 PROCEDURE — 82550 ASSAY OF CK (CPK): CPT | Performed by: NURSE PRACTITIONER

## 2021-01-01 PROCEDURE — 85007 BL SMEAR W/DIFF WBC COUNT: CPT | Performed by: NURSE PRACTITIONER

## 2021-01-01 PROCEDURE — 25010000002 LEVETIRACETAM IN NACL 0.82% 500 MG/100ML SOLUTION: Performed by: PSYCHIATRY & NEUROLOGY

## 2021-01-01 PROCEDURE — 63710000001 INSULIN REGULAR HUMAN PER 5 UNITS: Performed by: INTERNAL MEDICINE

## 2021-01-01 PROCEDURE — 25010000002 METHYLPREDNISOLONE PER 125 MG: Performed by: INTERNAL MEDICINE

## 2021-01-01 PROCEDURE — 82728 ASSAY OF FERRITIN: CPT | Performed by: SURGERY

## 2021-01-01 PROCEDURE — 99231 SBSQ HOSP IP/OBS SF/LOW 25: CPT | Performed by: PSYCHIATRY & NEUROLOGY

## 2021-01-01 PROCEDURE — 80053 COMPREHEN METABOLIC PANEL: CPT | Performed by: SURGERY

## 2021-01-01 PROCEDURE — 25010000002 CEFTAZIDIME PER 500 MG: Performed by: INTERNAL MEDICINE

## 2021-01-01 PROCEDURE — 83735 ASSAY OF MAGNESIUM: CPT | Performed by: INTERNAL MEDICINE

## 2021-01-01 PROCEDURE — 84132 ASSAY OF SERUM POTASSIUM: CPT | Performed by: INTERNAL MEDICINE

## 2021-01-01 PROCEDURE — 99285 EMERGENCY DEPT VISIT HI MDM: CPT

## 2021-01-01 PROCEDURE — 85379 FIBRIN DEGRADATION QUANT: CPT | Performed by: NURSE PRACTITIONER

## 2021-01-01 PROCEDURE — 82375 ASSAY CARBOXYHB QUANT: CPT

## 2021-01-01 PROCEDURE — 85384 FIBRINOGEN ACTIVITY: CPT | Performed by: NURSE PRACTITIONER

## 2021-01-01 PROCEDURE — 87205 SMEAR GRAM STAIN: CPT | Performed by: INTERNAL MEDICINE

## 2021-01-01 PROCEDURE — 71045 X-RAY EXAM CHEST 1 VIEW: CPT

## 2021-01-01 PROCEDURE — 25010000002 CEFEPIME PER 500 MG: Performed by: INTERNAL MEDICINE

## 2021-01-01 PROCEDURE — 63710000001 INSULIN REGULAR HUMAN PER 5 UNITS: Performed by: SURGERY

## 2021-01-01 PROCEDURE — 94002 VENT MGMT INPAT INIT DAY: CPT

## 2021-01-01 PROCEDURE — 99233 SBSQ HOSP IP/OBS HIGH 50: CPT | Performed by: INTERNAL MEDICINE

## 2021-01-01 PROCEDURE — 25010000002 PROPOFOL 10 MG/ML EMULSION: Performed by: INTERNAL MEDICINE

## 2021-01-01 PROCEDURE — 84145 PROCALCITONIN (PCT): CPT | Performed by: EMERGENCY MEDICINE

## 2021-01-01 PROCEDURE — 85384 FIBRINOGEN ACTIVITY: CPT | Performed by: SURGERY

## 2021-01-01 PROCEDURE — 25010000002 LORAZEPAM PER 2 MG: Performed by: NURSE PRACTITIONER

## 2021-01-01 PROCEDURE — 25010000002 LORAZEPAM PER 2 MG: Performed by: PSYCHIATRY & NEUROLOGY

## 2021-01-01 PROCEDURE — 94003 VENT MGMT INPAT SUBQ DAY: CPT

## 2021-01-01 PROCEDURE — 86140 C-REACTIVE PROTEIN: CPT | Performed by: NURSE PRACTITIONER

## 2021-01-01 PROCEDURE — 25010000002 LORAZEPAM PER 2 MG: Performed by: INTERNAL MEDICINE

## 2021-01-01 PROCEDURE — 25010000002 VANCOMYCIN PER 500 MG: Performed by: INTERNAL MEDICINE

## 2021-01-01 PROCEDURE — 25010000002 ENOXAPARIN PER 10 MG: Performed by: INTERNAL MEDICINE

## 2021-01-01 PROCEDURE — 80053 COMPREHEN METABOLIC PANEL: CPT | Performed by: NURSE PRACTITIONER

## 2021-01-01 PROCEDURE — 82550 ASSAY OF CK (CPK): CPT | Performed by: SURGERY

## 2021-01-01 PROCEDURE — 82962 GLUCOSE BLOOD TEST: CPT

## 2021-01-01 PROCEDURE — 63710000001 INSULIN DETEMIR PER 5 UNITS: Performed by: NURSE PRACTITIONER

## 2021-01-01 PROCEDURE — 86900 BLOOD TYPING SEROLOGIC ABO: CPT | Performed by: NURSE PRACTITIONER

## 2021-01-01 PROCEDURE — 25010000002 MIDAZOLAM PER 1 MG: Performed by: NURSE PRACTITIONER

## 2021-01-01 PROCEDURE — 63710000001 PREDNISONE PER 5 MG: Performed by: INTERNAL MEDICINE

## 2021-01-01 PROCEDURE — 83605 ASSAY OF LACTIC ACID: CPT | Performed by: NURSE PRACTITIONER

## 2021-01-01 PROCEDURE — 63710000001 INSULIN REGULAR HUMAN PER 5 UNITS: Performed by: NURSE PRACTITIONER

## 2021-01-01 PROCEDURE — 25010000002 FUROSEMIDE PER 20 MG: Performed by: NURSE PRACTITIONER

## 2021-01-01 PROCEDURE — 87186 SC STD MICRODIL/AGAR DIL: CPT | Performed by: NURSE PRACTITIONER

## 2021-01-01 PROCEDURE — 84100 ASSAY OF PHOSPHORUS: CPT | Performed by: NURSE PRACTITIONER

## 2021-01-01 PROCEDURE — 83615 LACTATE (LD) (LDH) ENZYME: CPT | Performed by: SURGERY

## 2021-01-01 PROCEDURE — 25010000002 MICAFUNGIN SODIUM 100 MG RECONSTITUTED SOLUTION: Performed by: INTERNAL MEDICINE

## 2021-01-01 PROCEDURE — 81001 URINALYSIS AUTO W/SCOPE: CPT | Performed by: NURSE PRACTITIONER

## 2021-01-01 PROCEDURE — 25010000002 LEVOFLOXACIN PER 250 MG: Performed by: INTERNAL MEDICINE

## 2021-01-01 PROCEDURE — 99291 CRITICAL CARE FIRST HOUR: CPT | Performed by: INTERNAL MEDICINE

## 2021-01-01 PROCEDURE — 0 IOPAMIDOL PER 1 ML: Performed by: INTERNAL MEDICINE

## 2021-01-01 PROCEDURE — 85610 PROTHROMBIN TIME: CPT | Performed by: INTERNAL MEDICINE

## 2021-01-01 PROCEDURE — 83050 HGB METHEMOGLOBIN QUAN: CPT

## 2021-01-01 PROCEDURE — 84100 ASSAY OF PHOSPHORUS: CPT | Performed by: SURGERY

## 2021-01-01 PROCEDURE — 31500 INSERT EMERGENCY AIRWAY: CPT | Performed by: INTERNAL MEDICINE

## 2021-01-01 PROCEDURE — 82805 BLOOD GASES W/O2 SATURATION: CPT

## 2021-01-01 PROCEDURE — 82728 ASSAY OF FERRITIN: CPT | Performed by: NURSE PRACTITIONER

## 2021-01-01 PROCEDURE — 99232 SBSQ HOSP IP/OBS MODERATE 35: CPT | Performed by: PSYCHIATRY & NEUROLOGY

## 2021-01-01 PROCEDURE — 83880 ASSAY OF NATRIURETIC PEPTIDE: CPT | Performed by: NURSE PRACTITIONER

## 2021-01-01 PROCEDURE — 84478 ASSAY OF TRIGLYCERIDES: CPT | Performed by: INTERNAL MEDICINE

## 2021-01-01 PROCEDURE — 82270 OCCULT BLOOD FECES: CPT | Performed by: EMERGENCY MEDICINE

## 2021-01-01 PROCEDURE — 84100 ASSAY OF PHOSPHORUS: CPT | Performed by: INTERNAL MEDICINE

## 2021-01-01 PROCEDURE — 84300 ASSAY OF URINE SODIUM: CPT | Performed by: INTERNAL MEDICINE

## 2021-01-01 PROCEDURE — 25010000003 MAGNESIUM SULFATE 4 GM/100ML SOLUTION: Performed by: NURSE PRACTITIONER

## 2021-01-01 PROCEDURE — 36600 WITHDRAWAL OF ARTERIAL BLOOD: CPT

## 2021-01-01 PROCEDURE — 25010000002 FUROSEMIDE PER 20 MG

## 2021-01-01 PROCEDURE — 87070 CULTURE OTHR SPECIMN AEROBIC: CPT | Performed by: INTERNAL MEDICINE

## 2021-01-01 PROCEDURE — 63710000001 PREDNISONE PER 1 MG: Performed by: INTERNAL MEDICINE

## 2021-01-01 PROCEDURE — 84145 PROCALCITONIN (PCT): CPT | Performed by: NURSE PRACTITIONER

## 2021-01-01 PROCEDURE — 25010000002 FENTANYL CITRATE (PF) 2500 MCG/50ML SOLUTION: Performed by: INTERNAL MEDICINE

## 2021-01-01 PROCEDURE — P9041 ALBUMIN (HUMAN),5%, 50ML: HCPCS | Performed by: INTERNAL MEDICINE

## 2021-01-01 PROCEDURE — 87255 GENET VIRUS ISOLATE HSV: CPT | Performed by: NURSE PRACTITIONER

## 2021-01-01 PROCEDURE — 85025 COMPLETE CBC W/AUTO DIFF WBC: CPT | Performed by: NURSE PRACTITIONER

## 2021-01-01 PROCEDURE — 87493 C DIFF AMPLIFIED PROBE: CPT | Performed by: INTERNAL MEDICINE

## 2021-01-01 PROCEDURE — 85027 COMPLETE CBC AUTOMATED: CPT | Performed by: INTERNAL MEDICINE

## 2021-01-01 PROCEDURE — 86901 BLOOD TYPING SEROLOGIC RH(D): CPT

## 2021-01-01 PROCEDURE — 25010000002 DEXAMETHASONE PER 1 MG: Performed by: FAMILY MEDICINE

## 2021-01-01 PROCEDURE — 25010000002 HYDROMORPHONE HCL-NACL 30-0.9 MG/30ML-% SOLUTION PREFILLED SYRINGE: Performed by: INTERNAL MEDICINE

## 2021-01-01 PROCEDURE — 80053 COMPREHEN METABOLIC PANEL: CPT | Performed by: EMERGENCY MEDICINE

## 2021-01-01 PROCEDURE — 86140 C-REACTIVE PROTEIN: CPT | Performed by: SURGERY

## 2021-01-01 PROCEDURE — 25010000002 FENTANYL 10 MCG/1 ML NS: Performed by: INTERNAL MEDICINE

## 2021-01-01 PROCEDURE — 85025 COMPLETE CBC W/AUTO DIFF WBC: CPT | Performed by: SURGERY

## 2021-01-01 PROCEDURE — 93306 TTE W/DOPPLER COMPLETE: CPT | Performed by: INTERNAL MEDICINE

## 2021-01-01 PROCEDURE — P9047 ALBUMIN (HUMAN), 25%, 50ML: HCPCS | Performed by: INTERNAL MEDICINE

## 2021-01-01 PROCEDURE — 0BH17EZ INSERTION OF ENDOTRACHEAL AIRWAY INTO TRACHEA, VIA NATURAL OR ARTIFICIAL OPENING: ICD-10-PCS | Performed by: INTERNAL MEDICINE

## 2021-01-01 PROCEDURE — 83615 LACTATE (LD) (LDH) ENZYME: CPT | Performed by: NURSE PRACTITIONER

## 2021-01-01 PROCEDURE — 63710000001 DEXAMETHASONE PER 0.25 MG: Performed by: NURSE PRACTITIONER

## 2021-01-01 PROCEDURE — 63710000001 INSULIN DETEMIR PER 5 UNITS: Performed by: INTERNAL MEDICINE

## 2021-01-01 PROCEDURE — 99223 1ST HOSP IP/OBS HIGH 75: CPT | Performed by: PSYCHIATRY & NEUROLOGY

## 2021-01-01 PROCEDURE — 84134 ASSAY OF PREALBUMIN: CPT | Performed by: INTERNAL MEDICINE

## 2021-01-01 PROCEDURE — 25010000002 VANCOMYCIN 10 G RECONSTITUTED SOLUTION

## 2021-01-01 PROCEDURE — 25010000002 ENOXAPARIN PER 10 MG

## 2021-01-01 PROCEDURE — 70496 CT ANGIOGRAPHY HEAD: CPT

## 2021-01-01 PROCEDURE — 81001 URINALYSIS AUTO W/SCOPE: CPT | Performed by: INTERNAL MEDICINE

## 2021-01-01 PROCEDURE — 80053 COMPREHEN METABOLIC PANEL: CPT | Performed by: INTERNAL MEDICINE

## 2021-01-01 PROCEDURE — P9041 ALBUMIN (HUMAN),5%, 50ML: HCPCS | Performed by: NURSE PRACTITIONER

## 2021-01-01 PROCEDURE — 25010000002 DEXAMETHASONE SODIUM PHOSPHATE 100 MG/10ML SOLUTION: Performed by: PHYSICIAN ASSISTANT

## 2021-01-01 PROCEDURE — 0DH63UZ INSERTION OF FEEDING DEVICE INTO STOMACH, PERCUTANEOUS APPROACH: ICD-10-PCS | Performed by: SURGERY

## 2021-01-01 PROCEDURE — 0WP9X0Z REMOVAL OF DRAINAGE DEVICE FROM RIGHT PLEURAL CAVITY, EXTERNAL APPROACH: ICD-10-PCS | Performed by: INTERNAL MEDICINE

## 2021-01-01 PROCEDURE — 86850 RBC ANTIBODY SCREEN: CPT | Performed by: NURSE PRACTITIONER

## 2021-01-01 PROCEDURE — 82465 ASSAY BLD/SERUM CHOLESTEROL: CPT | Performed by: SURGERY

## 2021-01-01 PROCEDURE — 85379 FIBRIN DEGRADATION QUANT: CPT | Performed by: SURGERY

## 2021-01-01 PROCEDURE — 82607 VITAMIN B-12: CPT | Performed by: PSYCHIATRY & NEUROLOGY

## 2021-01-01 PROCEDURE — XW033H5 INTRODUCTION OF TOCILIZUMAB INTO PERIPHERAL VEIN, PERCUTANEOUS APPROACH, NEW TECHNOLOGY GROUP 5: ICD-10-PCS | Performed by: INTERNAL MEDICINE

## 2021-01-01 PROCEDURE — 83735 ASSAY OF MAGNESIUM: CPT | Performed by: SURGERY

## 2021-01-01 PROCEDURE — 25010000002 ALBUMIN HUMAN 25% PER 50 ML: Performed by: NURSE PRACTITIONER

## 2021-01-01 PROCEDURE — 74018 RADEX ABDOMEN 1 VIEW: CPT

## 2021-01-01 PROCEDURE — 25010000002 ALBUMIN HUMAN 5% PER 50 ML: Performed by: NURSE PRACTITIONER

## 2021-01-01 PROCEDURE — 87641 MR-STAPH DNA AMP PROBE: CPT | Performed by: NURSE PRACTITIONER

## 2021-01-01 PROCEDURE — 5A1955Z RESPIRATORY VENTILATION, GREATER THAN 96 CONSECUTIVE HOURS: ICD-10-PCS | Performed by: INTERNAL MEDICINE

## 2021-01-01 PROCEDURE — 87205 SMEAR GRAM STAIN: CPT | Performed by: NURSE PRACTITIONER

## 2021-01-01 PROCEDURE — 87077 CULTURE AEROBIC IDENTIFY: CPT | Performed by: INTERNAL MEDICINE

## 2021-01-01 PROCEDURE — 83735 ASSAY OF MAGNESIUM: CPT | Performed by: NURSE PRACTITIONER

## 2021-01-01 PROCEDURE — 94640 AIRWAY INHALATION TREATMENT: CPT

## 2021-01-01 PROCEDURE — 86140 C-REACTIVE PROTEIN: CPT | Performed by: EMERGENCY MEDICINE

## 2021-01-01 PROCEDURE — 25010000002 METHYLPREDNISOLONE PER 40 MG: Performed by: INTERNAL MEDICINE

## 2021-01-01 PROCEDURE — 25010000002 ALBUMIN HUMAN 25% PER 50 ML: Performed by: INTERNAL MEDICINE

## 2021-01-01 PROCEDURE — 25010000002 TOCILIZUMAB 400 MG/20ML SOLUTION 20 ML VIAL: Performed by: INTERNAL MEDICINE

## 2021-01-01 PROCEDURE — 63710000001 INSULIN DETEMIR PER 5 UNITS: Performed by: SURGERY

## 2021-01-01 PROCEDURE — 85730 THROMBOPLASTIN TIME PARTIAL: CPT | Performed by: NURSE PRACTITIONER

## 2021-01-01 PROCEDURE — 95816 EEG AWAKE AND DROWSY: CPT

## 2021-01-01 PROCEDURE — 87186 SC STD MICRODIL/AGAR DIL: CPT | Performed by: INTERNAL MEDICINE

## 2021-01-01 PROCEDURE — 25010000002 FUROSEMIDE PER 20 MG: Performed by: PHYSICIAN ASSISTANT

## 2021-01-01 PROCEDURE — 25010000002 PIPERACILLIN SOD-TAZOBACTAM PER 1 G: Performed by: INTERNAL MEDICINE

## 2021-01-01 PROCEDURE — 25010000002 DIAZEPAM PER 5 MG: Performed by: NURSE PRACTITIONER

## 2021-01-01 PROCEDURE — 84484 ASSAY OF TROPONIN QUANT: CPT | Performed by: NURSE PRACTITIONER

## 2021-01-01 PROCEDURE — 85014 HEMATOCRIT: CPT | Performed by: INTERNAL MEDICINE

## 2021-01-01 PROCEDURE — 82140 ASSAY OF AMMONIA: CPT | Performed by: PSYCHIATRY & NEUROLOGY

## 2021-01-01 PROCEDURE — 84134 ASSAY OF PREALBUMIN: CPT | Performed by: SURGERY

## 2021-01-01 PROCEDURE — 25010000003 MAGNESIUM SULFATE 4 GM/100ML SOLUTION: Performed by: SURGERY

## 2021-01-01 PROCEDURE — 25010000002 CEFTAZIDIME PER 500 MG: Performed by: NURSE PRACTITIONER

## 2021-01-01 PROCEDURE — 93010 ELECTROCARDIOGRAM REPORT: CPT | Performed by: INTERNAL MEDICINE

## 2021-01-01 PROCEDURE — 82465 ASSAY BLD/SERUM CHOLESTEROL: CPT | Performed by: INTERNAL MEDICINE

## 2021-01-01 PROCEDURE — 25010000002 ONDANSETRON PER 1 MG: Performed by: SURGERY

## 2021-01-01 PROCEDURE — 25010000002 LEVETRIRACETAM PER 10 MG: Performed by: PSYCHIATRY & NEUROLOGY

## 2021-01-01 PROCEDURE — 93970 EXTREMITY STUDY: CPT

## 2021-01-01 PROCEDURE — 25010000002 DEXAMETHASONE PER 1 MG: Performed by: NURSE PRACTITIONER

## 2021-01-01 PROCEDURE — C1751 CATH, INF, PER/CENT/MIDLINE: HCPCS

## 2021-01-01 PROCEDURE — 84484 ASSAY OF TROPONIN QUANT: CPT | Performed by: EMERGENCY MEDICINE

## 2021-01-01 PROCEDURE — 0B110F4 BYPASS TRACHEA TO CUTANEOUS WITH TRACHEOSTOMY DEVICE, OPEN APPROACH: ICD-10-PCS | Performed by: SURGERY

## 2021-01-01 PROCEDURE — 84439 ASSAY OF FREE THYROXINE: CPT | Performed by: NURSE PRACTITIONER

## 2021-01-01 PROCEDURE — 70551 MRI BRAIN STEM W/O DYE: CPT

## 2021-01-01 PROCEDURE — 25010000002 ENOXAPARIN PER 10 MG: Performed by: SURGERY

## 2021-01-01 PROCEDURE — 99239 HOSP IP/OBS DSCHRG MGMT >30: CPT | Performed by: NURSE PRACTITIONER

## 2021-01-01 PROCEDURE — 85025 COMPLETE CBC W/AUTO DIFF WBC: CPT | Performed by: EMERGENCY MEDICINE

## 2021-01-01 PROCEDURE — 93005 ELECTROCARDIOGRAM TRACING: CPT | Performed by: NURSE PRACTITIONER

## 2021-01-01 PROCEDURE — 25010000002 CEFTAZIDIME PER 500 MG

## 2021-01-01 PROCEDURE — 95819 EEG AWAKE AND ASLEEP: CPT

## 2021-01-01 PROCEDURE — 87040 BLOOD CULTURE FOR BACTERIA: CPT | Performed by: NURSE PRACTITIONER

## 2021-01-01 PROCEDURE — 93970 EXTREMITY STUDY: CPT | Performed by: INTERNAL MEDICINE

## 2021-01-01 PROCEDURE — 25010000002 PHENYLEPHRINE 10 MG/ML SOLUTION

## 2021-01-01 PROCEDURE — 87040 BLOOD CULTURE FOR BACTERIA: CPT | Performed by: EMERGENCY MEDICINE

## 2021-01-01 PROCEDURE — 70498 CT ANGIOGRAPHY NECK: CPT

## 2021-01-01 PROCEDURE — 84443 ASSAY THYROID STIM HORMONE: CPT | Performed by: NURSE PRACTITIONER

## 2021-01-01 PROCEDURE — 80202 ASSAY OF VANCOMYCIN: CPT

## 2021-01-01 PROCEDURE — 25010000002 ENOXAPARIN PER 10 MG: Performed by: NURSE PRACTITIONER

## 2021-01-01 PROCEDURE — 87070 CULTURE OTHR SPECIMN AEROBIC: CPT | Performed by: NURSE PRACTITIONER

## 2021-01-01 PROCEDURE — 25010000002 DIAZEPAM PER 5 MG: Performed by: INTERNAL MEDICINE

## 2021-01-01 PROCEDURE — 85018 HEMOGLOBIN: CPT | Performed by: NURSE PRACTITIONER

## 2021-01-01 PROCEDURE — 85027 COMPLETE CBC AUTOMATED: CPT | Performed by: NURSE PRACTITIONER

## 2021-01-01 PROCEDURE — 94660 CPAP INITIATION&MGMT: CPT

## 2021-01-01 PROCEDURE — 82533 TOTAL CORTISOL: CPT | Performed by: INTERNAL MEDICINE

## 2021-01-01 PROCEDURE — 85610 PROTHROMBIN TIME: CPT | Performed by: NURSE PRACTITIONER

## 2021-01-01 PROCEDURE — 87305 ASPERGILLUS AG IA: CPT | Performed by: INTERNAL MEDICINE

## 2021-01-01 PROCEDURE — 86901 BLOOD TYPING SEROLOGIC RH(D): CPT | Performed by: NURSE PRACTITIONER

## 2021-01-01 PROCEDURE — 83036 HEMOGLOBIN GLYCOSYLATED A1C: CPT | Performed by: NURSE PRACTITIONER

## 2021-01-01 PROCEDURE — C1894 INTRO/SHEATH, NON-LASER: HCPCS

## 2021-01-01 PROCEDURE — 02HV33Z INSERTION OF INFUSION DEVICE INTO SUPERIOR VENA CAVA, PERCUTANEOUS APPROACH: ICD-10-PCS | Performed by: INTERNAL MEDICINE

## 2021-01-01 PROCEDURE — 25010000002 MIDAZOLAM PER 1 MG: Performed by: INTERNAL MEDICINE

## 2021-01-01 PROCEDURE — 83605 ASSAY OF LACTIC ACID: CPT | Performed by: EMERGENCY MEDICINE

## 2021-01-01 PROCEDURE — P9047 ALBUMIN (HUMAN), 25%, 50ML: HCPCS | Performed by: NURSE PRACTITIONER

## 2021-01-01 PROCEDURE — 0W9B30Z DRAINAGE OF LEFT PLEURAL CAVITY WITH DRAINAGE DEVICE, PERCUTANEOUS APPROACH: ICD-10-PCS | Performed by: INTERNAL MEDICINE

## 2021-01-01 PROCEDURE — 80048 BASIC METABOLIC PNL TOTAL CA: CPT | Performed by: INTERNAL MEDICINE

## 2021-01-01 PROCEDURE — 80061 LIPID PANEL: CPT | Performed by: NURSE PRACTITIONER

## 2021-01-01 PROCEDURE — 25010000002 CEFTAZIDIME PER 500 MG: Performed by: SURGERY

## 2021-01-01 PROCEDURE — 36430 TRANSFUSION BLD/BLD COMPNT: CPT

## 2021-01-01 PROCEDURE — 25010000002 METHYLNALTREXONE 12 MG/0.6ML SOLUTION: Performed by: INTERNAL MEDICINE

## 2021-01-01 PROCEDURE — 70450 CT HEAD/BRAIN W/O DYE: CPT

## 2021-01-01 PROCEDURE — 0202U NFCT DS 22 TRGT SARS-COV-2: CPT | Performed by: EMERGENCY MEDICINE

## 2021-01-01 PROCEDURE — P9016 RBC LEUKOCYTES REDUCED: HCPCS

## 2021-01-01 PROCEDURE — 95714 VEEG EA 12-26 HR UNMNTR: CPT

## 2021-01-01 PROCEDURE — 87086 URINE CULTURE/COLONY COUNT: CPT | Performed by: INTERNAL MEDICINE

## 2021-01-01 PROCEDURE — 87040 BLOOD CULTURE FOR BACTERIA: CPT | Performed by: INTERNAL MEDICINE

## 2021-01-01 PROCEDURE — 82746 ASSAY OF FOLIC ACID SERUM: CPT | Performed by: PSYCHIATRY & NEUROLOGY

## 2021-01-01 PROCEDURE — 83615 LACTATE (LD) (LDH) ENZYME: CPT | Performed by: EMERGENCY MEDICINE

## 2021-01-01 PROCEDURE — 0WPBX0Z REMOVAL OF DRAINAGE DEVICE FROM LEFT PLEURAL CAVITY, EXTERNAL APPROACH: ICD-10-PCS | Performed by: INTERNAL MEDICINE

## 2021-01-01 PROCEDURE — 25010000002 LEVETRIRACETAM PER 10 MG: Performed by: INTERNAL MEDICINE

## 2021-01-01 PROCEDURE — 25010000002 MORPHINE PER 10 MG: Performed by: INTERNAL MEDICINE

## 2021-01-01 PROCEDURE — 83605 ASSAY OF LACTIC ACID: CPT | Performed by: INTERNAL MEDICINE

## 2021-01-01 PROCEDURE — 87181 SC STD AGAR DILUTION PER AGT: CPT | Performed by: INTERNAL MEDICINE

## 2021-01-01 PROCEDURE — XW033E5 INTRODUCTION OF REMDESIVIR ANTI-INFECTIVE INTO PERIPHERAL VEIN, PERCUTANEOUS APPROACH, NEW TECHNOLOGY GROUP 5: ICD-10-PCS | Performed by: INTERNAL MEDICINE

## 2021-01-01 PROCEDURE — 74176 CT ABD & PELVIS W/O CONTRAST: CPT

## 2021-01-01 PROCEDURE — 87086 URINE CULTURE/COLONY COUNT: CPT | Performed by: NURSE PRACTITIONER

## 2021-01-01 PROCEDURE — 99223 1ST HOSP IP/OBS HIGH 75: CPT | Performed by: INTERNAL MEDICINE

## 2021-01-01 PROCEDURE — 25010000002 FUROSEMIDE PER 20 MG: Performed by: INTERNAL MEDICINE

## 2021-01-01 PROCEDURE — 25010000002 HYDROMORPHONE 1 MG/ML SOLUTION: Performed by: NURSE PRACTITIONER

## 2021-01-01 PROCEDURE — 93306 TTE W/DOPPLER COMPLETE: CPT

## 2021-01-01 PROCEDURE — 0042T HC CT CEREBRAL PERFUSION W/WO CONTRAST: CPT

## 2021-01-01 PROCEDURE — 87181 SC STD AGAR DILUTION PER AGT: CPT | Performed by: NURSE PRACTITIONER

## 2021-01-01 PROCEDURE — 84478 ASSAY OF TRIGLYCERIDES: CPT | Performed by: SURGERY

## 2021-01-01 PROCEDURE — 32551 INSERTION OF CHEST TUBE: CPT | Performed by: INTERNAL MEDICINE

## 2021-01-01 PROCEDURE — 71250 CT THORAX DX C-: CPT

## 2021-01-01 PROCEDURE — 80198 ASSAY OF THEOPHYLLINE: CPT | Performed by: INTERNAL MEDICINE

## 2021-01-01 PROCEDURE — 80198 ASSAY OF THEOPHYLLINE: CPT | Performed by: NURSE PRACTITIONER

## 2021-01-01 PROCEDURE — 81003 URINALYSIS AUTO W/O SCOPE: CPT | Performed by: NURSE PRACTITIONER

## 2021-01-01 PROCEDURE — 84132 ASSAY OF SERUM POTASSIUM: CPT | Performed by: NURSE PRACTITIONER

## 2021-01-01 PROCEDURE — 93005 ELECTROCARDIOGRAM TRACING: CPT | Performed by: EMERGENCY MEDICINE

## 2021-01-01 PROCEDURE — 86900 BLOOD TYPING SEROLOGIC ABO: CPT

## 2021-01-01 PROCEDURE — 82570 ASSAY OF URINE CREATININE: CPT | Performed by: INTERNAL MEDICINE

## 2021-01-01 PROCEDURE — 63710000001 ONDANSETRON PER 8 MG: Performed by: NURSE PRACTITIONER

## 2021-01-01 PROCEDURE — 80202 ASSAY OF VANCOMYCIN: CPT | Performed by: INTERNAL MEDICINE

## 2021-01-01 PROCEDURE — 25010000002 FUROSEMIDE PER 20 MG: Performed by: FAMILY MEDICINE

## 2021-01-01 PROCEDURE — 85014 HEMATOCRIT: CPT | Performed by: NURSE PRACTITIONER

## 2021-01-01 PROCEDURE — 25010000002 NEOSTIGMINE 10 MG/10ML SOLUTION: Performed by: NURSE ANESTHETIST, CERTIFIED REGISTERED

## 2021-01-01 PROCEDURE — 0W9930Z DRAINAGE OF RIGHT PLEURAL CAVITY WITH DRAINAGE DEVICE, PERCUTANEOUS APPROACH: ICD-10-PCS | Performed by: INTERNAL MEDICINE

## 2021-01-01 PROCEDURE — 86923 COMPATIBILITY TEST ELECTRIC: CPT

## 2021-01-01 PROCEDURE — 82330 ASSAY OF CALCIUM: CPT | Performed by: NURSE PRACTITIONER

## 2021-01-01 PROCEDURE — 25010000002 ONDANSETRON PER 1 MG: Performed by: NURSE PRACTITIONER

## 2021-01-01 PROCEDURE — 83880 ASSAY OF NATRIURETIC PEPTIDE: CPT | Performed by: EMERGENCY MEDICINE

## 2021-01-01 PROCEDURE — 85018 HEMOGLOBIN: CPT | Performed by: INTERNAL MEDICINE

## 2021-01-01 PROCEDURE — 87449 NOS EACH ORGANISM AG IA: CPT | Performed by: INTERNAL MEDICINE

## 2021-01-01 PROCEDURE — 25010000002 HYDROMORPHONE HCL-NACL 30-0.9 MG/30ML-% SOLUTION PREFILLED SYRINGE: Performed by: SURGERY

## 2021-01-01 PROCEDURE — 63710000001 INSULIN LISPRO (HUMAN) PER 5 UNITS: Performed by: NURSE PRACTITIONER

## 2021-01-01 PROCEDURE — 85007 BL SMEAR W/DIFF WBC COUNT: CPT | Performed by: SURGERY

## 2021-01-01 PROCEDURE — 25010000002 CALCIUM GLUCONATE PER 10 ML: Performed by: NURSE PRACTITIONER

## 2021-01-01 PROCEDURE — 87077 CULTURE AEROBIC IDENTIFY: CPT | Performed by: NURSE PRACTITIONER

## 2021-01-01 PROCEDURE — 84145 PROCALCITONIN (PCT): CPT | Performed by: INTERNAL MEDICINE

## 2021-01-01 PROCEDURE — 25010000002 ALBUMIN HUMAN 5% PER 50 ML: Performed by: INTERNAL MEDICINE

## 2021-01-01 RX ORDER — ALBUMIN, HUMAN INJ 5% 5 %
25 SOLUTION INTRAVENOUS ONCE
Status: COMPLETED | OUTPATIENT
Start: 2021-01-01 | End: 2021-01-01

## 2021-01-01 RX ORDER — VECURONIUM BROMIDE 1 MG/ML
10 INJECTION, POWDER, LYOPHILIZED, FOR SOLUTION INTRAVENOUS ONCE
Status: COMPLETED | OUTPATIENT
Start: 2021-01-01 | End: 2021-01-01

## 2021-01-01 RX ORDER — SODIUM CHLORIDE 0.9 % (FLUSH) 0.9 %
10 SYRINGE (ML) INJECTION EVERY 12 HOURS SCHEDULED
Status: CANCELLED | OUTPATIENT
Start: 2021-01-01

## 2021-01-01 RX ORDER — BUDESONIDE AND FORMOTEROL FUMARATE DIHYDRATE 160; 4.5 UG/1; UG/1
2 AEROSOL RESPIRATORY (INHALATION)
Status: DISCONTINUED | OUTPATIENT
Start: 2021-01-01 | End: 2021-01-01

## 2021-01-01 RX ORDER — MIDAZOLAM HYDROCHLORIDE 1 MG/ML
4 INJECTION INTRAMUSCULAR; INTRAVENOUS ONCE
Status: COMPLETED | OUTPATIENT
Start: 2021-01-01 | End: 2021-01-01

## 2021-01-01 RX ORDER — SODIUM CHLORIDE, SODIUM LACTATE, POTASSIUM CHLORIDE, CALCIUM CHLORIDE 600; 310; 30; 20 MG/100ML; MG/100ML; MG/100ML; MG/100ML
INJECTION, SOLUTION INTRAVENOUS CONTINUOUS PRN
Status: DISCONTINUED | OUTPATIENT
Start: 2021-01-01 | End: 2021-01-01 | Stop reason: SURG

## 2021-01-01 RX ORDER — QUETIAPINE FUMARATE 25 MG/1
50 TABLET, FILM COATED ORAL EVERY 12 HOURS SCHEDULED
Status: DISCONTINUED | OUTPATIENT
Start: 2021-01-01 | End: 2021-01-01

## 2021-01-01 RX ORDER — PHENYLEPHRINE HCL IN 0.9% NACL 0.5 MG/5ML
SYRINGE (ML) INTRAVENOUS
Status: COMPLETED
Start: 2021-01-01 | End: 2021-01-01

## 2021-01-01 RX ORDER — METHYLPREDNISOLONE SODIUM SUCCINATE 40 MG/ML
40 INJECTION, POWDER, LYOPHILIZED, FOR SOLUTION INTRAMUSCULAR; INTRAVENOUS EVERY 12 HOURS
Status: COMPLETED | OUTPATIENT
Start: 2021-01-01 | End: 2021-01-01

## 2021-01-01 RX ORDER — FUROSEMIDE 10 MG/ML
40 INJECTION INTRAMUSCULAR; INTRAVENOUS ONCE
Status: COMPLETED | OUTPATIENT
Start: 2021-01-01 | End: 2021-01-01

## 2021-01-01 RX ORDER — POLYETHYLENE GLYCOL 3350 17 G/17G
17 POWDER, FOR SOLUTION ORAL DAILY
Status: DISCONTINUED | OUTPATIENT
Start: 2021-01-01 | End: 2021-01-01 | Stop reason: HOSPADM

## 2021-01-01 RX ORDER — ALBUMIN, HUMAN INJ 5% 5 %
500 SOLUTION INTRAVENOUS ONCE
Status: COMPLETED | OUTPATIENT
Start: 2021-01-01 | End: 2021-01-01

## 2021-01-01 RX ORDER — BISACODYL 10 MG
10 SUPPOSITORY, RECTAL RECTAL DAILY PRN
Start: 2021-01-01

## 2021-01-01 RX ORDER — NOREPINEPHRINE BIT/0.9 % NACL 8 MG/250ML
.02-.3 INFUSION BOTTLE (ML) INTRAVENOUS
Status: DISCONTINUED | OUTPATIENT
Start: 2021-01-01 | End: 2021-01-01

## 2021-01-01 RX ORDER — QUETIAPINE FUMARATE 25 MG/1
50 TABLET, FILM COATED ORAL ONCE
Status: COMPLETED | OUTPATIENT
Start: 2021-01-01 | End: 2021-01-01

## 2021-01-01 RX ORDER — DIAZEPAM 5 MG/ML
5 INJECTION, SOLUTION INTRAMUSCULAR; INTRAVENOUS EVERY 6 HOURS SCHEDULED
Status: DISCONTINUED | OUTPATIENT
Start: 2021-01-01 | End: 2021-01-01

## 2021-01-01 RX ORDER — MIDAZOLAM IN NACL,ISO-OSMOT/PF 50 MG/50ML
1-10 INFUSION BOTTLE (ML) INTRAVENOUS
Status: DISCONTINUED | OUTPATIENT
Start: 2021-01-01 | End: 2021-01-01

## 2021-01-01 RX ORDER — THEOPHYLLINE 300 MG/1
300 TABLET, EXTENDED RELEASE ORAL 2 TIMES DAILY
COMMUNITY
End: 2021-01-01 | Stop reason: HOSPADM

## 2021-01-01 RX ORDER — SODIUM CHLORIDE 0.9 % (FLUSH) 0.9 %
20 SYRINGE (ML) INJECTION AS NEEDED
Status: DISCONTINUED | OUTPATIENT
Start: 2021-01-01 | End: 2021-01-01

## 2021-01-01 RX ORDER — DEXTROSE MONOHYDRATE 25 G/50ML
50 INJECTION, SOLUTION INTRAVENOUS ONCE
Status: COMPLETED | OUTPATIENT
Start: 2021-01-01 | End: 2021-01-01

## 2021-01-01 RX ORDER — BUMETANIDE 0.25 MG/ML
2 INJECTION INTRAMUSCULAR; INTRAVENOUS ONCE
Status: COMPLETED | OUTPATIENT
Start: 2021-01-01 | End: 2021-01-01

## 2021-01-01 RX ORDER — MAGNESIUM SULFATE HEPTAHYDRATE 40 MG/ML
2 INJECTION, SOLUTION INTRAVENOUS AS NEEDED
Status: DISCONTINUED | OUTPATIENT
Start: 2021-01-01 | End: 2021-01-01 | Stop reason: HOSPADM

## 2021-01-01 RX ORDER — DROPERIDOL 2.5 MG/ML
0.62 INJECTION, SOLUTION INTRAMUSCULAR; INTRAVENOUS AS NEEDED
Status: CANCELLED | OUTPATIENT
Start: 2021-01-01

## 2021-01-01 RX ORDER — PANTOPRAZOLE SODIUM 40 MG/10ML
40 INJECTION, POWDER, LYOPHILIZED, FOR SOLUTION INTRAVENOUS EVERY 12 HOURS SCHEDULED
Status: DISCONTINUED | OUTPATIENT
Start: 2021-01-01 | End: 2021-01-01 | Stop reason: HOSPADM

## 2021-01-01 RX ORDER — POLYETHYLENE GLYCOL 3350 17 G/17G
17 POWDER, FOR SOLUTION ORAL DAILY
Status: DISCONTINUED | OUTPATIENT
Start: 2021-01-01 | End: 2021-01-01

## 2021-01-01 RX ORDER — DEXAMETHASONE SODIUM PHOSPHATE 4 MG/ML
6 INJECTION, SOLUTION INTRA-ARTICULAR; INTRALESIONAL; INTRAMUSCULAR; INTRAVENOUS; SOFT TISSUE
Status: DISCONTINUED | OUTPATIENT
Start: 2021-01-01 | End: 2021-01-01

## 2021-01-01 RX ORDER — LORAZEPAM 2 MG/ML
2 INJECTION INTRAMUSCULAR ONCE
Status: COMPLETED | OUTPATIENT
Start: 2021-01-01 | End: 2021-01-01

## 2021-01-01 RX ORDER — NOREPINEPHRINE BIT/0.9 % NACL 8 MG/250ML
INFUSION BOTTLE (ML) INTRAVENOUS
Status: COMPLETED
Start: 2021-01-01 | End: 2021-01-01

## 2021-01-01 RX ORDER — ACETAZOLAMIDE 250 MG/1
500 TABLET ORAL DAILY
Status: COMPLETED | OUTPATIENT
Start: 2021-01-01 | End: 2021-01-01

## 2021-01-01 RX ORDER — OXYCODONE HYDROCHLORIDE 5 MG/1
10 TABLET ORAL EVERY 6 HOURS SCHEDULED
Status: DISCONTINUED | OUTPATIENT
Start: 2021-01-01 | End: 2021-01-01

## 2021-01-01 RX ORDER — LEVOTHYROXINE SODIUM 20 UG/ML
100 INJECTION, SOLUTION INTRAVENOUS
Status: DISCONTINUED | OUTPATIENT
Start: 2021-01-01 | End: 2021-01-01

## 2021-01-01 RX ORDER — DIAZEPAM 5 MG/1
5 TABLET ORAL EVERY 6 HOURS
Status: DISCONTINUED | OUTPATIENT
Start: 2021-01-01 | End: 2021-01-01

## 2021-01-01 RX ORDER — ALBUMIN (HUMAN) 12.5 G/50ML
12.5 SOLUTION INTRAVENOUS ONCE
Status: COMPLETED | OUTPATIENT
Start: 2021-01-01 | End: 2021-01-01

## 2021-01-01 RX ORDER — MAGNESIUM SULFATE HEPTAHYDRATE 40 MG/ML
4 INJECTION, SOLUTION INTRAVENOUS AS NEEDED
Status: DISCONTINUED | OUTPATIENT
Start: 2021-01-01 | End: 2021-01-01 | Stop reason: HOSPADM

## 2021-01-01 RX ORDER — LEVOTHYROXINE SODIUM 0.1 MG/1
100 TABLET ORAL
Status: DISCONTINUED | OUTPATIENT
Start: 2021-01-01 | End: 2021-01-01

## 2021-01-01 RX ORDER — LORAZEPAM 2 MG/ML
0.5 INJECTION INTRAMUSCULAR EVERY 4 HOURS PRN
Status: DISCONTINUED | OUTPATIENT
Start: 2021-01-01 | End: 2021-01-01 | Stop reason: HOSPADM

## 2021-01-01 RX ORDER — METRONIDAZOLE 500 MG/1
500 TABLET ORAL 3 TIMES DAILY
COMMUNITY
Start: 2021-01-01 | End: 2021-01-01 | Stop reason: HOSPADM

## 2021-01-01 RX ORDER — ROPINIROLE 4 MG/1
4 TABLET, FILM COATED ORAL NIGHTLY
Start: 2021-01-01

## 2021-01-01 RX ORDER — POTASSIUM CHLORIDE 1.5 G/1.77G
40 POWDER, FOR SOLUTION ORAL AS NEEDED
Status: DISCONTINUED | OUTPATIENT
Start: 2021-01-01 | End: 2021-01-01

## 2021-01-01 RX ORDER — NYSTATIN 100000 [USP'U]/G
POWDER TOPICAL EVERY 12 HOURS SCHEDULED
Status: DISCONTINUED | OUTPATIENT
Start: 2021-01-01 | End: 2021-01-01 | Stop reason: HOSPADM

## 2021-01-01 RX ORDER — PROMETHAZINE HYDROCHLORIDE 25 MG/1
25 TABLET ORAL ONCE AS NEEDED
Status: CANCELLED | OUTPATIENT
Start: 2021-01-01

## 2021-01-01 RX ORDER — PREDNISONE 20 MG/1
20 TABLET ORAL
Status: COMPLETED | OUTPATIENT
Start: 2021-01-01 | End: 2021-01-01

## 2021-01-01 RX ORDER — ALBUTEROL SULFATE 2.5 MG/3ML
2.5 SOLUTION RESPIRATORY (INHALATION)
Status: DISCONTINUED | OUTPATIENT
Start: 2021-01-01 | End: 2021-01-01 | Stop reason: HOSPADM

## 2021-01-01 RX ORDER — ONDANSETRON 4 MG/1
4 TABLET, FILM COATED ORAL EVERY 6 HOURS PRN
Status: DISCONTINUED | OUTPATIENT
Start: 2021-01-01 | End: 2021-01-01 | Stop reason: HOSPADM

## 2021-01-01 RX ORDER — BUPIVACAINE HYDROCHLORIDE AND EPINEPHRINE 2.5; 5 MG/ML; UG/ML
INJECTION, SOLUTION EPIDURAL; INFILTRATION; INTRACAUDAL; PERINEURAL AS NEEDED
Status: DISCONTINUED | OUTPATIENT
Start: 2021-01-01 | End: 2021-01-01 | Stop reason: HOSPADM

## 2021-01-01 RX ORDER — SODIUM CHLORIDE, SODIUM LACTATE, POTASSIUM CHLORIDE, CALCIUM CHLORIDE 600; 310; 30; 20 MG/100ML; MG/100ML; MG/100ML; MG/100ML
9 INJECTION, SOLUTION INTRAVENOUS CONTINUOUS
Status: CANCELLED | OUTPATIENT
Start: 2021-01-01

## 2021-01-01 RX ORDER — DOCUSATE SODIUM 50 MG/5 ML
100 LIQUID (ML) ORAL 2 TIMES DAILY
Status: DISCONTINUED | OUTPATIENT
Start: 2021-01-01 | End: 2021-01-01 | Stop reason: HOSPADM

## 2021-01-01 RX ORDER — QUETIAPINE FUMARATE 25 MG/1
75 TABLET, FILM COATED ORAL EVERY 12 HOURS SCHEDULED
Status: DISCONTINUED | OUTPATIENT
Start: 2021-01-01 | End: 2021-01-01

## 2021-01-01 RX ORDER — FLUTICASONE PROPIONATE 220 UG/1
2 AEROSOL, METERED RESPIRATORY (INHALATION)
COMMUNITY
End: 2021-01-01 | Stop reason: HOSPADM

## 2021-01-01 RX ORDER — GLYCOPYRROLATE 0.2 MG/ML
INJECTION INTRAMUSCULAR; INTRAVENOUS AS NEEDED
Status: DISCONTINUED | OUTPATIENT
Start: 2021-01-01 | End: 2021-01-01 | Stop reason: SURG

## 2021-01-01 RX ORDER — LOPERAMIDE HYDROCHLORIDE 2 MG/1
2 TABLET ORAL 3 TIMES DAILY PRN
COMMUNITY
Start: 2021-01-01 | End: 2021-01-01 | Stop reason: HOSPADM

## 2021-01-01 RX ORDER — VALACYCLOVIR HYDROCHLORIDE 500 MG/1
1000 TABLET, FILM COATED ORAL EVERY 12 HOURS SCHEDULED
Status: DISCONTINUED | OUTPATIENT
Start: 2021-01-01 | End: 2021-01-01

## 2021-01-01 RX ORDER — DIAZEPAM 5 MG/ML
10 INJECTION, SOLUTION INTRAMUSCULAR; INTRAVENOUS EVERY 4 HOURS PRN
Status: DISCONTINUED | OUTPATIENT
Start: 2021-01-01 | End: 2021-01-01

## 2021-01-01 RX ORDER — ALBUMIN (HUMAN) 12.5 G/50ML
25 SOLUTION INTRAVENOUS ONCE
Status: COMPLETED | OUTPATIENT
Start: 2021-01-01 | End: 2021-01-01

## 2021-01-01 RX ORDER — SODIUM CHLORIDE, SODIUM LACTATE, POTASSIUM CHLORIDE, CALCIUM CHLORIDE 600; 310; 30; 20 MG/100ML; MG/100ML; MG/100ML; MG/100ML
75 INJECTION, SOLUTION INTRAVENOUS CONTINUOUS
Status: DISCONTINUED | OUTPATIENT
Start: 2021-01-01 | End: 2021-01-01

## 2021-01-01 RX ORDER — HYDROCODONE BITARTRATE AND ACETAMINOPHEN 5; 325 MG/1; MG/1
1 TABLET ORAL EVERY 6 HOURS PRN
Start: 2021-01-01

## 2021-01-01 RX ORDER — PREDNISONE 10 MG/1
10 TABLET ORAL
Status: COMPLETED | OUTPATIENT
Start: 2021-01-01 | End: 2021-01-01

## 2021-01-01 RX ORDER — OMEGA-3S/DHA/EPA/FISH OIL/D3 300MG-1000
1000 CAPSULE ORAL DAILY
COMMUNITY
End: 2021-01-01 | Stop reason: HOSPADM

## 2021-01-01 RX ORDER — LEVOFLOXACIN 5 MG/ML
500 INJECTION, SOLUTION INTRAVENOUS EVERY 24 HOURS
Status: COMPLETED | OUTPATIENT
Start: 2021-01-01 | End: 2021-01-01

## 2021-01-01 RX ORDER — ACETYLCYSTEINE 200 MG/ML
2 SOLUTION ORAL; RESPIRATORY (INHALATION)
Status: DISCONTINUED | OUTPATIENT
Start: 2021-01-01 | End: 2021-01-01 | Stop reason: HOSPADM

## 2021-01-01 RX ORDER — ACETAMINOPHEN 160 MG/5ML
650 SOLUTION ORAL EVERY 4 HOURS PRN
Status: DISCONTINUED | OUTPATIENT
Start: 2021-01-01 | End: 2021-01-01

## 2021-01-01 RX ORDER — MIDAZOLAM HYDROCHLORIDE 1 MG/ML
1 INJECTION INTRAMUSCULAR; INTRAVENOUS
Status: CANCELLED | OUTPATIENT
Start: 2021-01-01

## 2021-01-01 RX ORDER — ALBUTEROL SULFATE 90 UG/1
2 AEROSOL, METERED RESPIRATORY (INHALATION)
Status: DISCONTINUED | OUTPATIENT
Start: 2021-01-01 | End: 2021-01-01

## 2021-01-01 RX ORDER — ONDANSETRON 2 MG/ML
4 INJECTION INTRAMUSCULAR; INTRAVENOUS EVERY 6 HOURS PRN
Status: DISCONTINUED | OUTPATIENT
Start: 2021-01-01 | End: 2021-01-01 | Stop reason: HOSPADM

## 2021-01-01 RX ORDER — OXYCODONE HYDROCHLORIDE 5 MG/1
10 TABLET ORAL
Status: DISCONTINUED | OUTPATIENT
Start: 2021-01-01 | End: 2021-01-01 | Stop reason: HOSPADM

## 2021-01-01 RX ORDER — GLYCOPYRROLATE 0.2 MG/ML
0.4 INJECTION INTRAMUSCULAR; INTRAVENOUS EVERY 4 HOURS PRN
Status: DISCONTINUED | OUTPATIENT
Start: 2021-01-01 | End: 2021-02-13 | Stop reason: HOSPADM

## 2021-01-01 RX ORDER — LORAZEPAM 2 MG/ML
1 INJECTION INTRAMUSCULAR
Status: DISPENSED | OUTPATIENT
Start: 2021-01-01 | End: 2021-01-01

## 2021-01-01 RX ORDER — CHLORHEXIDINE GLUCONATE 0.12 MG/ML
15 RINSE ORAL EVERY 12 HOURS SCHEDULED
Start: 2021-01-01

## 2021-01-01 RX ORDER — ROPINIROLE 2 MG/1
4 TABLET, FILM COATED ORAL NIGHTLY
Status: DISCONTINUED | OUTPATIENT
Start: 2021-01-01 | End: 2021-01-01 | Stop reason: HOSPADM

## 2021-01-01 RX ORDER — QUETIAPINE FUMARATE 100 MG/1
100 TABLET, FILM COATED ORAL EVERY 8 HOURS SCHEDULED
Start: 2021-01-01

## 2021-01-01 RX ORDER — LISINOPRIL 5 MG/1
5 TABLET ORAL DAILY
COMMUNITY
End: 2021-01-01 | Stop reason: HOSPADM

## 2021-01-01 RX ORDER — PHENOL 1.4 %
600 AEROSOL, SPRAY (ML) MUCOUS MEMBRANE DAILY
COMMUNITY
End: 2021-01-01 | Stop reason: HOSPADM

## 2021-01-01 RX ORDER — BUPIVACAINE HCL/0.9 % NACL/PF 0.125 %
PLASTIC BAG, INJECTION (ML) EPIDURAL AS NEEDED
Status: DISCONTINUED | OUTPATIENT
Start: 2021-01-01 | End: 2021-01-01 | Stop reason: SURG

## 2021-01-01 RX ORDER — NYSTATIN 100000 [USP'U]/G
POWDER TOPICAL EVERY 12 HOURS SCHEDULED
Start: 2021-01-01

## 2021-01-01 RX ORDER — SODIUM CHLORIDE 0.9 % (FLUSH) 0.9 %
10 SYRINGE (ML) INJECTION EVERY 12 HOURS SCHEDULED
Status: DISCONTINUED | OUTPATIENT
Start: 2021-01-01 | End: 2021-01-01

## 2021-01-01 RX ORDER — SODIUM CHLORIDE 0.9 % (FLUSH) 0.9 %
3-10 SYRINGE (ML) INJECTION AS NEEDED
Status: CANCELLED | OUTPATIENT
Start: 2021-01-01

## 2021-01-01 RX ORDER — HYDROCODONE BITARTRATE AND ACETAMINOPHEN 5; 325 MG/1; MG/1
1 TABLET ORAL EVERY 6 HOURS PRN
Status: DISCONTINUED | OUTPATIENT
Start: 2021-01-01 | End: 2021-01-01 | Stop reason: HOSPADM

## 2021-01-01 RX ORDER — DEXTROSE MONOHYDRATE 25 G/50ML
25 INJECTION, SOLUTION INTRAVENOUS
Status: DISCONTINUED | OUTPATIENT
Start: 2021-01-01 | End: 2021-01-01 | Stop reason: HOSPADM

## 2021-01-01 RX ORDER — DIAZEPAM 5 MG/1
5 TABLET ORAL EVERY 8 HOURS
Status: DISCONTINUED | OUTPATIENT
Start: 2021-01-01 | End: 2021-01-01

## 2021-01-01 RX ORDER — FUROSEMIDE 10 MG/ML
INJECTION INTRAMUSCULAR; INTRAVENOUS
Status: COMPLETED
Start: 2021-01-01 | End: 2021-01-01

## 2021-01-01 RX ORDER — ROPINIROLE 2 MG/1
4 TABLET, FILM COATED ORAL NIGHTLY
Status: DISCONTINUED | OUTPATIENT
Start: 2021-01-01 | End: 2021-01-01

## 2021-01-01 RX ORDER — DOCUSATE SODIUM 50 MG/5 ML
100 LIQUID (ML) ORAL 2 TIMES DAILY
Status: DISCONTINUED | OUTPATIENT
Start: 2021-01-01 | End: 2021-01-01

## 2021-01-01 RX ORDER — SODIUM CHLORIDE 0.9 % (FLUSH) 0.9 %
10 SYRINGE (ML) INJECTION AS NEEDED
Status: DISCONTINUED | OUTPATIENT
Start: 2021-01-01 | End: 2021-01-01 | Stop reason: HOSPADM

## 2021-01-01 RX ORDER — LEVOTHYROXINE SODIUM 20 UG/ML
75 INJECTION, SOLUTION INTRAVENOUS
Qty: 150 ML
Start: 2021-01-01

## 2021-01-01 RX ORDER — ONDANSETRON 2 MG/ML
4 INJECTION INTRAMUSCULAR; INTRAVENOUS EVERY 6 HOURS PRN
Start: 2021-01-01

## 2021-01-01 RX ORDER — PREDNISONE 20 MG/1
40 TABLET ORAL DAILY
Status: COMPLETED | OUTPATIENT
Start: 2021-01-01 | End: 2021-01-01

## 2021-01-01 RX ORDER — CHLORHEXIDINE GLUCONATE 0.12 MG/ML
15 RINSE ORAL EVERY 12 HOURS SCHEDULED
Status: DISCONTINUED | OUTPATIENT
Start: 2021-01-01 | End: 2021-02-13 | Stop reason: HOSPADM

## 2021-01-01 RX ORDER — CHLORHEXIDINE GLUCONATE 0.12 MG/ML
15 RINSE ORAL EVERY 12 HOURS SCHEDULED
Status: DISCONTINUED | OUTPATIENT
Start: 2021-01-01 | End: 2021-01-01 | Stop reason: HOSPADM

## 2021-01-01 RX ORDER — CALCIUM CHLORIDE 100 MG/ML
1 INJECTION INTRAVENOUS; INTRAVENTRICULAR ONCE
Status: COMPLETED | OUTPATIENT
Start: 2021-01-01 | End: 2021-01-01

## 2021-01-01 RX ORDER — ALBUTEROL SULFATE 2.5 MG/3ML
2.5 SOLUTION RESPIRATORY (INHALATION) EVERY 6 HOURS PRN
Status: DISCONTINUED | OUTPATIENT
Start: 2021-01-01 | End: 2021-02-13 | Stop reason: HOSPADM

## 2021-01-01 RX ORDER — ROCURONIUM BROMIDE 10 MG/ML
INJECTION, SOLUTION INTRAVENOUS AS NEEDED
Status: DISCONTINUED | OUTPATIENT
Start: 2021-01-01 | End: 2021-01-01 | Stop reason: SURG

## 2021-01-01 RX ORDER — MIDODRINE HYDROCHLORIDE 5 MG/1
5 TABLET ORAL
Status: DISCONTINUED | OUTPATIENT
Start: 2021-01-01 | End: 2021-01-01 | Stop reason: HOSPADM

## 2021-01-01 RX ORDER — POTASSIUM CHLORIDE 1.5 G/1.77G
40 POWDER, FOR SOLUTION ORAL AS NEEDED
Status: DISCONTINUED | OUTPATIENT
Start: 2021-01-01 | End: 2021-01-01 | Stop reason: HOSPADM

## 2021-01-01 RX ORDER — OXYCODONE HYDROCHLORIDE 10 MG/1
10 TABLET ORAL
Start: 2021-01-01

## 2021-01-01 RX ORDER — DEXTROSE MONOHYDRATE 25 G/50ML
25 INJECTION, SOLUTION INTRAVENOUS
Status: DISCONTINUED | OUTPATIENT
Start: 2021-01-01 | End: 2021-01-01

## 2021-01-01 RX ORDER — MIDODRINE HYDROCHLORIDE 5 MG/1
5 TABLET ORAL
Start: 2021-01-01

## 2021-01-01 RX ORDER — ACETAMINOPHEN 325 MG/1
650 TABLET ORAL EVERY 4 HOURS PRN
Status: DISCONTINUED | OUTPATIENT
Start: 2021-01-01 | End: 2021-01-01

## 2021-01-01 RX ORDER — NEOSTIGMINE METHYLSULFATE 1 MG/ML
INJECTION, SOLUTION INTRAVENOUS AS NEEDED
Status: DISCONTINUED | OUTPATIENT
Start: 2021-01-01 | End: 2021-01-01 | Stop reason: SURG

## 2021-01-01 RX ORDER — BUMETANIDE 0.25 MG/ML
1 INJECTION INTRAMUSCULAR; INTRAVENOUS ONCE
Status: COMPLETED | OUTPATIENT
Start: 2021-01-01 | End: 2021-01-01

## 2021-01-01 RX ORDER — ALBUTEROL SULFATE 90 UG/1
2 AEROSOL, METERED RESPIRATORY (INHALATION) 2 TIMES DAILY
COMMUNITY
End: 2021-01-01 | Stop reason: HOSPADM

## 2021-01-01 RX ORDER — DEXAMETHASONE SODIUM PHOSPHATE 4 MG/ML
6 INJECTION, SOLUTION INTRA-ARTICULAR; INTRALESIONAL; INTRAMUSCULAR; INTRAVENOUS; SOFT TISSUE ONCE
Status: COMPLETED | OUTPATIENT
Start: 2021-01-01 | End: 2021-01-01

## 2021-01-01 RX ORDER — PHENYLEPHRINE HCL IN 0.9% NACL 0.5 MG/5ML
.5-3 SYRINGE (ML) INTRAVENOUS
Status: DISCONTINUED | OUTPATIENT
Start: 2021-01-01 | End: 2021-01-01

## 2021-01-01 RX ORDER — LORAZEPAM 2 MG/ML
0.5 INJECTION INTRAMUSCULAR EVERY 4 HOURS PRN
Start: 2021-01-01 | End: 2021-01-01

## 2021-01-01 RX ORDER — QUETIAPINE FUMARATE 100 MG/1
100 TABLET, FILM COATED ORAL EVERY 8 HOURS SCHEDULED
Status: DISCONTINUED | OUTPATIENT
Start: 2021-01-01 | End: 2021-01-01 | Stop reason: HOSPADM

## 2021-01-01 RX ORDER — METHYLPREDNISOLONE SODIUM SUCCINATE 40 MG/ML
40 INJECTION, POWDER, LYOPHILIZED, FOR SOLUTION INTRAMUSCULAR; INTRAVENOUS EVERY 8 HOURS SCHEDULED
Status: DISCONTINUED | OUTPATIENT
Start: 2021-01-01 | End: 2021-01-01

## 2021-01-01 RX ORDER — SODIUM CHLORIDE 0.9 % (FLUSH) 0.9 %
10 SYRINGE (ML) INJECTION AS NEEDED
Status: DISCONTINUED | OUTPATIENT
Start: 2021-01-01 | End: 2021-01-01

## 2021-01-01 RX ORDER — LORAZEPAM 2 MG/ML
1 INJECTION INTRAMUSCULAR EVERY 6 HOURS
Status: DISCONTINUED | OUTPATIENT
Start: 2021-01-01 | End: 2021-01-01

## 2021-01-01 RX ORDER — LORAZEPAM 2 MG/ML
1 INJECTION INTRAMUSCULAR
Status: DISCONTINUED | OUTPATIENT
Start: 2021-01-01 | End: 2021-02-13 | Stop reason: HOSPADM

## 2021-01-01 RX ORDER — ONDANSETRON 2 MG/ML
4 INJECTION INTRAMUSCULAR; INTRAVENOUS EVERY 6 HOURS PRN
Status: DISCONTINUED | OUTPATIENT
Start: 2021-01-01 | End: 2021-01-01

## 2021-01-01 RX ORDER — ACETAMINOPHEN 160 MG/5ML
650 SOLUTION ORAL EVERY 4 HOURS PRN
Start: 2021-01-01

## 2021-01-01 RX ORDER — HYDROMORPHONE HYDROCHLORIDE 1 MG/ML
0.5 INJECTION, SOLUTION INTRAMUSCULAR; INTRAVENOUS; SUBCUTANEOUS
Status: CANCELLED | OUTPATIENT
Start: 2021-01-01

## 2021-01-01 RX ORDER — CETIRIZINE HYDROCHLORIDE 10 MG/1
10 TABLET ORAL DAILY
COMMUNITY
End: 2021-01-01 | Stop reason: HOSPADM

## 2021-01-01 RX ORDER — IPRATROPIUM BROMIDE AND ALBUTEROL SULFATE 2.5; .5 MG/3ML; MG/3ML
3 SOLUTION RESPIRATORY (INHALATION)
Status: DISCONTINUED | OUTPATIENT
Start: 2021-01-01 | End: 2021-01-01

## 2021-01-01 RX ORDER — IPRATROPIUM BROMIDE AND ALBUTEROL SULFATE 2.5; .5 MG/3ML; MG/3ML
3 SOLUTION RESPIRATORY (INHALATION) ONCE AS NEEDED
Status: CANCELLED | OUTPATIENT
Start: 2021-01-01

## 2021-01-01 RX ORDER — ETOMIDATE 2 MG/ML
40 INJECTION INTRAVENOUS ONCE
Status: COMPLETED | OUTPATIENT
Start: 2021-01-01 | End: 2021-01-01

## 2021-01-01 RX ORDER — AMINO ACIDS/PROTEIN HYDROLYS 15G-100/30
1 LIQUID (ML) ORAL 2 TIMES DAILY
Status: DISCONTINUED | OUTPATIENT
Start: 2021-01-01 | End: 2021-01-01

## 2021-01-01 RX ORDER — SODIUM CHLORIDE 0.9 % (FLUSH) 0.9 %
10 SYRINGE (ML) INJECTION AS NEEDED
Status: CANCELLED | OUTPATIENT
Start: 2021-01-01

## 2021-01-01 RX ORDER — FENTANYL CITRATE 50 UG/ML
50 INJECTION, SOLUTION INTRAMUSCULAR; INTRAVENOUS
Status: CANCELLED | OUTPATIENT
Start: 2021-01-01

## 2021-01-01 RX ORDER — WHEY PROTEIN ISOLATE 6 G-25/7 G
1 POWDER (GRAM) ORAL 2 TIMES DAILY
Status: DISCONTINUED | OUTPATIENT
Start: 2021-01-01 | End: 2021-01-01

## 2021-01-01 RX ORDER — VALACYCLOVIR HYDROCHLORIDE 500 MG/1
1000 TABLET, FILM COATED ORAL EVERY 12 HOURS SCHEDULED
Status: COMPLETED | OUTPATIENT
Start: 2021-01-01 | End: 2021-01-01

## 2021-01-01 RX ORDER — ALPRAZOLAM 0.5 MG/1
0.5 TABLET ORAL 3 TIMES DAILY PRN
Start: 2021-01-01

## 2021-01-01 RX ORDER — MIDAZOLAM HYDROCHLORIDE 1 MG/ML
0.5 INJECTION INTRAMUSCULAR; INTRAVENOUS
Status: CANCELLED | OUTPATIENT
Start: 2021-01-01

## 2021-01-01 RX ORDER — DROPERIDOL 2.5 MG/ML
0.62 INJECTION, SOLUTION INTRAMUSCULAR; INTRAVENOUS ONCE AS NEEDED
Status: CANCELLED | OUTPATIENT
Start: 2021-01-01

## 2021-01-01 RX ORDER — CETIRIZINE HYDROCHLORIDE 10 MG/1
10 TABLET ORAL DAILY
Status: DISCONTINUED | OUTPATIENT
Start: 2021-01-01 | End: 2021-01-01

## 2021-01-01 RX ORDER — ACETAMINOPHEN 160 MG/5ML
650 SOLUTION ORAL EVERY 4 HOURS PRN
Status: DISCONTINUED | OUTPATIENT
Start: 2021-01-01 | End: 2021-01-01 | Stop reason: HOSPADM

## 2021-01-01 RX ORDER — ALBUTEROL SULFATE 90 UG/1
4 AEROSOL, METERED RESPIRATORY (INHALATION)
Status: DISCONTINUED | OUTPATIENT
Start: 2021-01-01 | End: 2021-01-01 | Stop reason: ALTCHOICE

## 2021-01-01 RX ORDER — NALOXONE HCL 0.4 MG/ML
0.4 VIAL (ML) INJECTION AS NEEDED
Status: CANCELLED | OUTPATIENT
Start: 2021-01-01

## 2021-01-01 RX ORDER — LEVOTHYROXINE SODIUM 0.12 MG/1
100 TABLET ORAL DAILY
COMMUNITY
End: 2021-01-01 | Stop reason: HOSPADM

## 2021-01-01 RX ORDER — AMINO ACIDS/PROTEIN HYDROLYS 15G-100/30
30 LIQUID (ML) ORAL 2 TIMES DAILY
Status: DISCONTINUED | OUTPATIENT
Start: 2021-01-01 | End: 2021-01-01

## 2021-01-01 RX ORDER — MAGNESIUM HYDROXIDE 1200 MG/15ML
LIQUID ORAL AS NEEDED
Status: DISCONTINUED | OUTPATIENT
Start: 2021-01-01 | End: 2021-01-01 | Stop reason: HOSPADM

## 2021-01-01 RX ORDER — OLANZAPINE 5 MG/1
10 TABLET ORAL DAILY
Status: DISCONTINUED | OUTPATIENT
Start: 2021-01-01 | End: 2021-01-01

## 2021-01-01 RX ORDER — DOCUSATE SODIUM 50 MG/5 ML
100 LIQUID (ML) ORAL 2 TIMES DAILY
Start: 2021-01-01

## 2021-01-01 RX ORDER — POLYETHYLENE GLYCOL 3350 17 G/17G
17 POWDER, FOR SOLUTION ORAL DAILY
Start: 2021-01-01

## 2021-01-01 RX ORDER — DEXMEDETOMIDINE HYDROCHLORIDE 4 UG/ML
.2-1.5 INJECTION, SOLUTION INTRAVENOUS
Status: DISCONTINUED | OUTPATIENT
Start: 2021-01-01 | End: 2021-01-01

## 2021-01-01 RX ORDER — BENZONATATE 100 MG/1
100 CAPSULE ORAL 3 TIMES DAILY PRN
Status: DISCONTINUED | OUTPATIENT
Start: 2021-01-01 | End: 2021-01-01

## 2021-01-01 RX ORDER — METHYLPREDNISOLONE SODIUM SUCCINATE 125 MG/2ML
60 INJECTION, POWDER, LYOPHILIZED, FOR SOLUTION INTRAMUSCULAR; INTRAVENOUS EVERY 6 HOURS
Status: DISCONTINUED | OUTPATIENT
Start: 2021-01-01 | End: 2021-01-01

## 2021-01-01 RX ORDER — MIDAZOLAM IN NACL,ISO-OSMOT/PF 50 MG/50ML
1-15 INFUSION BOTTLE (ML) INTRAVENOUS
Status: DISCONTINUED | OUTPATIENT
Start: 2021-01-01 | End: 2021-01-01

## 2021-01-01 RX ORDER — AMOXICILLIN 250 MG
2 CAPSULE ORAL 2 TIMES DAILY
Status: DISCONTINUED | OUTPATIENT
Start: 2021-01-01 | End: 2021-01-01

## 2021-01-01 RX ORDER — LIDOCAINE HYDROCHLORIDE 10 MG/ML
0.5 INJECTION, SOLUTION EPIDURAL; INFILTRATION; INTRACAUDAL; PERINEURAL ONCE AS NEEDED
Status: CANCELLED | OUTPATIENT
Start: 2021-01-01

## 2021-01-01 RX ORDER — QUETIAPINE FUMARATE 100 MG/1
100 TABLET, FILM COATED ORAL EVERY 12 HOURS SCHEDULED
Status: DISCONTINUED | OUTPATIENT
Start: 2021-01-01 | End: 2021-01-01

## 2021-01-01 RX ORDER — PANTOPRAZOLE SODIUM 40 MG/1
40 TABLET, DELAYED RELEASE ORAL
Status: DISCONTINUED | OUTPATIENT
Start: 2021-01-01 | End: 2021-01-01

## 2021-01-01 RX ORDER — ALPRAZOLAM 0.5 MG/1
0.5 TABLET ORAL 3 TIMES DAILY PRN
Status: DISCONTINUED | OUTPATIENT
Start: 2021-01-01 | End: 2021-01-01 | Stop reason: HOSPADM

## 2021-01-01 RX ORDER — ONDANSETRON 4 MG/1
4 TABLET, FILM COATED ORAL EVERY 6 HOURS PRN
Status: DISCONTINUED | OUTPATIENT
Start: 2021-01-01 | End: 2021-01-01

## 2021-01-01 RX ORDER — POTASSIUM CHLORIDE 750 MG/1
40 CAPSULE, EXTENDED RELEASE ORAL AS NEEDED
Status: DISCONTINUED | OUTPATIENT
Start: 2021-01-01 | End: 2021-01-01

## 2021-01-01 RX ORDER — MIDODRINE HYDROCHLORIDE 5 MG/1
5 TABLET ORAL
Status: DISCONTINUED | OUTPATIENT
Start: 2021-01-01 | End: 2021-01-01

## 2021-01-01 RX ORDER — LEVOTHYROXINE SODIUM 20 UG/ML
75 INJECTION, SOLUTION INTRAVENOUS
Status: DISCONTINUED | OUTPATIENT
Start: 2021-01-01 | End: 2021-01-01 | Stop reason: HOSPADM

## 2021-01-01 RX ORDER — MULTIVITAMIN WITH IRON
TABLET ORAL DAILY
COMMUNITY
End: 2021-01-01 | Stop reason: HOSPADM

## 2021-01-01 RX ORDER — ALBUMIN (HUMAN) 12.5 G/50ML
50 SOLUTION INTRAVENOUS ONCE
Status: COMPLETED | OUTPATIENT
Start: 2021-01-01 | End: 2021-01-01

## 2021-01-01 RX ORDER — ACETAMINOPHEN 650 MG/1
650 SUPPOSITORY RECTAL EVERY 4 HOURS PRN
Status: DISCONTINUED | OUTPATIENT
Start: 2021-01-01 | End: 2021-01-01 | Stop reason: HOSPADM

## 2021-01-01 RX ORDER — LISINOPRIL 5 MG/1
5 TABLET ORAL DAILY
Status: DISCONTINUED | OUTPATIENT
Start: 2021-01-01 | End: 2021-01-01

## 2021-01-01 RX ORDER — NICOTINE POLACRILEX 4 MG
15 LOZENGE BUCCAL
Status: DISCONTINUED | OUTPATIENT
Start: 2021-01-01 | End: 2021-01-01

## 2021-01-01 RX ORDER — ACETYLCYSTEINE 200 MG/ML
2 SOLUTION ORAL; RESPIRATORY (INHALATION)
Status: DISCONTINUED | OUTPATIENT
Start: 2021-01-01 | End: 2021-01-01

## 2021-01-01 RX ORDER — PANTOPRAZOLE SODIUM 40 MG/10ML
40 INJECTION, POWDER, LYOPHILIZED, FOR SOLUTION INTRAVENOUS
Status: DISCONTINUED | OUTPATIENT
Start: 2021-01-01 | End: 2021-01-01

## 2021-01-01 RX ORDER — ROPINIROLE 5 MG/1
5 TABLET, FILM COATED ORAL NIGHTLY
COMMUNITY
End: 2021-01-01 | Stop reason: HOSPADM

## 2021-01-01 RX ORDER — NALOXONE HCL 0.4 MG/ML
0.1 VIAL (ML) INJECTION
Status: DISCONTINUED | OUTPATIENT
Start: 2021-01-01 | End: 2021-02-13 | Stop reason: HOSPADM

## 2021-01-01 RX ORDER — ALBUTEROL SULFATE 90 UG/1
4 AEROSOL, METERED RESPIRATORY (INHALATION)
Status: DISCONTINUED | OUTPATIENT
Start: 2021-01-01 | End: 2021-01-01

## 2021-01-01 RX ORDER — ACETAMINOPHEN 650 MG/1
650 SUPPOSITORY RECTAL EVERY 4 HOURS PRN
Status: DISCONTINUED | OUTPATIENT
Start: 2021-01-01 | End: 2021-01-01

## 2021-01-01 RX ORDER — PROMETHAZINE HYDROCHLORIDE 25 MG/1
25 SUPPOSITORY RECTAL ONCE AS NEEDED
Status: CANCELLED | OUTPATIENT
Start: 2021-01-01

## 2021-01-01 RX ORDER — THEOPHYLLINE 300 MG/1
300 TABLET, EXTENDED RELEASE ORAL EVERY 12 HOURS SCHEDULED
Status: DISCONTINUED | OUTPATIENT
Start: 2021-01-01 | End: 2021-01-01

## 2021-01-01 RX ORDER — LORAZEPAM 2 MG/ML
2 INJECTION INTRAMUSCULAR EVERY 4 HOURS
Status: DISCONTINUED | OUTPATIENT
Start: 2021-01-01 | End: 2021-01-01 | Stop reason: HOSPADM

## 2021-01-01 RX ORDER — LEVETIRACETAM 5 MG/ML
500 INJECTION INTRAVASCULAR EVERY 12 HOURS SCHEDULED
Status: DISCONTINUED | OUTPATIENT
Start: 2021-01-01 | End: 2021-01-01

## 2021-01-01 RX ORDER — DEXTROMETHORPHAN POLISTIREX 30 MG/5ML
60 SUSPENSION ORAL EVERY 12 HOURS PRN
Status: DISCONTINUED | OUTPATIENT
Start: 2021-01-01 | End: 2021-01-01

## 2021-01-01 RX ORDER — ACETYLCYSTEINE 200 MG/ML
2 SOLUTION ORAL; RESPIRATORY (INHALATION)
Start: 2021-01-01

## 2021-01-01 RX ORDER — SODIUM CHLORIDE 0.9 % (FLUSH) 0.9 %
3 SYRINGE (ML) INJECTION EVERY 12 HOURS SCHEDULED
Status: CANCELLED | OUTPATIENT
Start: 2021-01-01

## 2021-01-01 RX ORDER — PANTOPRAZOLE SODIUM 40 MG/10ML
40 INJECTION, POWDER, LYOPHILIZED, FOR SOLUTION INTRAVENOUS EVERY 12 HOURS SCHEDULED
Start: 2021-01-01

## 2021-01-01 RX ORDER — MORPHINE SULFATE 1 MG/ML
INJECTION INTRAVENOUS CONTINUOUS
Status: DISCONTINUED | OUTPATIENT
Start: 2021-01-01 | End: 2021-02-13 | Stop reason: HOSPADM

## 2021-01-01 RX ORDER — BISACODYL 10 MG
10 SUPPOSITORY, RECTAL RECTAL DAILY PRN
Status: DISCONTINUED | OUTPATIENT
Start: 2021-01-01 | End: 2021-01-01 | Stop reason: HOSPADM

## 2021-01-01 RX ORDER — ALBUTEROL SULFATE 2.5 MG/3ML
2.5 SOLUTION RESPIRATORY (INHALATION)
Refills: 12
Start: 2021-01-01

## 2021-01-01 RX ORDER — LORAZEPAM 2 MG/ML
2 INJECTION INTRAMUSCULAR EVERY 4 HOURS
Start: 2021-01-01

## 2021-01-01 RX ORDER — DEXTROSE MONOHYDRATE 25 G/50ML
25 INJECTION, SOLUTION INTRAVENOUS ONCE
Status: COMPLETED | OUTPATIENT
Start: 2021-01-01 | End: 2021-01-01

## 2021-01-01 RX ORDER — CALCIUM CARBONATE 750 MG/1
1500 TABLET, CHEWABLE ORAL 2 TIMES DAILY PRN
Status: DISCONTINUED | OUTPATIENT
Start: 2021-01-01 | End: 2021-01-01

## 2021-01-01 RX ORDER — HYDROCODONE BITARTRATE AND ACETAMINOPHEN 5; 325 MG/1; MG/1
1 TABLET ORAL ONCE AS NEEDED
Status: CANCELLED | OUTPATIENT
Start: 2021-01-01 | End: 2021-01-01

## 2021-01-01 RX ORDER — ONDANSETRON 2 MG/ML
4 INJECTION INTRAMUSCULAR; INTRAVENOUS ONCE AS NEEDED
Status: CANCELLED | OUTPATIENT
Start: 2021-01-01

## 2021-01-01 RX ORDER — LANSOPRAZOLE
30 KIT EVERY MORNING
Status: DISCONTINUED | OUTPATIENT
Start: 2021-02-13 | End: 2021-01-01

## 2021-01-01 RX ADMIN — CHLORHEXIDINE GLUCONATE 0.12% ORAL RINSE 15 ML: 1.2 LIQUID ORAL at 20:04

## 2021-01-01 RX ADMIN — BUDESONIDE AND FORMOTEROL FUMARATE DIHYDRATE 2 PUFF: 160; 4.5 AEROSOL RESPIRATORY (INHALATION) at 07:14

## 2021-01-01 RX ADMIN — ALBUTEROL SULFATE 4 PUFF: 90 AEROSOL, METERED RESPIRATORY (INHALATION) at 19:41

## 2021-01-01 RX ADMIN — MIDODRINE HYDROCHLORIDE 5 MG: 5 TABLET ORAL at 17:58

## 2021-01-01 RX ADMIN — CHLORHEXIDINE GLUCONATE 0.12% ORAL RINSE 15 ML: 1.2 LIQUID ORAL at 09:21

## 2021-01-01 RX ADMIN — DOCUSATE SODIUM 100 MG: 50 LIQUID ORAL at 22:00

## 2021-01-01 RX ADMIN — DIAZEPAM 5 MG: 5 TABLET ORAL at 20:22

## 2021-01-01 RX ADMIN — DEXMEDETOMIDINE HYDROCHLORIDE 0.2 MCG/KG/HR: 4 INJECTION, SOLUTION INTRAVENOUS at 12:16

## 2021-01-01 RX ADMIN — ALBUTEROL SULFATE 2 PUFF: 90 AEROSOL, METERED RESPIRATORY (INHALATION) at 16:10

## 2021-01-01 RX ADMIN — INSULIN DETEMIR 10 UNITS: 100 INJECTION, SOLUTION SUBCUTANEOUS at 08:35

## 2021-01-01 RX ADMIN — ENOXAPARIN SODIUM 40 MG: 40 INJECTION SUBCUTANEOUS at 08:49

## 2021-01-01 RX ADMIN — DIAZEPAM 5 MG: 5 TABLET ORAL at 21:00

## 2021-01-01 RX ADMIN — PANTOPRAZOLE SODIUM 40 MG: 40 INJECTION, POWDER, LYOPHILIZED, FOR SOLUTION INTRAVENOUS at 20:07

## 2021-01-01 RX ADMIN — MIDODRINE HYDROCHLORIDE 5 MG: 5 TABLET ORAL at 06:31

## 2021-01-01 RX ADMIN — CEFTAZIDIME 2 G: 2 INJECTION, POWDER, FOR SOLUTION INTRAVENOUS at 06:54

## 2021-01-01 RX ADMIN — BUDESONIDE AND FORMOTEROL FUMARATE DIHYDRATE 2 PUFF: 160; 4.5 AEROSOL RESPIRATORY (INHALATION) at 07:00

## 2021-01-01 RX ADMIN — INSULIN HUMAN 2 UNITS: 100 INJECTION, SOLUTION PARENTERAL at 00:14

## 2021-01-01 RX ADMIN — ALBUTEROL SULFATE 4 PUFF: 90 AEROSOL, METERED RESPIRATORY (INHALATION) at 01:40

## 2021-01-01 RX ADMIN — Medication 0.16 MCG/KG/MIN: at 20:52

## 2021-01-01 RX ADMIN — PANTOPRAZOLE SODIUM 40 MG: 40 INJECTION, POWDER, LYOPHILIZED, FOR SOLUTION INTRAVENOUS at 20:33

## 2021-01-01 RX ADMIN — DEXAMETHASONE SODIUM PHOSPHATE 6 MG: 4 INJECTION, SOLUTION INTRA-ARTICULAR; INTRALESIONAL; INTRAMUSCULAR; INTRAVENOUS; SOFT TISSUE at 09:02

## 2021-01-01 RX ADMIN — MICAFUNGIN SODIUM 100 MG: 100 INJECTION, POWDER, LYOPHILIZED, FOR SOLUTION INTRAVENOUS at 21:00

## 2021-01-01 RX ADMIN — PROPOFOL 40 MCG/KG/MIN: 10 INJECTION, EMULSION INTRAVENOUS at 06:11

## 2021-01-01 RX ADMIN — QUETIAPINE FUMARATE 50 MG: 25 TABLET ORAL at 08:25

## 2021-01-01 RX ADMIN — IOPAMIDOL 100 ML: 755 INJECTION, SOLUTION INTRAVENOUS at 15:00

## 2021-01-01 RX ADMIN — INSULIN HUMAN 5 UNITS: 100 INJECTION, SOLUTION PARENTERAL at 12:11

## 2021-01-01 RX ADMIN — THEOPHYLLINE 300 MG: 300 TABLET, EXTENDED RELEASE ORAL at 08:55

## 2021-01-01 RX ADMIN — REMDESIVIR 100 MG: 100 INJECTION, POWDER, LYOPHILIZED, FOR SOLUTION INTRAVENOUS at 11:29

## 2021-01-01 RX ADMIN — DIAZEPAM 10 MG: 5 INJECTION, SOLUTION INTRAMUSCULAR; INTRAVENOUS at 22:17

## 2021-01-01 RX ADMIN — VECURONIUM BROMIDE 10 MG: 1 INJECTION, POWDER, LYOPHILIZED, FOR SOLUTION INTRAVENOUS at 23:48

## 2021-01-01 RX ADMIN — BUMETANIDE 1 MG/HR: 0.25 INJECTION INTRAMUSCULAR; INTRAVENOUS at 10:54

## 2021-01-01 RX ADMIN — FENTANYL CITRATE 300 MCG/HR: 0.05 INJECTION, SOLUTION INTRAMUSCULAR; INTRAVENOUS at 18:16

## 2021-01-01 RX ADMIN — ALBUTEROL SULFATE 4 PUFF: 90 AEROSOL, METERED RESPIRATORY (INHALATION) at 12:39

## 2021-01-01 RX ADMIN — BUDESONIDE AND FORMOTEROL FUMARATE DIHYDRATE 2 PUFF: 160; 4.5 AEROSOL RESPIRATORY (INHALATION) at 08:56

## 2021-01-01 RX ADMIN — LORAZEPAM 2 MG: 2 INJECTION INTRAMUSCULAR; INTRAVENOUS at 04:00

## 2021-01-01 RX ADMIN — INSULIN HUMAN 4 UNITS: 100 INJECTION, SOLUTION PARENTERAL at 23:39

## 2021-01-01 RX ADMIN — PROPOFOL 25 MCG/KG/MIN: 10 INJECTION, EMULSION INTRAVENOUS at 21:40

## 2021-01-01 RX ADMIN — INSULIN HUMAN 12 UNITS: 100 INJECTION, SOLUTION PARENTERAL at 05:28

## 2021-01-01 RX ADMIN — LEVOFLOXACIN 500 MG: 5 INJECTION, SOLUTION INTRAVENOUS at 14:36

## 2021-01-01 RX ADMIN — LORAZEPAM 1 MG: 2 INJECTION INTRAMUSCULAR; INTRAVENOUS at 02:30

## 2021-01-01 RX ADMIN — SENNOSIDES 10 ML: 8.8 LIQUID ORAL at 10:38

## 2021-01-01 RX ADMIN — LORAZEPAM 2 MG: 2 INJECTION INTRAMUSCULAR; INTRAVENOUS at 21:01

## 2021-01-01 RX ADMIN — IPRATROPIUM BROMIDE AND ALBUTEROL SULFATE 3 ML: 2.5; .5 SOLUTION RESPIRATORY (INHALATION) at 19:47

## 2021-01-01 RX ADMIN — ENOXAPARIN SODIUM 40 MG: 40 INJECTION SUBCUTANEOUS at 20:47

## 2021-01-01 RX ADMIN — SODIUM CHLORIDE, PRESERVATIVE FREE 10 ML: 5 INJECTION INTRAVENOUS at 08:26

## 2021-01-01 RX ADMIN — CHLORHEXIDINE GLUCONATE 0.12% ORAL RINSE 15 ML: 1.2 LIQUID ORAL at 21:21

## 2021-01-01 RX ADMIN — CEFTAZIDIME 1 G: 1 INJECTION, POWDER, FOR SOLUTION INTRAMUSCULAR; INTRAVENOUS at 16:27

## 2021-01-01 RX ADMIN — FENTANYL CITRATE 400 MCG/HR: 0.05 INJECTION, SOLUTION INTRAMUSCULAR; INTRAVENOUS at 00:52

## 2021-01-01 RX ADMIN — ENOXAPARIN SODIUM 40 MG: 40 INJECTION SUBCUTANEOUS at 11:36

## 2021-01-01 RX ADMIN — CEFTAZIDIME 2 G: 2 INJECTION, POWDER, FOR SOLUTION INTRAVENOUS at 05:47

## 2021-01-01 RX ADMIN — INSULIN HUMAN 4 UNITS: 100 INJECTION, SOLUTION PARENTERAL at 06:11

## 2021-01-01 RX ADMIN — INSULIN HUMAN 4 UNITS: 100 INJECTION, SOLUTION PARENTERAL at 11:53

## 2021-01-01 RX ADMIN — ALBUTEROL SULFATE 4 PUFF: 90 AEROSOL, METERED RESPIRATORY (INHALATION) at 18:53

## 2021-01-01 RX ADMIN — TAZOBACTAM SODIUM AND PIPERACILLIN SODIUM 3.38 G: 375; 3 INJECTION, SOLUTION INTRAVENOUS at 16:29

## 2021-01-01 RX ADMIN — FENTANYL CITRATE 100 MCG/HR: 0.05 INJECTION, SOLUTION INTRAMUSCULAR; INTRAVENOUS at 05:56

## 2021-01-01 RX ADMIN — Medication 0.02 MCG/KG/MIN: at 05:45

## 2021-01-01 RX ADMIN — QUETIAPINE 50 MG: 25 TABLET, FILM COATED ORAL at 09:06

## 2021-01-01 RX ADMIN — ACETAMINOPHEN ORAL SOLUTION 650 MG: 650 SOLUTION ORAL at 18:37

## 2021-01-01 RX ADMIN — LEVOTHYROXINE SODIUM ANHYDROUS 75 MCG: 100 INJECTION, POWDER, LYOPHILIZED, FOR SOLUTION INTRAVENOUS at 11:57

## 2021-01-01 RX ADMIN — LORAZEPAM 1 MG: 2 INJECTION INTRAMUSCULAR; INTRAVENOUS at 08:13

## 2021-01-01 RX ADMIN — ENOXAPARIN SODIUM 40 MG: 40 INJECTION SUBCUTANEOUS at 08:03

## 2021-01-01 RX ADMIN — ALBUTEROL SULFATE 2 PUFF: 90 AEROSOL, METERED RESPIRATORY (INHALATION) at 20:03

## 2021-01-01 RX ADMIN — CEFEPIME 1 G: 1 INJECTION, POWDER, FOR SOLUTION INTRAMUSCULAR; INTRAVENOUS at 00:28

## 2021-01-01 RX ADMIN — METHYLPREDNISOLONE SODIUM SUCCINATE 40 MG: 40 INJECTION, POWDER, LYOPHILIZED, FOR SOLUTION INTRAMUSCULAR; INTRAVENOUS at 21:59

## 2021-01-01 RX ADMIN — INSULIN HUMAN 4 UNITS: 100 INJECTION, SOLUTION PARENTERAL at 03:15

## 2021-01-01 RX ADMIN — PROPOFOL 5 MCG/KG/MIN: 10 INJECTION, EMULSION INTRAVENOUS at 01:50

## 2021-01-01 RX ADMIN — DOCUSATE SODIUM 100 MG: 50 LIQUID ORAL at 21:21

## 2021-01-01 RX ADMIN — ALBUMIN HUMAN 500 ML: 0.05 INJECTION, SOLUTION INTRAVENOUS at 22:42

## 2021-01-01 RX ADMIN — ALBUMIN HUMAN 500 ML: 0.05 INJECTION, SOLUTION INTRAVENOUS at 14:44

## 2021-01-01 RX ADMIN — ALBUTEROL SULFATE 4 PUFF: 90 AEROSOL, METERED RESPIRATORY (INHALATION) at 12:32

## 2021-01-01 RX ADMIN — IPRATROPIUM BROMIDE AND ALBUTEROL SULFATE 3 ML: 2.5; .5 SOLUTION RESPIRATORY (INHALATION) at 03:40

## 2021-01-01 RX ADMIN — ALBUTEROL SULFATE 4 PUFF: 90 AEROSOL, METERED RESPIRATORY (INHALATION) at 19:48

## 2021-01-01 RX ADMIN — METHYLPREDNISOLONE SODIUM SUCCINATE 40 MG: 40 INJECTION, POWDER, LYOPHILIZED, FOR SOLUTION INTRAMUSCULAR; INTRAVENOUS at 15:13

## 2021-01-01 RX ADMIN — MICAFUNGIN SODIUM 100 MG: 100 INJECTION, POWDER, LYOPHILIZED, FOR SOLUTION INTRAVENOUS at 21:13

## 2021-01-01 RX ADMIN — Medication 15 MG/HR: at 02:35

## 2021-01-01 RX ADMIN — PANTOPRAZOLE SODIUM 40 MG: 40 INJECTION, POWDER, LYOPHILIZED, FOR SOLUTION INTRAVENOUS at 20:58

## 2021-01-01 RX ADMIN — ALBUTEROL SULFATE 4 PUFF: 90 AEROSOL, METERED RESPIRATORY (INHALATION) at 13:29

## 2021-01-01 RX ADMIN — INSULIN DETEMIR 20 UNITS: 100 INJECTION, SOLUTION SUBCUTANEOUS at 08:21

## 2021-01-01 RX ADMIN — INSULIN DETEMIR 20 UNITS: 100 INJECTION, SOLUTION SUBCUTANEOUS at 20:30

## 2021-01-01 RX ADMIN — INSULIN HUMAN 16 UNITS: 100 INJECTION, SOLUTION PARENTERAL at 08:20

## 2021-01-01 RX ADMIN — ALBUMIN HUMAN 12.5 G: 0.25 SOLUTION INTRAVENOUS at 10:54

## 2021-01-01 RX ADMIN — ENOXAPARIN SODIUM 40 MG: 40 INJECTION SUBCUTANEOUS at 10:39

## 2021-01-01 RX ADMIN — TAZOBACTAM SODIUM AND PIPERACILLIN SODIUM 3.38 G: 375; 3 INJECTION, SOLUTION INTRAVENOUS at 00:34

## 2021-01-01 RX ADMIN — ACETYLCYSTEINE 2 ML: 200 SOLUTION ORAL; RESPIRATORY (INHALATION) at 18:56

## 2021-01-01 RX ADMIN — QUETIAPINE 50 MG: 25 TABLET, FILM COATED ORAL at 08:05

## 2021-01-01 RX ADMIN — MICAFUNGIN SODIUM 100 MG: 100 INJECTION, POWDER, LYOPHILIZED, FOR SOLUTION INTRAVENOUS at 12:08

## 2021-01-01 RX ADMIN — ROPINIROLE HYDROCHLORIDE 4 MG: 2 TABLET, FILM COATED ORAL at 21:36

## 2021-01-01 RX ADMIN — DIAZEPAM 5 MG: 5 TABLET ORAL at 18:20

## 2021-01-01 RX ADMIN — LORAZEPAM 0.5 MG: 2 INJECTION INTRAMUSCULAR; INTRAVENOUS at 09:14

## 2021-01-01 RX ADMIN — Medication 1 PACKET: at 20:59

## 2021-01-01 RX ADMIN — IPRATROPIUM BROMIDE AND ALBUTEROL SULFATE 3 ML: 2.5; .5 SOLUTION RESPIRATORY (INHALATION) at 15:33

## 2021-01-01 RX ADMIN — ENOXAPARIN SODIUM 40 MG: 40 INJECTION SUBCUTANEOUS at 20:41

## 2021-01-01 RX ADMIN — ALBUTEROL SULFATE 4 PUFF: 90 AEROSOL, METERED RESPIRATORY (INHALATION) at 06:59

## 2021-01-01 RX ADMIN — ROPINIROLE HYDROCHLORIDE 4 MG: 2 TABLET, FILM COATED ORAL at 20:32

## 2021-01-01 RX ADMIN — LORAZEPAM 0.5 MG: 2 INJECTION INTRAMUSCULAR; INTRAVENOUS at 21:36

## 2021-01-01 RX ADMIN — INSULIN HUMAN 5 UNITS: 100 INJECTION, SOLUTION PARENTERAL at 18:16

## 2021-01-01 RX ADMIN — INSULIN DETEMIR 20 UNITS: 100 INJECTION, SOLUTION SUBCUTANEOUS at 09:12

## 2021-01-01 RX ADMIN — INSULIN HUMAN 3 UNITS: 100 INJECTION, SOLUTION PARENTERAL at 17:54

## 2021-01-01 RX ADMIN — TAZOBACTAM SODIUM AND PIPERACILLIN SODIUM 3.38 G: 375; 3 INJECTION, SOLUTION INTRAVENOUS at 10:22

## 2021-01-01 RX ADMIN — INSULIN HUMAN 5 UNITS: 100 INJECTION, SOLUTION PARENTERAL at 00:46

## 2021-01-01 RX ADMIN — ALBUMIN HUMAN 500 ML: 0.05 INJECTION, SOLUTION INTRAVENOUS at 01:51

## 2021-01-01 RX ADMIN — PANTOPRAZOLE SODIUM 40 MG: 40 INJECTION, POWDER, LYOPHILIZED, FOR SOLUTION INTRAVENOUS at 20:22

## 2021-01-01 RX ADMIN — LORAZEPAM 1 MG: 2 INJECTION INTRAMUSCULAR; INTRAVENOUS at 20:51

## 2021-01-01 RX ADMIN — Medication 80 MCG: at 13:27

## 2021-01-01 RX ADMIN — INSULIN DETEMIR 10 UNITS: 100 INJECTION, SOLUTION SUBCUTANEOUS at 22:02

## 2021-01-01 RX ADMIN — BUDESONIDE AND FORMOTEROL FUMARATE DIHYDRATE 2 PUFF: 160; 4.5 AEROSOL RESPIRATORY (INHALATION) at 19:00

## 2021-01-01 RX ADMIN — INSULIN DETEMIR 20 UNITS: 100 INJECTION, SOLUTION SUBCUTANEOUS at 09:14

## 2021-01-01 RX ADMIN — LEVOTHYROXINE SODIUM ANHYDROUS 100 MCG: 100 INJECTION, POWDER, LYOPHILIZED, FOR SOLUTION INTRAVENOUS at 12:06

## 2021-01-01 RX ADMIN — SODIUM CHLORIDE, PRESERVATIVE FREE 10 ML: 5 INJECTION INTRAVENOUS at 20:42

## 2021-01-01 RX ADMIN — QUETIAPINE 50 MG: 25 TABLET, FILM COATED ORAL at 08:15

## 2021-01-01 RX ADMIN — ALBUTEROL SULFATE 4 PUFF: 90 AEROSOL, METERED RESPIRATORY (INHALATION) at 12:30

## 2021-01-01 RX ADMIN — LORAZEPAM 1 MG: 2 INJECTION INTRAMUSCULAR; INTRAVENOUS at 09:11

## 2021-01-01 RX ADMIN — INSULIN HUMAN 3 UNITS: 100 INJECTION, SOLUTION PARENTERAL at 17:22

## 2021-01-01 RX ADMIN — PANTOPRAZOLE SODIUM 40 MG: 40 INJECTION, POWDER, LYOPHILIZED, FOR SOLUTION INTRAVENOUS at 08:13

## 2021-01-01 RX ADMIN — INSULIN HUMAN 5 UNITS: 100 INJECTION, SOLUTION PARENTERAL at 05:23

## 2021-01-01 RX ADMIN — LEVOTHYROXINE SODIUM ANHYDROUS 75 MCG: 100 INJECTION, POWDER, LYOPHILIZED, FOR SOLUTION INTRAVENOUS at 11:47

## 2021-01-01 RX ADMIN — CEFTAZIDIME 2 G: 2 INJECTION, POWDER, FOR SOLUTION INTRAVENOUS at 12:45

## 2021-01-01 RX ADMIN — BARICITINIB 4 MG: 2 TABLET, FILM COATED ORAL at 15:42

## 2021-01-01 RX ADMIN — INSULIN HUMAN 8 UNITS: 100 INJECTION, SOLUTION PARENTERAL at 12:09

## 2021-01-01 RX ADMIN — ENOXAPARIN SODIUM 40 MG: 40 INJECTION SUBCUTANEOUS at 09:08

## 2021-01-01 RX ADMIN — PANTOPRAZOLE SODIUM 40 MG: 40 INJECTION, POWDER, LYOPHILIZED, FOR SOLUTION INTRAVENOUS at 08:20

## 2021-01-01 RX ADMIN — DIAZEPAM 5 MG: 5 TABLET ORAL at 16:23

## 2021-01-01 RX ADMIN — POLYETHYLENE GLYCOL 3350 17 G: 17 POWDER, FOR SOLUTION ORAL at 08:24

## 2021-01-01 RX ADMIN — CHLORHEXIDINE GLUCONATE 0.12% ORAL RINSE 15 ML: 1.2 LIQUID ORAL at 21:55

## 2021-01-01 RX ADMIN — SODIUM CHLORIDE, PRESERVATIVE FREE 10 ML: 5 INJECTION INTRAVENOUS at 21:15

## 2021-01-01 RX ADMIN — ACETYLCYSTEINE 2 ML: 200 SOLUTION ORAL; RESPIRATORY (INHALATION) at 07:42

## 2021-01-01 RX ADMIN — ALBUTEROL SULFATE 4 PUFF: 90 AEROSOL, METERED RESPIRATORY (INHALATION) at 00:32

## 2021-01-01 RX ADMIN — ALBUTEROL SULFATE 4 PUFF: 90 AEROSOL, METERED RESPIRATORY (INHALATION) at 12:45

## 2021-01-01 RX ADMIN — LEVOTHYROXINE SODIUM ANHYDROUS 75 MCG: 100 INJECTION, POWDER, LYOPHILIZED, FOR SOLUTION INTRAVENOUS at 10:54

## 2021-01-01 RX ADMIN — SODIUM CHLORIDE, PRESERVATIVE FREE 10 ML: 5 INJECTION INTRAVENOUS at 08:59

## 2021-01-01 RX ADMIN — SODIUM CHLORIDE, POTASSIUM CHLORIDE, SODIUM LACTATE AND CALCIUM CHLORIDE 75 ML/HR: 600; 310; 30; 20 INJECTION, SOLUTION INTRAVENOUS at 16:12

## 2021-01-01 RX ADMIN — SODIUM CHLORIDE, PRESERVATIVE FREE 10 ML: 5 INJECTION INTRAVENOUS at 20:48

## 2021-01-01 RX ADMIN — INSULIN HUMAN 5 UNITS: 100 INJECTION, SOLUTION PARENTERAL at 18:05

## 2021-01-01 RX ADMIN — ACETAMINOPHEN ORAL SOLUTION 650 MG: 650 SOLUTION ORAL at 20:38

## 2021-01-01 RX ADMIN — CHLORHEXIDINE GLUCONATE 0.12% ORAL RINSE 15 ML: 1.2 LIQUID ORAL at 08:05

## 2021-01-01 RX ADMIN — INSULIN HUMAN 16 UNITS: 100 INJECTION, SOLUTION PARENTERAL at 02:53

## 2021-01-01 RX ADMIN — BUMETANIDE 2 MG: 0.25 INJECTION INTRAMUSCULAR; INTRAVENOUS at 10:54

## 2021-01-01 RX ADMIN — CHLORHEXIDINE GLUCONATE 0.12% ORAL RINSE 15 ML: 1.2 LIQUID ORAL at 20:44

## 2021-01-01 RX ADMIN — IPRATROPIUM BROMIDE AND ALBUTEROL SULFATE 3 ML: 2.5; .5 SOLUTION RESPIRATORY (INHALATION) at 19:15

## 2021-01-01 RX ADMIN — CEFTAZIDIME 2 G: 2 INJECTION, POWDER, FOR SOLUTION INTRAVENOUS at 03:28

## 2021-01-01 RX ADMIN — PROPOFOL 5 MCG/KG/MIN: 10 INJECTION, EMULSION INTRAVENOUS at 04:05

## 2021-01-01 RX ADMIN — MIDODRINE HYDROCHLORIDE 5 MG: 5 TABLET ORAL at 08:38

## 2021-01-01 RX ADMIN — ALBUTEROL SULFATE 4 PUFF: 90 AEROSOL, METERED RESPIRATORY (INHALATION) at 19:05

## 2021-01-01 RX ADMIN — Medication: at 04:44

## 2021-01-01 RX ADMIN — SODIUM CHLORIDE, PRESERVATIVE FREE 10 ML: 5 INJECTION INTRAVENOUS at 08:51

## 2021-01-01 RX ADMIN — CEFTAZIDIME 2 G: 2 INJECTION, POWDER, FOR SOLUTION INTRAVENOUS at 11:39

## 2021-01-01 RX ADMIN — CEFEPIME 1 G: 1 INJECTION, POWDER, FOR SOLUTION INTRAMUSCULAR; INTRAVENOUS at 12:56

## 2021-01-01 RX ADMIN — CHLORHEXIDINE GLUCONATE 0.12% ORAL RINSE 15 ML: 1.2 LIQUID ORAL at 20:07

## 2021-01-01 RX ADMIN — Medication: at 09:16

## 2021-01-01 RX ADMIN — PROPOFOL 5 MCG/KG/MIN: 10 INJECTION, EMULSION INTRAVENOUS at 11:16

## 2021-01-01 RX ADMIN — CHLORHEXIDINE GLUCONATE 0.12% ORAL RINSE 15 ML: 1.2 LIQUID ORAL at 08:38

## 2021-01-01 RX ADMIN — INSULIN HUMAN 4 UNITS: 100 INJECTION, SOLUTION PARENTERAL at 12:06

## 2021-01-01 RX ADMIN — LEVOFLOXACIN 500 MG: 5 INJECTION, SOLUTION INTRAVENOUS at 14:12

## 2021-01-01 RX ADMIN — PANTOPRAZOLE SODIUM 40 MG: 40 INJECTION, POWDER, FOR SOLUTION INTRAVENOUS at 05:59

## 2021-01-01 RX ADMIN — VECURONIUM BROMIDE 10 MG: 1 INJECTION, POWDER, LYOPHILIZED, FOR SOLUTION INTRAVENOUS at 10:22

## 2021-01-01 RX ADMIN — MIDODRINE HYDROCHLORIDE 5 MG: 5 TABLET ORAL at 08:27

## 2021-01-01 RX ADMIN — SODIUM CHLORIDE, PRESERVATIVE FREE 10 ML: 5 INJECTION INTRAVENOUS at 21:37

## 2021-01-01 RX ADMIN — PROPOFOL 15 MCG/KG/MIN: 10 INJECTION, EMULSION INTRAVENOUS at 00:16

## 2021-01-01 RX ADMIN — MICAFUNGIN SODIUM 100 MG: 100 INJECTION, POWDER, LYOPHILIZED, FOR SOLUTION INTRAVENOUS at 11:22

## 2021-01-01 RX ADMIN — ALBUTEROL SULFATE 4 PUFF: 90 AEROSOL, METERED RESPIRATORY (INHALATION) at 08:00

## 2021-01-01 RX ADMIN — PANTOPRAZOLE SODIUM 40 MG: 40 INJECTION, POWDER, FOR SOLUTION INTRAVENOUS at 06:11

## 2021-01-01 RX ADMIN — FENTANYL CITRATE 400 MCG/HR: 0.05 INJECTION, SOLUTION INTRAMUSCULAR; INTRAVENOUS at 01:56

## 2021-01-01 RX ADMIN — ALBUTEROL SULFATE 4 PUFF: 90 AEROSOL, METERED RESPIRATORY (INHALATION) at 19:36

## 2021-01-01 RX ADMIN — THEOPHYLLINE 300 MG: 300 TABLET, EXTENDED RELEASE ORAL at 20:02

## 2021-01-01 RX ADMIN — TAZOBACTAM SODIUM AND PIPERACILLIN SODIUM 3.38 G: 375; 3 INJECTION, SOLUTION INTRAVENOUS at 00:25

## 2021-01-01 RX ADMIN — CEFTAZIDIME 2 G: 2 INJECTION, POWDER, FOR SOLUTION INTRAVENOUS at 13:49

## 2021-01-01 RX ADMIN — SODIUM CHLORIDE, PRESERVATIVE FREE 10 ML: 5 INJECTION INTRAVENOUS at 09:21

## 2021-01-01 RX ADMIN — CEFTAZIDIME 2 G: 2 INJECTION, POWDER, FOR SOLUTION INTRAVENOUS at 22:01

## 2021-01-01 RX ADMIN — QUETIAPINE 50 MG: 25 TABLET, FILM COATED ORAL at 20:42

## 2021-01-01 RX ADMIN — DIAZEPAM 5 MG: 5 TABLET ORAL at 13:44

## 2021-01-01 RX ADMIN — CEFTAZIDIME 2 G: 2 INJECTION, POWDER, FOR SOLUTION INTRAVENOUS at 03:55

## 2021-01-01 RX ADMIN — DOCUSATE SODIUM 50MG AND SENNOSIDES 8.6MG 2 TABLET: 8.6; 5 TABLET, FILM COATED ORAL at 08:17

## 2021-01-01 RX ADMIN — NEOSTIGMINE 2 MG: 1 INJECTION INTRAVENOUS at 13:41

## 2021-01-01 RX ADMIN — INSULIN HUMAN 20 UNITS: 100 INJECTION, SOLUTION PARENTERAL at 00:33

## 2021-01-01 RX ADMIN — ALBUTEROL SULFATE 4 PUFF: 90 AEROSOL, METERED RESPIRATORY (INHALATION) at 19:07

## 2021-01-01 RX ADMIN — ACETAMINOPHEN ORAL SOLUTION 650 MG: 650 SOLUTION ORAL at 20:49

## 2021-01-01 RX ADMIN — INSULIN HUMAN 8 UNITS: 100 INJECTION, SOLUTION PARENTERAL at 14:28

## 2021-01-01 RX ADMIN — INSULIN HUMAN 5 UNITS: 100 INJECTION, SOLUTION PARENTERAL at 23:52

## 2021-01-01 RX ADMIN — QUETIAPINE 50 MG: 25 TABLET, FILM COATED ORAL at 20:22

## 2021-01-01 RX ADMIN — ACETAMINOPHEN ORAL SOLUTION 650 MG: 650 SOLUTION ORAL at 21:32

## 2021-01-01 RX ADMIN — FENTANYL CITRATE 400 MCG/HR: 0.05 INJECTION, SOLUTION INTRAMUSCULAR; INTRAVENOUS at 11:13

## 2021-01-01 RX ADMIN — QUETIAPINE 50 MG: 25 TABLET, FILM COATED ORAL at 21:55

## 2021-01-01 RX ADMIN — OXYCODONE 10 MG: 5 TABLET ORAL at 21:21

## 2021-01-01 RX ADMIN — ALPRAZOLAM 0.5 MG: 0.5 TABLET ORAL at 23:48

## 2021-01-01 RX ADMIN — ALBUTEROL SULFATE 4 PUFF: 90 AEROSOL, METERED RESPIRATORY (INHALATION) at 20:00

## 2021-01-01 RX ADMIN — PANTOPRAZOLE SODIUM 40 MG: 40 INJECTION, POWDER, LYOPHILIZED, FOR SOLUTION INTRAVENOUS at 20:09

## 2021-01-01 RX ADMIN — Medication: at 17:52

## 2021-01-01 RX ADMIN — MIDODRINE HYDROCHLORIDE 5 MG: 5 TABLET ORAL at 08:59

## 2021-01-01 RX ADMIN — ALPRAZOLAM 0.5 MG: 0.5 TABLET ORAL at 20:34

## 2021-01-01 RX ADMIN — ALBUTEROL SULFATE 4 PUFF: 90 AEROSOL, METERED RESPIRATORY (INHALATION) at 18:54

## 2021-01-01 RX ADMIN — BUMETANIDE 1 MG: 0.25 INJECTION, SOLUTION INTRAMUSCULAR; INTRAVENOUS at 05:31

## 2021-01-01 RX ADMIN — INSULIN HUMAN 4 UNITS: 100 INJECTION, SOLUTION PARENTERAL at 15:30

## 2021-01-01 RX ADMIN — LORAZEPAM 2 MG: 2 INJECTION INTRAMUSCULAR; INTRAVENOUS at 00:11

## 2021-01-01 RX ADMIN — INSULIN DETEMIR 20 UNITS: 100 INJECTION, SOLUTION SUBCUTANEOUS at 09:02

## 2021-01-01 RX ADMIN — INSULIN DETEMIR 20 UNITS: 100 INJECTION, SOLUTION SUBCUTANEOUS at 20:07

## 2021-01-01 RX ADMIN — ALBUTEROL SULFATE 4 PUFF: 90 AEROSOL, METERED RESPIRATORY (INHALATION) at 06:58

## 2021-01-01 RX ADMIN — ROPINIROLE HYDROCHLORIDE 4 MG: 2 TABLET, FILM COATED ORAL at 20:03

## 2021-01-01 RX ADMIN — MAGNESIUM OXIDE TAB 400 MG (241.3 MG ELEMENTAL MG) 400 MG: 400 (241.3 MG) TAB at 08:03

## 2021-01-01 RX ADMIN — TAZOBACTAM SODIUM AND PIPERACILLIN SODIUM 3.38 G: 375; 3 INJECTION, SOLUTION INTRAVENOUS at 15:56

## 2021-01-01 RX ADMIN — PHENYLEPHRINE HYDROCHLORIDE 3 MCG/KG/MIN: 10 INJECTION INTRAVENOUS at 15:56

## 2021-01-01 RX ADMIN — FENTANYL CITRATE 350 MCG/HR: 0.05 INJECTION, SOLUTION INTRAMUSCULAR; INTRAVENOUS at 01:40

## 2021-01-01 RX ADMIN — ALBUTEROL SULFATE 4 PUFF: 90 AEROSOL, METERED RESPIRATORY (INHALATION) at 12:40

## 2021-01-01 RX ADMIN — HYDROCODONE BITARTRATE AND ACETAMINOPHEN 1 TABLET: 5; 325 TABLET ORAL at 15:43

## 2021-01-01 RX ADMIN — VALACYCLOVIR HYDROCHLORIDE 1000 MG: 500 TABLET, FILM COATED ORAL at 08:54

## 2021-01-01 RX ADMIN — INSULIN HUMAN 12 UNITS: 100 INJECTION, SOLUTION PARENTERAL at 15:22

## 2021-01-01 RX ADMIN — POLYETHYLENE GLYCOL 3350 17 G: 17 POWDER, FOR SOLUTION ORAL at 11:57

## 2021-01-01 RX ADMIN — LORAZEPAM 1 MG: 2 INJECTION INTRAMUSCULAR; INTRAVENOUS at 16:23

## 2021-01-01 RX ADMIN — LEVOTHYROXINE SODIUM ANHYDROUS 75 MCG: 100 INJECTION, POWDER, LYOPHILIZED, FOR SOLUTION INTRAVENOUS at 10:22

## 2021-01-01 RX ADMIN — INSULIN HUMAN 5 UNITS: 100 INJECTION, SOLUTION PARENTERAL at 17:47

## 2021-01-01 RX ADMIN — Medication 15 MG/HR: at 07:03

## 2021-01-01 RX ADMIN — SODIUM CHLORIDE, PRESERVATIVE FREE 10 ML: 5 INJECTION INTRAVENOUS at 08:56

## 2021-01-01 RX ADMIN — QUETIAPINE FUMARATE 100 MG: 100 TABLET ORAL at 21:21

## 2021-01-01 RX ADMIN — INSULIN HUMAN 12 UNITS: 100 INJECTION, SOLUTION PARENTERAL at 20:11

## 2021-01-01 RX ADMIN — PANTOPRAZOLE SODIUM 40 MG: 40 INJECTION, POWDER, LYOPHILIZED, FOR SOLUTION INTRAVENOUS at 20:08

## 2021-01-01 RX ADMIN — ALBUMIN HUMAN 12.5 G: 0.25 SOLUTION INTRAVENOUS at 13:10

## 2021-01-01 RX ADMIN — ALBUTEROL SULFATE 2 PUFF: 90 AEROSOL, METERED RESPIRATORY (INHALATION) at 07:27

## 2021-01-01 RX ADMIN — IPRATROPIUM BROMIDE AND ALBUTEROL SULFATE 3 ML: 2.5; .5 SOLUTION RESPIRATORY (INHALATION) at 12:08

## 2021-01-01 RX ADMIN — INSULIN DETEMIR 20 UNITS: 100 INJECTION, SOLUTION SUBCUTANEOUS at 20:42

## 2021-01-01 RX ADMIN — ALBUTEROL SULFATE 4 PUFF: 90 AEROSOL, METERED RESPIRATORY (INHALATION) at 00:46

## 2021-01-01 RX ADMIN — METHYLPREDNISOLONE SODIUM SUCCINATE 40 MG: 40 INJECTION, POWDER, LYOPHILIZED, FOR SOLUTION INTRAMUSCULAR; INTRAVENOUS at 05:47

## 2021-01-01 RX ADMIN — NYSTATIN: 100000 POWDER TOPICAL at 21:00

## 2021-01-01 RX ADMIN — DEXAMETHASONE SODIUM PHOSPHATE 6 MG: 4 INJECTION, SOLUTION INTRA-ARTICULAR; INTRALESIONAL; INTRAMUSCULAR; INTRAVENOUS; SOFT TISSUE at 08:03

## 2021-01-01 RX ADMIN — DEXTROSE MONOHYDRATE 50 ML: 25 INJECTION, SOLUTION INTRAVENOUS at 19:35

## 2021-01-01 RX ADMIN — CHLORHEXIDINE GLUCONATE 0.12% ORAL RINSE 15 ML: 1.2 LIQUID ORAL at 08:36

## 2021-01-01 RX ADMIN — PANTOPRAZOLE SODIUM 40 MG: 40 INJECTION, POWDER, LYOPHILIZED, FOR SOLUTION INTRAVENOUS at 20:51

## 2021-01-01 RX ADMIN — MIDODRINE HYDROCHLORIDE 5 MG: 5 TABLET ORAL at 19:07

## 2021-01-01 RX ADMIN — CALCIUM GLUCONATE 1 G: 98 INJECTION, SOLUTION INTRAVENOUS at 07:16

## 2021-01-01 RX ADMIN — QUETIAPINE 50 MG: 25 TABLET, FILM COATED ORAL at 20:08

## 2021-01-01 RX ADMIN — LEVETIRACETAM 2000 MG: 500 INJECTION, SOLUTION INTRAVENOUS at 10:12

## 2021-01-01 RX ADMIN — INSULIN DETEMIR 10 UNITS: 100 INJECTION, SOLUTION SUBCUTANEOUS at 08:44

## 2021-01-01 RX ADMIN — INSULIN HUMAN 16 UNITS: 100 INJECTION, SOLUTION PARENTERAL at 20:11

## 2021-01-01 RX ADMIN — LORAZEPAM 0.5 MG: 2 INJECTION INTRAMUSCULAR; INTRAVENOUS at 16:34

## 2021-01-01 RX ADMIN — ENOXAPARIN SODIUM 40 MG: 40 INJECTION SUBCUTANEOUS at 20:03

## 2021-01-01 RX ADMIN — MIDODRINE HYDROCHLORIDE 5 MG: 5 TABLET ORAL at 18:18

## 2021-01-01 RX ADMIN — TAZOBACTAM SODIUM AND PIPERACILLIN SODIUM 3.38 G: 375; 3 INJECTION, SOLUTION INTRAVENOUS at 08:45

## 2021-01-01 RX ADMIN — ENOXAPARIN SODIUM 40 MG: 40 INJECTION SUBCUTANEOUS at 20:51

## 2021-01-01 RX ADMIN — Medication 0.16 MCG/KG/MIN: at 19:04

## 2021-01-01 RX ADMIN — ENOXAPARIN SODIUM 40 MG: 40 INJECTION SUBCUTANEOUS at 08:41

## 2021-01-01 RX ADMIN — LEVETIRACETAM 250 MG: 500 INJECTION, SOLUTION INTRAVENOUS at 08:38

## 2021-01-01 RX ADMIN — ENOXAPARIN SODIUM 40 MG: 40 INJECTION SUBCUTANEOUS at 08:07

## 2021-01-01 RX ADMIN — METHYLPREDNISOLONE SODIUM SUCCINATE 60 MG: 125 INJECTION, POWDER, FOR SOLUTION INTRAMUSCULAR; INTRAVENOUS at 21:48

## 2021-01-01 RX ADMIN — LORAZEPAM 1 MG: 2 INJECTION INTRAMUSCULAR; INTRAVENOUS at 23:48

## 2021-01-01 RX ADMIN — FENTANYL CITRATE 400 MCG/HR: 0.05 INJECTION, SOLUTION INTRAMUSCULAR; INTRAVENOUS at 21:53

## 2021-01-01 RX ADMIN — PANTOPRAZOLE SODIUM 40 MG: 40 INJECTION, POWDER, LYOPHILIZED, FOR SOLUTION INTRAVENOUS at 20:41

## 2021-01-01 RX ADMIN — INSULIN HUMAN 5 UNITS: 100 INJECTION, SOLUTION PARENTERAL at 18:20

## 2021-01-01 RX ADMIN — DIAZEPAM 5 MG: 5 TABLET ORAL at 02:59

## 2021-01-01 RX ADMIN — SODIUM CHLORIDE, PRESERVATIVE FREE 10 ML: 5 INJECTION INTRAVENOUS at 08:17

## 2021-01-01 RX ADMIN — MIDODRINE HYDROCHLORIDE 5 MG: 5 TABLET ORAL at 10:53

## 2021-01-01 RX ADMIN — SODIUM CHLORIDE, PRESERVATIVE FREE 10 ML: 5 INJECTION INTRAVENOUS at 20:08

## 2021-01-01 RX ADMIN — FUROSEMIDE 40 MG: 10 INJECTION INTRAMUSCULAR; INTRAVENOUS at 20:34

## 2021-01-01 RX ADMIN — MICAFUNGIN SODIUM 100 MG: 100 INJECTION, POWDER, LYOPHILIZED, FOR SOLUTION INTRAVENOUS at 11:51

## 2021-01-01 RX ADMIN — INSULIN HUMAN 8 UNITS: 100 INJECTION, SOLUTION PARENTERAL at 20:58

## 2021-01-01 RX ADMIN — NYSTATIN: 100000 POWDER TOPICAL at 09:37

## 2021-01-01 RX ADMIN — SENNOSIDES AND DOCUSATE SODIUM 2 TABLET: 8.6; 5 TABLET ORAL at 08:41

## 2021-01-01 RX ADMIN — SODIUM CHLORIDE, PRESERVATIVE FREE 10 ML: 5 INJECTION INTRAVENOUS at 08:05

## 2021-01-01 RX ADMIN — INSULIN DETEMIR 20 UNITS: 100 INJECTION, SOLUTION SUBCUTANEOUS at 08:32

## 2021-01-01 RX ADMIN — Medication 8 MG/HR: at 14:11

## 2021-01-01 RX ADMIN — ACETAMINOPHEN 650 MG: 650 SUPPOSITORY RECTAL at 01:31

## 2021-01-01 RX ADMIN — METHYLPREDNISOLONE SODIUM SUCCINATE 40 MG: 40 INJECTION, POWDER, LYOPHILIZED, FOR SOLUTION INTRAMUSCULAR; INTRAVENOUS at 14:35

## 2021-01-01 RX ADMIN — METHYLPREDNISOLONE SODIUM SUCCINATE 60 MG: 125 INJECTION, POWDER, FOR SOLUTION INTRAMUSCULAR; INTRAVENOUS at 09:10

## 2021-01-01 RX ADMIN — DIAZEPAM 5 MG: 5 TABLET ORAL at 02:51

## 2021-01-01 RX ADMIN — NYSTATIN: 100000 POWDER TOPICAL at 20:44

## 2021-01-01 RX ADMIN — PANTOPRAZOLE SODIUM 40 MG: 40 INJECTION, POWDER, LYOPHILIZED, FOR SOLUTION INTRAVENOUS at 09:21

## 2021-01-01 RX ADMIN — DIAZEPAM 5 MG: 5 TABLET ORAL at 12:04

## 2021-01-01 RX ADMIN — ALBUTEROL SULFATE 4 PUFF: 90 AEROSOL, METERED RESPIRATORY (INHALATION) at 19:01

## 2021-01-01 RX ADMIN — INSULIN HUMAN 5 UNITS: 100 INJECTION, SOLUTION PARENTERAL at 12:17

## 2021-01-01 RX ADMIN — Medication: at 10:48

## 2021-01-01 RX ADMIN — ACETAMINOPHEN ORAL SOLUTION 650 MG: 650 SOLUTION ORAL at 07:00

## 2021-01-01 RX ADMIN — SODIUM BICARBONATE 50 MEQ: 84 INJECTION, SOLUTION INTRAVENOUS at 06:59

## 2021-01-01 RX ADMIN — LEVOTHYROXINE SODIUM ANHYDROUS 100 MCG: 100 INJECTION, POWDER, LYOPHILIZED, FOR SOLUTION INTRAVENOUS at 11:36

## 2021-01-01 RX ADMIN — SODIUM CHLORIDE, PRESERVATIVE FREE 10 ML: 5 INJECTION INTRAVENOUS at 08:33

## 2021-01-01 RX ADMIN — Medication 1 PACKET: at 08:39

## 2021-01-01 RX ADMIN — INSULIN HUMAN 8 UNITS: 100 INJECTION, SOLUTION PARENTERAL at 23:41

## 2021-01-01 RX ADMIN — IPRATROPIUM BROMIDE AND ALBUTEROL SULFATE 3 ML: 2.5; .5 SOLUTION RESPIRATORY (INHALATION) at 22:33

## 2021-01-01 RX ADMIN — DEXTROSE MONOHYDRATE 25 G: 25 INJECTION, SOLUTION INTRAVENOUS at 23:52

## 2021-01-01 RX ADMIN — LEVOFLOXACIN 500 MG: 5 INJECTION, SOLUTION INTRAVENOUS at 14:13

## 2021-01-01 RX ADMIN — DOCUSATE SODIUM 50MG AND SENNOSIDES 8.6MG 2 TABLET: 8.6; 5 TABLET, FILM COATED ORAL at 08:45

## 2021-01-01 RX ADMIN — FENTANYL CITRATE 200 MCG/HR: 0.05 INJECTION, SOLUTION INTRAMUSCULAR; INTRAVENOUS at 01:42

## 2021-01-01 RX ADMIN — ALBUTEROL SULFATE 4 PUFF: 90 AEROSOL, METERED RESPIRATORY (INHALATION) at 19:00

## 2021-01-01 RX ADMIN — INSULIN HUMAN 4 UNITS: 100 INJECTION, SOLUTION PARENTERAL at 00:28

## 2021-01-01 RX ADMIN — INSULIN HUMAN 5 UNITS: 100 INJECTION, SOLUTION PARENTERAL at 18:09

## 2021-01-01 RX ADMIN — VANCOMYCIN HYDROCHLORIDE 750 MG: 750 INJECTION, SOLUTION INTRAVENOUS at 17:12

## 2021-01-01 RX ADMIN — INSULIN HUMAN 2 UNITS: 100 INJECTION, SOLUTION PARENTERAL at 12:03

## 2021-01-01 RX ADMIN — INSULIN HUMAN 12 UNITS: 100 INJECTION, SOLUTION PARENTERAL at 14:14

## 2021-01-01 RX ADMIN — ALBUTEROL SULFATE 4 PUFF: 90 AEROSOL, METERED RESPIRATORY (INHALATION) at 11:42

## 2021-01-01 RX ADMIN — DIAZEPAM 5 MG: 5 INJECTION, SOLUTION INTRAMUSCULAR; INTRAVENOUS at 17:50

## 2021-01-01 RX ADMIN — POLYETHYLENE GLYCOL 3350 17 G: 17 POWDER, FOR SOLUTION ORAL at 08:31

## 2021-01-01 RX ADMIN — MIDODRINE HYDROCHLORIDE 5 MG: 5 TABLET ORAL at 11:58

## 2021-01-01 RX ADMIN — HYDROCODONE BITARTRATE AND ACETAMINOPHEN 1 TABLET: 5; 325 TABLET ORAL at 03:09

## 2021-01-01 RX ADMIN — CEFTAZIDIME 1 G: 1 INJECTION, POWDER, FOR SOLUTION INTRAMUSCULAR; INTRAVENOUS at 08:36

## 2021-01-01 RX ADMIN — ALBUTEROL SULFATE 2 PUFF: 90 AEROSOL, METERED RESPIRATORY (INHALATION) at 12:29

## 2021-01-01 RX ADMIN — NYSTATIN: 100000 POWDER TOPICAL at 21:22

## 2021-01-01 RX ADMIN — FENTANYL CITRATE 300 MCG/HR: 0.05 INJECTION, SOLUTION INTRAMUSCULAR; INTRAVENOUS at 11:54

## 2021-01-01 RX ADMIN — LORAZEPAM 0.5 MG: 2 INJECTION INTRAMUSCULAR; INTRAVENOUS at 20:05

## 2021-01-01 RX ADMIN — Medication 0.02 MCG/KG/MIN: at 01:06

## 2021-01-01 RX ADMIN — INSULIN HUMAN 2 UNITS: 100 INJECTION, SOLUTION PARENTERAL at 23:10

## 2021-01-01 RX ADMIN — INSULIN DETEMIR 20 UNITS: 100 INJECTION, SOLUTION SUBCUTANEOUS at 20:27

## 2021-01-01 RX ADMIN — BARICITINIB 4 MG: 2 TABLET, FILM COATED ORAL at 09:02

## 2021-01-01 RX ADMIN — NYSTATIN: 100000 POWDER TOPICAL at 20:08

## 2021-01-01 RX ADMIN — ALPRAZOLAM 0.5 MG: 0.5 TABLET ORAL at 05:08

## 2021-01-01 RX ADMIN — ENOXAPARIN SODIUM 40 MG: 40 INJECTION SUBCUTANEOUS at 09:09

## 2021-01-01 RX ADMIN — PANTOPRAZOLE SODIUM 40 MG: 40 INJECTION, POWDER, LYOPHILIZED, FOR SOLUTION INTRAVENOUS at 21:03

## 2021-01-01 RX ADMIN — METHYLNALTREXONE BROMIDE 12 MG: 12 INJECTION, SOLUTION SUBCUTANEOUS at 10:14

## 2021-01-01 RX ADMIN — INSULIN DETEMIR 10 UNITS: 100 INJECTION, SOLUTION SUBCUTANEOUS at 08:29

## 2021-01-01 RX ADMIN — FUROSEMIDE 40 MG: 10 INJECTION INTRAMUSCULAR; INTRAVENOUS at 15:53

## 2021-01-01 RX ADMIN — ONDANSETRON 4 MG: 2 INJECTION INTRAMUSCULAR; INTRAVENOUS at 23:12

## 2021-01-01 RX ADMIN — FENTANYL CITRATE 200 MCG/HR: 0.05 INJECTION, SOLUTION INTRAMUSCULAR; INTRAVENOUS at 16:03

## 2021-01-01 RX ADMIN — DEXTROSE MONOHYDRATE 25 G: 25 INJECTION, SOLUTION INTRAVENOUS at 17:21

## 2021-01-01 RX ADMIN — INSULIN HUMAN 12 UNITS: 100 INJECTION, SOLUTION PARENTERAL at 20:59

## 2021-01-01 RX ADMIN — Medication 8 MG/HR: at 18:17

## 2021-01-01 RX ADMIN — CHLORHEXIDINE GLUCONATE 0.12% ORAL RINSE 15 ML: 1.2 LIQUID ORAL at 21:12

## 2021-01-01 RX ADMIN — NYSTATIN: 100000 POWDER TOPICAL at 21:36

## 2021-01-01 RX ADMIN — CHLORHEXIDINE GLUCONATE 0.12% ORAL RINSE 15 ML: 1.2 LIQUID ORAL at 08:25

## 2021-01-01 RX ADMIN — DOCUSATE SODIUM 50MG AND SENNOSIDES 8.6MG 2 TABLET: 8.6; 5 TABLET, FILM COATED ORAL at 20:06

## 2021-01-01 RX ADMIN — ENOXAPARIN SODIUM 40 MG: 40 INJECTION SUBCUTANEOUS at 20:31

## 2021-01-01 RX ADMIN — INSULIN HUMAN 5 UNITS: 100 INJECTION, SOLUTION PARENTERAL at 17:40

## 2021-01-01 RX ADMIN — ACETAMINOPHEN ORAL SOLUTION 650 MG: 650 SOLUTION ORAL at 19:58

## 2021-01-01 RX ADMIN — LEVOTHYROXINE SODIUM ANHYDROUS 75 MCG: 100 INJECTION, POWDER, LYOPHILIZED, FOR SOLUTION INTRAVENOUS at 12:12

## 2021-01-01 RX ADMIN — INSULIN HUMAN 3 UNITS: 100 INJECTION, SOLUTION PARENTERAL at 05:46

## 2021-01-01 RX ADMIN — OXYCODONE HYDROCHLORIDE 10 MG: 5 TABLET ORAL at 11:58

## 2021-01-01 RX ADMIN — INSULIN HUMAN 5 UNITS: 100 INJECTION, SOLUTION PARENTERAL at 00:27

## 2021-01-01 RX ADMIN — METHYLPREDNISOLONE SODIUM SUCCINATE 60 MG: 125 INJECTION, POWDER, FOR SOLUTION INTRAMUSCULAR; INTRAVENOUS at 08:38

## 2021-01-01 RX ADMIN — ALBUTEROL SULFATE 2 PUFF: 90 AEROSOL, METERED RESPIRATORY (INHALATION) at 15:58

## 2021-01-01 RX ADMIN — REMDESIVIR 100 MG: 100 INJECTION, POWDER, LYOPHILIZED, FOR SOLUTION INTRAVENOUS at 11:27

## 2021-01-01 RX ADMIN — METHYLPREDNISOLONE SODIUM SUCCINATE 60 MG: 125 INJECTION, POWDER, FOR SOLUTION INTRAMUSCULAR; INTRAVENOUS at 14:38

## 2021-01-01 RX ADMIN — SODIUM CHLORIDE, PRESERVATIVE FREE 10 ML: 5 INJECTION INTRAVENOUS at 20:13

## 2021-01-01 RX ADMIN — ALBUTEROL SULFATE 4 PUFF: 90 AEROSOL, METERED RESPIRATORY (INHALATION) at 12:57

## 2021-01-01 RX ADMIN — BUDESONIDE AND FORMOTEROL FUMARATE DIHYDRATE 2 PUFF: 160; 4.5 AEROSOL RESPIRATORY (INHALATION) at 18:54

## 2021-01-01 RX ADMIN — CHLORHEXIDINE GLUCONATE 0.12% ORAL RINSE 15 ML: 1.2 LIQUID ORAL at 08:29

## 2021-01-01 RX ADMIN — ENOXAPARIN SODIUM 40 MG: 40 INJECTION SUBCUTANEOUS at 21:02

## 2021-01-01 RX ADMIN — FENTANYL CITRATE 150 MCG/HR: 0.05 INJECTION, SOLUTION INTRAMUSCULAR; INTRAVENOUS at 05:45

## 2021-01-01 RX ADMIN — DIAZEPAM 5 MG: 5 TABLET ORAL at 03:05

## 2021-01-01 RX ADMIN — NYSTATIN: 100000 POWDER TOPICAL at 08:59

## 2021-01-01 RX ADMIN — INSULIN HUMAN 5 UNITS: 100 INJECTION, SOLUTION PARENTERAL at 00:23

## 2021-01-01 RX ADMIN — ALBUMIN HUMAN 12.5 G: 0.25 SOLUTION INTRAVENOUS at 10:37

## 2021-01-01 RX ADMIN — DOCUSATE SODIUM 100 MG: 50 LIQUID ORAL at 20:13

## 2021-01-01 RX ADMIN — MICAFUNGIN SODIUM 100 MG: 100 INJECTION, POWDER, LYOPHILIZED, FOR SOLUTION INTRAVENOUS at 20:24

## 2021-01-01 RX ADMIN — BUMETANIDE 2 MG: 0.25 INJECTION INTRAMUSCULAR; INTRAVENOUS at 11:10

## 2021-01-01 RX ADMIN — SENNOSIDES 10 ML: 8.8 LIQUID ORAL at 08:24

## 2021-01-01 RX ADMIN — ACETYLCYSTEINE 2 ML: 200 SOLUTION ORAL; RESPIRATORY (INHALATION) at 19:05

## 2021-01-01 RX ADMIN — FENTANYL CITRATE 300 MCG/HR: 50 INJECTION INTRAVENOUS at 12:16

## 2021-01-01 RX ADMIN — INSULIN DETEMIR 20 UNITS: 100 INJECTION, SOLUTION SUBCUTANEOUS at 21:26

## 2021-01-01 RX ADMIN — CHLORHEXIDINE GLUCONATE 0.12% ORAL RINSE 15 ML: 1.2 LIQUID ORAL at 20:52

## 2021-01-01 RX ADMIN — INSULIN DETEMIR 20 UNITS: 100 INJECTION, SOLUTION SUBCUTANEOUS at 21:56

## 2021-01-01 RX ADMIN — OXYCODONE HYDROCHLORIDE 10 MG: 5 TABLET ORAL at 23:39

## 2021-01-01 RX ADMIN — ALBUTEROL SULFATE 4 PUFF: 90 AEROSOL, METERED RESPIRATORY (INHALATION) at 18:39

## 2021-01-01 RX ADMIN — LORAZEPAM 1 MG: 2 INJECTION INTRAMUSCULAR; INTRAVENOUS at 23:59

## 2021-01-01 RX ADMIN — MIDODRINE HYDROCHLORIDE 5 MG: 5 TABLET ORAL at 06:30

## 2021-01-01 RX ADMIN — ENOXAPARIN SODIUM 40 MG: 40 INJECTION SUBCUTANEOUS at 20:22

## 2021-01-01 RX ADMIN — CETIRIZINE HYDROCHLORIDE 10 MG: 10 TABLET, FILM COATED ORAL at 08:41

## 2021-01-01 RX ADMIN — ENOXAPARIN SODIUM 40 MG: 40 INJECTION SUBCUTANEOUS at 08:55

## 2021-01-01 RX ADMIN — INSULIN HUMAN 5 UNITS: 100 INJECTION, SOLUTION PARENTERAL at 06:02

## 2021-01-01 RX ADMIN — FENTANYL CITRATE 275 MCG/HR: 0.05 INJECTION, SOLUTION INTRAMUSCULAR; INTRAVENOUS at 10:21

## 2021-01-01 RX ADMIN — ENOXAPARIN SODIUM 110 MG: 120 INJECTION SUBCUTANEOUS at 20:13

## 2021-01-01 RX ADMIN — VALACYCLOVIR HYDROCHLORIDE 1000 MG: 500 TABLET, FILM COATED ORAL at 08:25

## 2021-01-01 RX ADMIN — ENOXAPARIN SODIUM 40 MG: 40 INJECTION SUBCUTANEOUS at 21:00

## 2021-01-01 RX ADMIN — INSULIN HUMAN 5 UNITS: 100 INJECTION, SOLUTION PARENTERAL at 06:17

## 2021-01-01 RX ADMIN — Medication: at 06:31

## 2021-01-01 RX ADMIN — INSULIN HUMAN 10 UNITS: 100 INJECTION, SOLUTION PARENTERAL at 19:36

## 2021-01-01 RX ADMIN — Medication 0.12 MCG/KG/MIN: at 14:35

## 2021-01-01 RX ADMIN — NYSTATIN: 100000 POWDER TOPICAL at 08:25

## 2021-01-01 RX ADMIN — Medication 30 ML: at 20:06

## 2021-01-01 RX ADMIN — TAZOBACTAM SODIUM AND PIPERACILLIN SODIUM 3.38 G: 375; 3 INJECTION, SOLUTION INTRAVENOUS at 15:26

## 2021-01-01 RX ADMIN — DIAZEPAM 5 MG: 5 TABLET ORAL at 20:58

## 2021-01-01 RX ADMIN — QUETIAPINE FUMARATE 100 MG: 100 TABLET ORAL at 13:26

## 2021-01-01 RX ADMIN — INSULIN HUMAN 5 UNITS: 100 INJECTION, SOLUTION PARENTERAL at 11:39

## 2021-01-01 RX ADMIN — QUETIAPINE FUMARATE 50 MG: 25 TABLET ORAL at 09:50

## 2021-01-01 RX ADMIN — Medication 15 MG/HR: at 20:30

## 2021-01-01 RX ADMIN — ALBUTEROL SULFATE 4 PUFF: 90 AEROSOL, METERED RESPIRATORY (INHALATION) at 00:27

## 2021-01-01 RX ADMIN — ALBUTEROL SULFATE 4 PUFF: 90 AEROSOL, METERED RESPIRATORY (INHALATION) at 00:43

## 2021-01-01 RX ADMIN — ENOXAPARIN SODIUM 40 MG: 40 INJECTION SUBCUTANEOUS at 08:05

## 2021-01-01 RX ADMIN — SODIUM CHLORIDE, PRESERVATIVE FREE 10 ML: 5 INJECTION INTRAVENOUS at 21:01

## 2021-01-01 RX ADMIN — CEFTAZIDIME 1 G: 1 INJECTION, POWDER, FOR SOLUTION INTRAMUSCULAR; INTRAVENOUS at 16:26

## 2021-01-01 RX ADMIN — Medication 0.02 MCG/KG/MIN: at 11:26

## 2021-01-01 RX ADMIN — Medication: at 18:17

## 2021-01-01 RX ADMIN — CHLORHEXIDINE GLUCONATE 0.12% ORAL RINSE 15 ML: 1.2 LIQUID ORAL at 08:54

## 2021-01-01 RX ADMIN — INSULIN HUMAN 5 UNITS: 100 INJECTION, SOLUTION PARENTERAL at 13:00

## 2021-01-01 RX ADMIN — ALBUTEROL SULFATE 4 PUFF: 90 AEROSOL, METERED RESPIRATORY (INHALATION) at 07:16

## 2021-01-01 RX ADMIN — ALBUTEROL SULFATE 4 PUFF: 90 AEROSOL, METERED RESPIRATORY (INHALATION) at 01:43

## 2021-01-01 RX ADMIN — DOCUSATE SODIUM 100 MG: 50 LIQUID ORAL at 08:24

## 2021-01-01 RX ADMIN — INSULIN HUMAN 12 UNITS: 100 INJECTION, SOLUTION PARENTERAL at 08:50

## 2021-01-01 RX ADMIN — INSULIN HUMAN 12 UNITS: 100 INJECTION, SOLUTION PARENTERAL at 02:36

## 2021-01-01 RX ADMIN — FENTANYL CITRATE 50 MCG/HR: 0.05 INJECTION, SOLUTION INTRAMUSCULAR; INTRAVENOUS at 16:45

## 2021-01-01 RX ADMIN — BUMETANIDE 1 MG/HR: 0.25 INJECTION INTRAMUSCULAR; INTRAVENOUS at 21:26

## 2021-01-01 RX ADMIN — METHYLPREDNISOLONE SODIUM SUCCINATE 40 MG: 40 INJECTION, POWDER, LYOPHILIZED, FOR SOLUTION INTRAMUSCULAR; INTRAVENOUS at 05:26

## 2021-01-01 RX ADMIN — BUDESONIDE AND FORMOTEROL FUMARATE DIHYDRATE 2 PUFF: 160; 4.5 AEROSOL RESPIRATORY (INHALATION) at 19:26

## 2021-01-01 RX ADMIN — DIAZEPAM 5 MG: 5 TABLET ORAL at 02:11

## 2021-01-01 RX ADMIN — PANTOPRAZOLE SODIUM 40 MG: 40 INJECTION, POWDER, LYOPHILIZED, FOR SOLUTION INTRAVENOUS at 08:45

## 2021-01-01 RX ADMIN — ALBUTEROL SULFATE 4 PUFF: 90 AEROSOL, METERED RESPIRATORY (INHALATION) at 19:16

## 2021-01-01 RX ADMIN — Medication 15 MG/HR: at 08:59

## 2021-01-01 RX ADMIN — PANTOPRAZOLE SODIUM 40 MG: 40 INJECTION, POWDER, LYOPHILIZED, FOR SOLUTION INTRAVENOUS at 08:11

## 2021-01-01 RX ADMIN — POLYETHYLENE GLYCOL 3350 17 G: 17 POWDER, FOR SOLUTION ORAL at 08:49

## 2021-01-01 RX ADMIN — ENOXAPARIN SODIUM 40 MG: 40 INJECTION SUBCUTANEOUS at 08:59

## 2021-01-01 RX ADMIN — ENOXAPARIN SODIUM 110 MG: 120 INJECTION SUBCUTANEOUS at 08:39

## 2021-01-01 RX ADMIN — MIDODRINE HYDROCHLORIDE 5 MG: 5 TABLET ORAL at 08:39

## 2021-01-01 RX ADMIN — NYSTATIN: 100000 POWDER TOPICAL at 08:56

## 2021-01-01 RX ADMIN — Medication 30 ML: at 20:08

## 2021-01-01 RX ADMIN — IPRATROPIUM BROMIDE AND ALBUTEROL SULFATE 3 ML: 2.5; .5 SOLUTION RESPIRATORY (INHALATION) at 22:52

## 2021-01-01 RX ADMIN — DIAZEPAM 5 MG: 5 TABLET ORAL at 08:06

## 2021-01-01 RX ADMIN — CEFTAZIDIME 2 G: 2 INJECTION, POWDER, FOR SOLUTION INTRAVENOUS at 04:56

## 2021-01-01 RX ADMIN — QUETIAPINE 50 MG: 25 TABLET, FILM COATED ORAL at 21:36

## 2021-01-01 RX ADMIN — OXYCODONE 10 MG: 5 TABLET ORAL at 11:15

## 2021-01-01 RX ADMIN — ALBUTEROL SULFATE 4 PUFF: 90 AEROSOL, METERED RESPIRATORY (INHALATION) at 11:51

## 2021-01-01 RX ADMIN — CEFTAZIDIME 2 G: 2 INJECTION, POWDER, FOR SOLUTION INTRAVENOUS at 11:52

## 2021-01-01 RX ADMIN — ALBUTEROL SULFATE 4 PUFF: 90 AEROSOL, METERED RESPIRATORY (INHALATION) at 01:05

## 2021-01-01 RX ADMIN — CEFTAZIDIME 2 G: 2 INJECTION, POWDER, FOR SOLUTION INTRAVENOUS at 04:34

## 2021-01-01 RX ADMIN — Medication 15 MG/HR: at 03:01

## 2021-01-01 RX ADMIN — PANTOPRAZOLE SODIUM 40 MG: 40 INJECTION, POWDER, LYOPHILIZED, FOR SOLUTION INTRAVENOUS at 08:07

## 2021-01-01 RX ADMIN — SODIUM CHLORIDE, PRESERVATIVE FREE 10 ML: 5 INJECTION INTRAVENOUS at 20:07

## 2021-01-01 RX ADMIN — SENNOSIDES AND DOCUSATE SODIUM 2 TABLET: 8.6; 5 TABLET ORAL at 08:49

## 2021-01-01 RX ADMIN — LORAZEPAM 1 MG: 2 INJECTION INTRAMUSCULAR; INTRAVENOUS at 05:42

## 2021-01-01 RX ADMIN — ALPRAZOLAM 0.5 MG: 0.5 TABLET ORAL at 03:19

## 2021-01-01 RX ADMIN — REMDESIVIR 100 MG: 100 INJECTION, POWDER, LYOPHILIZED, FOR SOLUTION INTRAVENOUS at 11:13

## 2021-01-01 RX ADMIN — DOCUSATE SODIUM 100 MG: 50 LIQUID ORAL at 09:50

## 2021-01-01 RX ADMIN — SENNOSIDES 10 ML: 8.8 LIQUID ORAL at 22:01

## 2021-01-01 RX ADMIN — SODIUM ZIRCONIUM CYCLOSILICATE 10 G: 10 POWDER, FOR SUSPENSION ORAL at 11:13

## 2021-01-01 RX ADMIN — SENNOSIDES 10 ML: 8.8 LIQUID ORAL at 09:03

## 2021-01-01 RX ADMIN — ACETAZOLAMIDE 500 MG: 250 TABLET ORAL at 15:30

## 2021-01-01 RX ADMIN — MIDODRINE HYDROCHLORIDE 5 MG: 5 TABLET ORAL at 09:10

## 2021-01-01 RX ADMIN — INSULIN HUMAN 2 UNITS: 100 INJECTION, SOLUTION PARENTERAL at 11:29

## 2021-01-01 RX ADMIN — PANTOPRAZOLE SODIUM 40 MG: 40 INJECTION, POWDER, LYOPHILIZED, FOR SOLUTION INTRAVENOUS at 20:03

## 2021-01-01 RX ADMIN — INSULIN HUMAN 4 UNITS: 100 INJECTION, SOLUTION PARENTERAL at 20:38

## 2021-01-01 RX ADMIN — CEFTAZIDIME 2 G: 2 INJECTION, POWDER, FOR SOLUTION INTRAVENOUS at 03:47

## 2021-01-01 RX ADMIN — PROPOFOL 25 MCG/KG/MIN: 10 INJECTION, EMULSION INTRAVENOUS at 14:54

## 2021-01-01 RX ADMIN — ALBUTEROL SULFATE 4 PUFF: 90 AEROSOL, METERED RESPIRATORY (INHALATION) at 10:53

## 2021-01-01 RX ADMIN — NYSTATIN: 100000 POWDER TOPICAL at 20:42

## 2021-01-01 RX ADMIN — LORAZEPAM 2 MG: 2 INJECTION INTRAMUSCULAR; INTRAVENOUS at 13:25

## 2021-01-01 RX ADMIN — MAGNESIUM OXIDE TAB 400 MG (241.3 MG ELEMENTAL MG) 400 MG: 400 (241.3 MG) TAB at 08:41

## 2021-01-01 RX ADMIN — FUROSEMIDE 40 MG: 10 INJECTION, SOLUTION INTRAVENOUS at 23:06

## 2021-01-01 RX ADMIN — CEFTAZIDIME 1 G: 1 INJECTION, POWDER, FOR SOLUTION INTRAMUSCULAR; INTRAVENOUS at 23:52

## 2021-01-01 RX ADMIN — QUETIAPINE 50 MG: 25 TABLET, FILM COATED ORAL at 23:15

## 2021-01-01 RX ADMIN — OXYCODONE HYDROCHLORIDE 10 MG: 5 TABLET ORAL at 23:47

## 2021-01-01 RX ADMIN — QUETIAPINE FUMARATE 100 MG: 100 TABLET ORAL at 21:36

## 2021-01-01 RX ADMIN — BUDESONIDE AND FORMOTEROL FUMARATE DIHYDRATE 2 PUFF: 160; 4.5 AEROSOL RESPIRATORY (INHALATION) at 07:28

## 2021-01-01 RX ADMIN — INSULIN HUMAN 24 UNITS: 100 INJECTION, SOLUTION PARENTERAL at 17:44

## 2021-01-01 RX ADMIN — LEVOTHYROXINE SODIUM ANHYDROUS 75 MCG: 100 INJECTION, POWDER, LYOPHILIZED, FOR SOLUTION INTRAVENOUS at 11:23

## 2021-01-01 RX ADMIN — ROPINIROLE HYDROCHLORIDE 5 MG: 2 TABLET, FILM COATED ORAL at 20:02

## 2021-01-01 RX ADMIN — FENTANYL CITRATE 300 MCG/HR: 0.05 INJECTION, SOLUTION INTRAMUSCULAR; INTRAVENOUS at 03:51

## 2021-01-01 RX ADMIN — LORAZEPAM 1 MG: 2 INJECTION INTRAMUSCULAR; INTRAVENOUS at 11:47

## 2021-01-01 RX ADMIN — ENOXAPARIN SODIUM 110 MG: 120 INJECTION SUBCUTANEOUS at 21:22

## 2021-01-01 RX ADMIN — INSULIN HUMAN 4 UNITS: 100 INJECTION, SOLUTION PARENTERAL at 06:05

## 2021-01-01 RX ADMIN — SENNOSIDES AND DOCUSATE SODIUM 2 TABLET: 8.6; 5 TABLET ORAL at 20:31

## 2021-01-01 RX ADMIN — MIDAZOLAM 4 MG: 1 INJECTION INTRAMUSCULAR; INTRAVENOUS at 21:17

## 2021-01-01 RX ADMIN — INSULIN HUMAN 5 UNITS: 100 INJECTION, SOLUTION PARENTERAL at 23:55

## 2021-01-01 RX ADMIN — SODIUM CHLORIDE, PRESERVATIVE FREE 10 ML: 5 INJECTION INTRAVENOUS at 08:21

## 2021-01-01 RX ADMIN — INSULIN DETEMIR 20 UNITS: 100 INJECTION, SOLUTION SUBCUTANEOUS at 22:03

## 2021-01-01 RX ADMIN — DOCUSATE SODIUM 100 MG: 50 LIQUID ORAL at 08:29

## 2021-01-01 RX ADMIN — ALBUTEROL SULFATE 4 PUFF: 90 AEROSOL, METERED RESPIRATORY (INHALATION) at 06:57

## 2021-01-01 RX ADMIN — INSULIN HUMAN 5 UNITS: 100 INJECTION, SOLUTION PARENTERAL at 05:27

## 2021-01-01 RX ADMIN — LORAZEPAM 1 MG: 2 INJECTION INTRAMUSCULAR; INTRAVENOUS at 17:46

## 2021-01-01 RX ADMIN — VALACYCLOVIR HYDROCHLORIDE 1000 MG: 500 TABLET, FILM COATED ORAL at 08:58

## 2021-01-01 RX ADMIN — ACETAZOLAMIDE 500 MG: 250 TABLET ORAL at 09:06

## 2021-01-01 RX ADMIN — INSULIN HUMAN 4 UNITS: 100 INJECTION, SOLUTION PARENTERAL at 03:23

## 2021-01-01 RX ADMIN — CEFTAZIDIME 2 G: 2 INJECTION, POWDER, FOR SOLUTION INTRAVENOUS at 19:58

## 2021-01-01 RX ADMIN — INSULIN HUMAN 5 UNITS: 100 INJECTION, SOLUTION PARENTERAL at 06:09

## 2021-01-01 RX ADMIN — NYSTATIN 1 APPLICATION: 100000 POWDER TOPICAL at 08:25

## 2021-01-01 RX ADMIN — DIAZEPAM 5 MG: 5 TABLET ORAL at 02:30

## 2021-01-01 RX ADMIN — CHLORHEXIDINE GLUCONATE 0.12% ORAL RINSE 15 ML: 1.2 LIQUID ORAL at 20:08

## 2021-01-01 RX ADMIN — NYSTATIN: 100000 POWDER TOPICAL at 22:01

## 2021-01-01 RX ADMIN — SODIUM CHLORIDE, PRESERVATIVE FREE 10 ML: 5 INJECTION INTRAVENOUS at 08:14

## 2021-01-01 RX ADMIN — INSULIN HUMAN 8 UNITS: 100 INJECTION, SOLUTION PARENTERAL at 05:47

## 2021-01-01 RX ADMIN — SODIUM CHLORIDE, PRESERVATIVE FREE 10 ML: 5 INJECTION INTRAVENOUS at 20:03

## 2021-01-01 RX ADMIN — ROPINIROLE HYDROCHLORIDE 4 MG: 2 TABLET, FILM COATED ORAL at 20:59

## 2021-01-01 RX ADMIN — CEFTAZIDIME 2 G: 2 INJECTION, POWDER, FOR SOLUTION INTRAVENOUS at 11:51

## 2021-01-01 RX ADMIN — CHLORHEXIDINE GLUCONATE 0.12% ORAL RINSE 15 ML: 1.2 LIQUID ORAL at 21:06

## 2021-01-01 RX ADMIN — CHLORHEXIDINE GLUCONATE 0.12% ORAL RINSE 15 ML: 1.2 LIQUID ORAL at 09:08

## 2021-01-01 RX ADMIN — PROPOFOL 40 MCG/KG/MIN: 10 INJECTION, EMULSION INTRAVENOUS at 02:11

## 2021-01-01 RX ADMIN — LEVOTHYROXINE SODIUM ANHYDROUS 75 MCG: 100 INJECTION, POWDER, LYOPHILIZED, FOR SOLUTION INTRAVENOUS at 11:41

## 2021-01-01 RX ADMIN — TAZOBACTAM SODIUM AND PIPERACILLIN SODIUM 3.38 G: 375; 3 INJECTION, SOLUTION INTRAVENOUS at 08:20

## 2021-01-01 RX ADMIN — ALBUTEROL SULFATE 2.5 MG: 2.5 SOLUTION RESPIRATORY (INHALATION) at 12:28

## 2021-01-01 RX ADMIN — PROPOFOL 25 MCG/KG/MIN: 10 INJECTION, EMULSION INTRAVENOUS at 10:44

## 2021-01-01 RX ADMIN — ENOXAPARIN SODIUM 40 MG: 40 INJECTION SUBCUTANEOUS at 10:32

## 2021-01-01 RX ADMIN — FENTANYL CITRATE 400 MCG/HR: 0.05 INJECTION, SOLUTION INTRAMUSCULAR; INTRAVENOUS at 05:29

## 2021-01-01 RX ADMIN — SODIUM CHLORIDE, PRESERVATIVE FREE 10 ML: 5 INJECTION INTRAVENOUS at 08:37

## 2021-01-01 RX ADMIN — INSULIN HUMAN 5 UNITS: 100 INJECTION, SOLUTION PARENTERAL at 11:51

## 2021-01-01 RX ADMIN — LEVOTHYROXINE SODIUM ANHYDROUS 75 MCG: 100 INJECTION, POWDER, LYOPHILIZED, FOR SOLUTION INTRAVENOUS at 11:19

## 2021-01-01 RX ADMIN — Medication 4 MG/HR: at 06:37

## 2021-01-01 RX ADMIN — INSULIN HUMAN 12 UNITS: 100 INJECTION, SOLUTION PARENTERAL at 11:16

## 2021-01-01 RX ADMIN — QUETIAPINE FUMARATE 75 MG: 25 TABLET ORAL at 20:12

## 2021-01-01 RX ADMIN — HYDROCODONE BITARTRATE AND ACETAMINOPHEN 1 TABLET: 5; 325 TABLET ORAL at 09:21

## 2021-01-01 RX ADMIN — BUMETANIDE 1 MG: 0.25 INJECTION INTRAMUSCULAR; INTRAVENOUS at 11:29

## 2021-01-01 RX ADMIN — LORAZEPAM 1 MG: 2 INJECTION INTRAMUSCULAR; INTRAVENOUS at 17:57

## 2021-01-01 RX ADMIN — ALBUTEROL SULFATE 4 PUFF: 90 AEROSOL, METERED RESPIRATORY (INHALATION) at 11:45

## 2021-01-01 RX ADMIN — FENTANYL CITRATE 300 MCG/HR: 0.05 INJECTION, SOLUTION INTRAMUSCULAR; INTRAVENOUS at 12:10

## 2021-01-01 RX ADMIN — FENTANYL CITRATE 200 MCG/HR: 0.05 INJECTION, SOLUTION INTRAMUSCULAR; INTRAVENOUS at 15:26

## 2021-01-01 RX ADMIN — INSULIN HUMAN 5 UNITS: 100 INJECTION, SOLUTION PARENTERAL at 11:30

## 2021-01-01 RX ADMIN — BUMETANIDE 1 MG/HR: 0.25 INJECTION INTRAMUSCULAR; INTRAVENOUS at 06:23

## 2021-01-01 RX ADMIN — LORAZEPAM 0.5 MG: 2 INJECTION INTRAMUSCULAR; INTRAVENOUS at 08:55

## 2021-01-01 RX ADMIN — SODIUM CHLORIDE, PRESERVATIVE FREE 10 ML: 5 INJECTION INTRAVENOUS at 20:23

## 2021-01-01 RX ADMIN — INSULIN HUMAN 5 UNITS: 100 INJECTION, SOLUTION PARENTERAL at 00:02

## 2021-01-01 RX ADMIN — IPRATROPIUM BROMIDE AND ALBUTEROL SULFATE 3 ML: 2.5; .5 SOLUTION RESPIRATORY (INHALATION) at 03:21

## 2021-01-01 RX ADMIN — VANCOMYCIN HYDROCHLORIDE 750 MG: 750 INJECTION, SOLUTION INTRAVENOUS at 05:28

## 2021-01-01 RX ADMIN — Medication 30 ML: at 20:03

## 2021-01-01 RX ADMIN — ACETAMINOPHEN ORAL SOLUTION 649.6 MG: 650 SOLUTION ORAL at 09:59

## 2021-01-01 RX ADMIN — ENOXAPARIN SODIUM 40 MG: 40 INJECTION SUBCUTANEOUS at 08:13

## 2021-01-01 RX ADMIN — ACETAMINOPHEN ORAL SOLUTION 650 MG: 650 SOLUTION ORAL at 16:11

## 2021-01-01 RX ADMIN — CHLORHEXIDINE GLUCONATE 0.12% ORAL RINSE 15 ML: 1.2 LIQUID ORAL at 21:10

## 2021-01-01 RX ADMIN — ALBUTEROL SULFATE 4 PUFF: 90 AEROSOL, METERED RESPIRATORY (INHALATION) at 19:17

## 2021-01-01 RX ADMIN — ALBUTEROL SULFATE 4 PUFF: 90 AEROSOL, METERED RESPIRATORY (INHALATION) at 07:22

## 2021-01-01 RX ADMIN — INSULIN HUMAN 5 UNITS: 100 INJECTION, SOLUTION PARENTERAL at 05:28

## 2021-01-01 RX ADMIN — DOCUSATE SODIUM 50MG AND SENNOSIDES 8.6MG 2 TABLET: 8.6; 5 TABLET, FILM COATED ORAL at 21:02

## 2021-01-01 RX ADMIN — BUDESONIDE AND FORMOTEROL FUMARATE DIHYDRATE 2 PUFF: 160; 4.5 AEROSOL RESPIRATORY (INHALATION) at 19:16

## 2021-01-01 RX ADMIN — ALBUMIN HUMAN 25 G: 0.25 SOLUTION INTRAVENOUS at 20:25

## 2021-01-01 RX ADMIN — CEFTAZIDIME 1 G: 1 INJECTION, POWDER, FOR SOLUTION INTRAMUSCULAR; INTRAVENOUS at 18:02

## 2021-01-01 RX ADMIN — OXYCODONE 10 MG: 5 TABLET ORAL at 11:58

## 2021-01-01 RX ADMIN — LORAZEPAM 0.5 MG: 2 INJECTION INTRAMUSCULAR; INTRAVENOUS at 09:03

## 2021-01-01 RX ADMIN — ALBUTEROL SULFATE 4 PUFF: 90 AEROSOL, METERED RESPIRATORY (INHALATION) at 19:28

## 2021-01-01 RX ADMIN — DOCUSATE SODIUM 100 MG: 50 LIQUID ORAL at 10:39

## 2021-01-01 RX ADMIN — SODIUM BICARBONATE 50 MEQ: 84 INJECTION, SOLUTION INTRAVENOUS at 19:35

## 2021-01-01 RX ADMIN — NYSTATIN 1 APPLICATION: 100000 POWDER TOPICAL at 21:14

## 2021-01-01 RX ADMIN — CHLORHEXIDINE GLUCONATE 0.12% ORAL RINSE 15 ML: 1.2 LIQUID ORAL at 08:41

## 2021-01-01 RX ADMIN — Medication 1 PACKET: at 21:15

## 2021-01-01 RX ADMIN — ROPINIROLE HYDROCHLORIDE 4 MG: 2 TABLET, FILM COATED ORAL at 21:21

## 2021-01-01 RX ADMIN — SODIUM CHLORIDE, PRESERVATIVE FREE 10 ML: 5 INJECTION INTRAVENOUS at 21:07

## 2021-01-01 RX ADMIN — DIAZEPAM 10 MG: 5 INJECTION, SOLUTION INTRAMUSCULAR; INTRAVENOUS at 11:10

## 2021-01-01 RX ADMIN — CHLORHEXIDINE GLUCONATE 0.12% ORAL RINSE 15 ML: 1.2 LIQUID ORAL at 09:06

## 2021-01-01 RX ADMIN — DIAZEPAM 5 MG: 5 INJECTION, SOLUTION INTRAMUSCULAR; INTRAVENOUS at 06:17

## 2021-01-01 RX ADMIN — FENTANYL CITRATE 400 MCG/HR: 0.05 INJECTION, SOLUTION INTRAMUSCULAR; INTRAVENOUS at 13:46

## 2021-01-01 RX ADMIN — INSULIN HUMAN 8 UNITS: 100 INJECTION, SOLUTION PARENTERAL at 13:00

## 2021-01-01 RX ADMIN — LEVOTHYROXINE SODIUM ANHYDROUS 75 MCG: 100 INJECTION, POWDER, LYOPHILIZED, FOR SOLUTION INTRAVENOUS at 10:37

## 2021-01-01 RX ADMIN — Medication 0.3 MCG/KG/MIN: at 15:59

## 2021-01-01 RX ADMIN — WHITE PETROLATUM 57.7 %-MINERAL OIL 31.9 % EYE OINTMENT: at 13:51

## 2021-01-01 RX ADMIN — FENTANYL CITRATE 400 MCG/HR: 0.05 INJECTION, SOLUTION INTRAMUSCULAR; INTRAVENOUS at 17:42

## 2021-01-01 RX ADMIN — LEVOTHYROXINE SODIUM ANHYDROUS 100 MCG: 100 INJECTION, POWDER, LYOPHILIZED, FOR SOLUTION INTRAVENOUS at 18:02

## 2021-01-01 RX ADMIN — OXYCODONE 10 MG: 5 TABLET ORAL at 05:47

## 2021-01-01 RX ADMIN — INSULIN HUMAN 5 UNITS: 100 INJECTION, SOLUTION PARENTERAL at 11:47

## 2021-01-01 RX ADMIN — FENTANYL CITRATE 200 MCG/HR: 0.05 INJECTION, SOLUTION INTRAMUSCULAR; INTRAVENOUS at 06:49

## 2021-01-01 RX ADMIN — INSULIN HUMAN 4 UNITS: 100 INJECTION, SOLUTION PARENTERAL at 09:01

## 2021-01-01 RX ADMIN — METHYLNALTREXONE BROMIDE 12 MG: 12 INJECTION, SOLUTION SUBCUTANEOUS at 08:06

## 2021-01-01 RX ADMIN — LEVETIRACETAM 500 MG: 5 INJECTION INTRAVASCULAR at 21:02

## 2021-01-01 RX ADMIN — ACETAMINOPHEN ORAL SOLUTION 650 MG: 650 SOLUTION ORAL at 08:38

## 2021-01-01 RX ADMIN — TAZOBACTAM SODIUM AND PIPERACILLIN SODIUM 3.38 G: 375; 3 INJECTION, SOLUTION INTRAVENOUS at 08:18

## 2021-01-01 RX ADMIN — ALPRAZOLAM 0.5 MG: 0.5 TABLET ORAL at 10:07

## 2021-01-01 RX ADMIN — ENOXAPARIN SODIUM 40 MG: 40 INJECTION SUBCUTANEOUS at 08:17

## 2021-01-01 RX ADMIN — LEVETIRACETAM 250 MG: 500 INJECTION, SOLUTION INTRAVENOUS at 08:36

## 2021-01-01 RX ADMIN — ROPINIROLE HYDROCHLORIDE 4 MG: 2 TABLET, FILM COATED ORAL at 20:51

## 2021-01-01 RX ADMIN — CHLORHEXIDINE GLUCONATE 0.12% ORAL RINSE 15 ML: 1.2 LIQUID ORAL at 20:51

## 2021-01-01 RX ADMIN — INSULIN DETEMIR 20 UNITS: 100 INJECTION, SOLUTION SUBCUTANEOUS at 20:25

## 2021-01-01 RX ADMIN — ENOXAPARIN SODIUM 40 MG: 40 INJECTION SUBCUTANEOUS at 09:06

## 2021-01-01 RX ADMIN — PANTOPRAZOLE SODIUM 40 MG: 40 INJECTION, POWDER, LYOPHILIZED, FOR SOLUTION INTRAVENOUS at 20:13

## 2021-01-01 RX ADMIN — CEFTAZIDIME 2 G: 2 INJECTION, POWDER, FOR SOLUTION INTRAVENOUS at 12:07

## 2021-01-01 RX ADMIN — DEXAMETHASONE SODIUM PHOSPHATE 10 MG: 10 INJECTION, SOLUTION INTRAMUSCULAR; INTRAVENOUS at 23:05

## 2021-01-01 RX ADMIN — MIDODRINE HYDROCHLORIDE 5 MG: 5 TABLET ORAL at 18:32

## 2021-01-01 RX ADMIN — CHLORHEXIDINE GLUCONATE 0.12% ORAL RINSE 15 ML: 1.2 LIQUID ORAL at 08:58

## 2021-01-01 RX ADMIN — INSULIN DETEMIR 20 UNITS: 100 INJECTION, SOLUTION SUBCUTANEOUS at 08:08

## 2021-01-01 RX ADMIN — INSULIN HUMAN 5 UNITS: 100 INJECTION, SOLUTION PARENTERAL at 00:28

## 2021-01-01 RX ADMIN — CEFTAZIDIME 2 G: 2 INJECTION, POWDER, FOR SOLUTION INTRAVENOUS at 23:39

## 2021-01-01 RX ADMIN — INSULIN HUMAN 2 UNITS: 100 INJECTION, SOLUTION PARENTERAL at 12:14

## 2021-01-01 RX ADMIN — INSULIN HUMAN 3 UNITS: 100 INJECTION, SOLUTION PARENTERAL at 12:07

## 2021-01-01 RX ADMIN — INSULIN HUMAN 2 UNITS: 100 INJECTION, SOLUTION PARENTERAL at 05:58

## 2021-01-01 RX ADMIN — LEVOTHYROXINE SODIUM ANHYDROUS 100 MCG: 100 INJECTION, POWDER, LYOPHILIZED, FOR SOLUTION INTRAVENOUS at 10:31

## 2021-01-01 RX ADMIN — HYDROCODONE BITARTRATE AND ACETAMINOPHEN 1 TABLET: 5; 325 TABLET ORAL at 09:03

## 2021-01-01 RX ADMIN — ROPINIROLE HYDROCHLORIDE 4 MG: 2 TABLET, FILM COATED ORAL at 21:06

## 2021-01-01 RX ADMIN — INSULIN HUMAN 4 UNITS: 100 INJECTION, SOLUTION PARENTERAL at 05:19

## 2021-01-01 RX ADMIN — Medication: at 16:32

## 2021-01-01 RX ADMIN — CEFTAZIDIME 2 G: 2 INJECTION, POWDER, FOR SOLUTION INTRAVENOUS at 05:12

## 2021-01-01 RX ADMIN — INSULIN HUMAN 4 UNITS: 100 INJECTION, SOLUTION PARENTERAL at 21:04

## 2021-01-01 RX ADMIN — QUETIAPINE 50 MG: 25 TABLET, FILM COATED ORAL at 09:15

## 2021-01-01 RX ADMIN — ENOXAPARIN SODIUM 40 MG: 40 INJECTION SUBCUTANEOUS at 21:10

## 2021-01-01 RX ADMIN — METHYLPREDNISOLONE SODIUM SUCCINATE 40 MG: 40 INJECTION, POWDER, LYOPHILIZED, FOR SOLUTION INTRAMUSCULAR; INTRAVENOUS at 21:11

## 2021-01-01 RX ADMIN — VALACYCLOVIR HYDROCHLORIDE 1000 MG: 500 TABLET, FILM COATED ORAL at 21:36

## 2021-01-01 RX ADMIN — ALBUTEROL SULFATE 4 PUFF: 90 AEROSOL, METERED RESPIRATORY (INHALATION) at 07:12

## 2021-01-01 RX ADMIN — SODIUM CHLORIDE, PRESERVATIVE FREE 10 ML: 5 INJECTION INTRAVENOUS at 09:31

## 2021-01-01 RX ADMIN — MIDODRINE HYDROCHLORIDE 5 MG: 5 TABLET ORAL at 07:36

## 2021-01-01 RX ADMIN — ROPINIROLE HYDROCHLORIDE 4 MG: 2 TABLET, FILM COATED ORAL at 20:34

## 2021-01-01 RX ADMIN — ALBUTEROL SULFATE 4 PUFF: 90 AEROSOL, METERED RESPIRATORY (INHALATION) at 12:41

## 2021-01-01 RX ADMIN — MIDODRINE HYDROCHLORIDE 5 MG: 5 TABLET ORAL at 16:58

## 2021-01-01 RX ADMIN — ALBUTEROL SULFATE 4 PUFF: 90 AEROSOL, METERED RESPIRATORY (INHALATION) at 00:04

## 2021-01-01 RX ADMIN — IPRATROPIUM BROMIDE AND ALBUTEROL SULFATE 3 ML: 2.5; .5 SOLUTION RESPIRATORY (INHALATION) at 22:45

## 2021-01-01 RX ADMIN — BUMETANIDE 1 MG: 0.25 INJECTION INTRAMUSCULAR; INTRAVENOUS at 10:10

## 2021-01-01 RX ADMIN — SODIUM CHLORIDE, PRESERVATIVE FREE 10 ML: 5 INJECTION INTRAVENOUS at 21:22

## 2021-01-01 RX ADMIN — CEFEPIME 1 G: 1 INJECTION, POWDER, FOR SOLUTION INTRAMUSCULAR; INTRAVENOUS at 00:24

## 2021-01-01 RX ADMIN — CHLORHEXIDINE GLUCONATE 0.12% ORAL RINSE 15 ML: 1.2 LIQUID ORAL at 08:15

## 2021-01-01 RX ADMIN — DIAZEPAM 5 MG: 5 TABLET ORAL at 11:52

## 2021-01-01 RX ADMIN — METHYLPREDNISOLONE SODIUM SUCCINATE 60 MG: 125 INJECTION, POWDER, FOR SOLUTION INTRAMUSCULAR; INTRAVENOUS at 04:19

## 2021-01-01 RX ADMIN — SODIUM CHLORIDE, PRESERVATIVE FREE 10 ML: 5 INJECTION INTRAVENOUS at 08:41

## 2021-01-01 RX ADMIN — MAGNESIUM SULFATE HEPTAHYDRATE 4 G: 40 INJECTION, SOLUTION INTRAVENOUS at 08:58

## 2021-01-01 RX ADMIN — PANTOPRAZOLE SODIUM 40 MG: 40 INJECTION, POWDER, LYOPHILIZED, FOR SOLUTION INTRAVENOUS at 21:11

## 2021-01-01 RX ADMIN — MIDODRINE HYDROCHLORIDE 5 MG: 5 TABLET ORAL at 17:14

## 2021-01-01 RX ADMIN — ALBUTEROL SULFATE 4 PUFF: 90 AEROSOL, METERED RESPIRATORY (INHALATION) at 08:56

## 2021-01-01 RX ADMIN — PANTOPRAZOLE SODIUM 40 MG: 40 INJECTION, POWDER, LYOPHILIZED, FOR SOLUTION INTRAVENOUS at 08:15

## 2021-01-01 RX ADMIN — CEFTAZIDIME 2 G: 2 INJECTION, POWDER, FOR SOLUTION INTRAVENOUS at 20:18

## 2021-01-01 RX ADMIN — CEFTAZIDIME 1 G: 1 INJECTION, POWDER, FOR SOLUTION INTRAMUSCULAR; INTRAVENOUS at 00:16

## 2021-01-01 RX ADMIN — Medication 15 MG/HR: at 14:41

## 2021-01-01 RX ADMIN — ENOXAPARIN SODIUM 40 MG: 40 INJECTION SUBCUTANEOUS at 20:06

## 2021-01-01 RX ADMIN — Medication: at 16:16

## 2021-01-01 RX ADMIN — INSULIN HUMAN 8 UNITS: 100 INJECTION, SOLUTION PARENTERAL at 15:24

## 2021-01-01 RX ADMIN — INSULIN HUMAN 5 UNITS: 100 INJECTION, SOLUTION PARENTERAL at 00:15

## 2021-01-01 RX ADMIN — PANTOPRAZOLE SODIUM 40 MG: 40 INJECTION, POWDER, LYOPHILIZED, FOR SOLUTION INTRAVENOUS at 21:14

## 2021-01-01 RX ADMIN — LEVOTHYROXINE SODIUM ANHYDROUS 75 MCG: 100 INJECTION, POWDER, LYOPHILIZED, FOR SOLUTION INTRAVENOUS at 11:13

## 2021-01-01 RX ADMIN — HYDROCODONE BITARTRATE AND ACETAMINOPHEN 1 TABLET: 5; 325 TABLET ORAL at 23:51

## 2021-01-01 RX ADMIN — PROPOFOL 10 MCG/KG/MIN: 10 INJECTION, EMULSION INTRAVENOUS at 15:30

## 2021-01-01 RX ADMIN — REMDESIVIR 100 MG: 100 INJECTION, POWDER, LYOPHILIZED, FOR SOLUTION INTRAVENOUS at 12:07

## 2021-01-01 RX ADMIN — CHLORHEXIDINE GLUCONATE 0.12% ORAL RINSE 15 ML: 1.2 LIQUID ORAL at 08:24

## 2021-01-01 RX ADMIN — DIAZEPAM 5 MG: 5 TABLET ORAL at 04:34

## 2021-01-01 RX ADMIN — ENOXAPARIN SODIUM 40 MG: 40 INJECTION SUBCUTANEOUS at 20:57

## 2021-01-01 RX ADMIN — PANTOPRAZOLE SODIUM 40 MG: 40 INJECTION, POWDER, LYOPHILIZED, FOR SOLUTION INTRAVENOUS at 20:30

## 2021-01-01 RX ADMIN — ENOXAPARIN SODIUM 110 MG: 120 INJECTION SUBCUTANEOUS at 08:25

## 2021-01-01 RX ADMIN — VALACYCLOVIR HYDROCHLORIDE 1000 MG: 500 TABLET, FILM COATED ORAL at 11:41

## 2021-01-01 RX ADMIN — ALPRAZOLAM 0.5 MG: 0.5 TABLET ORAL at 16:11

## 2021-01-01 RX ADMIN — LEVOTHYROXINE SODIUM ANHYDROUS 100 MCG: 100 INJECTION, POWDER, LYOPHILIZED, FOR SOLUTION INTRAVENOUS at 12:03

## 2021-01-01 RX ADMIN — ROPINIROLE HYDROCHLORIDE 4 MG: 2 TABLET, FILM COATED ORAL at 20:07

## 2021-01-01 RX ADMIN — DOCUSATE SODIUM 100 MG: 50 LIQUID ORAL at 08:25

## 2021-01-01 RX ADMIN — INSULIN DETEMIR 20 UNITS: 100 INJECTION, SOLUTION SUBCUTANEOUS at 08:31

## 2021-01-01 RX ADMIN — GLYCOPYRROLATE 0.4 MG: 0.2 INJECTION INTRAMUSCULAR; INTRAVENOUS at 16:23

## 2021-01-01 RX ADMIN — OXYCODONE 10 MG: 5 TABLET ORAL at 18:13

## 2021-01-01 RX ADMIN — INSULIN HUMAN 5 UNITS: 100 INJECTION, SOLUTION PARENTERAL at 11:52

## 2021-01-01 RX ADMIN — ACETYLCYSTEINE 2 ML: 200 SOLUTION ORAL; RESPIRATORY (INHALATION) at 08:14

## 2021-01-01 RX ADMIN — CEFEPIME 1 G: 1 INJECTION, POWDER, FOR SOLUTION INTRAMUSCULAR; INTRAVENOUS at 00:03

## 2021-01-01 RX ADMIN — METHYLPREDNISOLONE SODIUM SUCCINATE 60 MG: 125 INJECTION, POWDER, FOR SOLUTION INTRAMUSCULAR; INTRAVENOUS at 08:24

## 2021-01-01 RX ADMIN — GLYCOPYRROLATE 0.2 MG: 0.4 INJECTION INTRAMUSCULAR; INTRAVENOUS at 13:41

## 2021-01-01 RX ADMIN — ALBUTEROL SULFATE 4 PUFF: 90 AEROSOL, METERED RESPIRATORY (INHALATION) at 18:38

## 2021-01-01 RX ADMIN — DIAZEPAM 5 MG: 5 INJECTION, SOLUTION INTRAMUSCULAR; INTRAVENOUS at 01:36

## 2021-01-01 RX ADMIN — CHLORHEXIDINE GLUCONATE 0.12% ORAL RINSE 15 ML: 1.2 LIQUID ORAL at 08:20

## 2021-01-01 RX ADMIN — CHLORHEXIDINE GLUCONATE 0.12% ORAL RINSE 15 ML: 1.2 LIQUID ORAL at 20:06

## 2021-01-01 RX ADMIN — LORAZEPAM 0.5 MG: 2 INJECTION INTRAMUSCULAR; INTRAVENOUS at 09:06

## 2021-01-01 RX ADMIN — INSULIN HUMAN 5 UNITS: 100 INJECTION, SOLUTION PARENTERAL at 11:28

## 2021-01-01 RX ADMIN — ALBUTEROL SULFATE 4 PUFF: 90 AEROSOL, METERED RESPIRATORY (INHALATION) at 13:41

## 2021-01-01 RX ADMIN — PROPOFOL 15 MCG/KG/MIN: 10 INJECTION, EMULSION INTRAVENOUS at 10:38

## 2021-01-01 RX ADMIN — MIDODRINE HYDROCHLORIDE 5 MG: 5 TABLET ORAL at 12:03

## 2021-01-01 RX ADMIN — FENTANYL CITRATE 300 MCG/HR: 0.05 INJECTION, SOLUTION INTRAMUSCULAR; INTRAVENOUS at 01:30

## 2021-01-01 RX ADMIN — VALACYCLOVIR HYDROCHLORIDE 1000 MG: 500 TABLET, FILM COATED ORAL at 20:07

## 2021-01-01 RX ADMIN — LORAZEPAM 1 MG: 2 INJECTION INTRAMUSCULAR; INTRAVENOUS at 15:13

## 2021-01-01 RX ADMIN — ALBUTEROL SULFATE 2.5 MG: 2.5 SOLUTION RESPIRATORY (INHALATION) at 07:22

## 2021-01-01 RX ADMIN — Medication 15 MG/HR: at 00:25

## 2021-01-01 RX ADMIN — LEVETIRACETAM 250 MG: 500 INJECTION, SOLUTION INTRAVENOUS at 21:00

## 2021-01-01 RX ADMIN — REMDESIVIR 100 MG: 100 INJECTION, POWDER, LYOPHILIZED, FOR SOLUTION INTRAVENOUS at 11:06

## 2021-01-01 RX ADMIN — CHLORHEXIDINE GLUCONATE 0.12% ORAL RINSE 15 ML: 1.2 LIQUID ORAL at 08:32

## 2021-01-01 RX ADMIN — MAGNESIUM OXIDE TAB 400 MG (241.3 MG ELEMENTAL MG) 400 MG: 400 (241.3 MG) TAB at 08:06

## 2021-01-01 RX ADMIN — INSULIN DETEMIR 20 UNITS: 100 INJECTION, SOLUTION SUBCUTANEOUS at 08:06

## 2021-01-01 RX ADMIN — Medication: at 22:53

## 2021-01-01 RX ADMIN — INSULIN HUMAN 8 UNITS: 100 INJECTION, SOLUTION PARENTERAL at 23:52

## 2021-01-01 RX ADMIN — INSULIN DETEMIR 10 UNITS: 100 INJECTION, SOLUTION SUBCUTANEOUS at 21:00

## 2021-01-01 RX ADMIN — DIAZEPAM 5 MG: 5 TABLET ORAL at 08:07

## 2021-01-01 RX ADMIN — IPRATROPIUM BROMIDE AND ALBUTEROL SULFATE 3 ML: 2.5; .5 SOLUTION RESPIRATORY (INHALATION) at 07:42

## 2021-01-01 RX ADMIN — LEVOFLOXACIN 500 MG: 5 INJECTION, SOLUTION INTRAVENOUS at 15:06

## 2021-01-01 RX ADMIN — ALPRAZOLAM 0.5 MG: 0.5 TABLET ORAL at 15:43

## 2021-01-01 RX ADMIN — NYSTATIN: 100000 POWDER TOPICAL at 21:19

## 2021-01-01 RX ADMIN — INSULIN HUMAN 8 UNITS: 100 INJECTION, SOLUTION PARENTERAL at 11:14

## 2021-01-01 RX ADMIN — FENTANYL CITRATE 300 MCG/HR: 0.05 INJECTION, SOLUTION INTRAMUSCULAR; INTRAVENOUS at 17:47

## 2021-01-01 RX ADMIN — PROPOFOL 30 MCG/KG/MIN: 10 INJECTION, EMULSION INTRAVENOUS at 15:52

## 2021-01-01 RX ADMIN — SENNOSIDES AND DOCUSATE SODIUM 2 TABLET: 8.6; 5 TABLET ORAL at 21:09

## 2021-01-01 RX ADMIN — CHLORHEXIDINE GLUCONATE 0.12% ORAL RINSE 15 ML: 1.2 LIQUID ORAL at 09:54

## 2021-01-01 RX ADMIN — INSULIN HUMAN 4 UNITS: 100 INJECTION, SOLUTION PARENTERAL at 05:46

## 2021-01-01 RX ADMIN — TAZOBACTAM SODIUM AND PIPERACILLIN SODIUM 3.38 G: 375; 3 INJECTION, SOLUTION INTRAVENOUS at 08:37

## 2021-01-01 RX ADMIN — PANTOPRAZOLE SODIUM 40 MG: 40 INJECTION, POWDER, LYOPHILIZED, FOR SOLUTION INTRAVENOUS at 21:56

## 2021-01-01 RX ADMIN — NYSTATIN: 100000 POWDER TOPICAL at 20:23

## 2021-01-01 RX ADMIN — QUETIAPINE FUMARATE 75 MG: 25 TABLET ORAL at 08:39

## 2021-01-01 RX ADMIN — Medication 30 ML: at 20:57

## 2021-01-01 RX ADMIN — MAGNESIUM OXIDE TAB 400 MG (241.3 MG ELEMENTAL MG) 400 MG: 400 (241.3 MG) TAB at 08:45

## 2021-01-01 RX ADMIN — LORAZEPAM 0.5 MG: 2 INJECTION INTRAMUSCULAR; INTRAVENOUS at 13:02

## 2021-01-01 RX ADMIN — IPRATROPIUM BROMIDE AND ALBUTEROL SULFATE 3 ML: 2.5; .5 SOLUTION RESPIRATORY (INHALATION) at 12:28

## 2021-01-01 RX ADMIN — PANTOPRAZOLE SODIUM 40 MG: 40 INJECTION, POWDER, FOR SOLUTION INTRAVENOUS at 06:17

## 2021-01-01 RX ADMIN — INSULIN HUMAN 8 UNITS: 100 INJECTION, SOLUTION PARENTERAL at 12:06

## 2021-01-01 RX ADMIN — CHLORHEXIDINE GLUCONATE 0.12% ORAL RINSE 15 ML: 1.2 LIQUID ORAL at 08:50

## 2021-01-01 RX ADMIN — METHYLPREDNISOLONE SODIUM SUCCINATE 60 MG: 125 INJECTION, POWDER, FOR SOLUTION INTRAMUSCULAR; INTRAVENOUS at 04:06

## 2021-01-01 RX ADMIN — FUROSEMIDE 40 MG: 10 INJECTION, SOLUTION INTRAVENOUS at 21:56

## 2021-01-01 RX ADMIN — DIAZEPAM 5 MG: 5 TABLET ORAL at 08:33

## 2021-01-01 RX ADMIN — VANCOMYCIN HYDROCHLORIDE 750 MG: 750 INJECTION, SOLUTION INTRAVENOUS at 04:07

## 2021-01-01 RX ADMIN — MIDODRINE HYDROCHLORIDE 5 MG: 5 TABLET ORAL at 18:02

## 2021-01-01 RX ADMIN — PANTOPRAZOLE SODIUM 40 MG: 40 INJECTION, POWDER, LYOPHILIZED, FOR SOLUTION INTRAVENOUS at 08:39

## 2021-01-01 RX ADMIN — INSULIN HUMAN 8 UNITS: 100 INJECTION, SOLUTION PARENTERAL at 18:13

## 2021-01-01 RX ADMIN — INSULIN LISPRO 3 UNITS: 100 INJECTION, SOLUTION INTRAVENOUS; SUBCUTANEOUS at 18:12

## 2021-01-01 RX ADMIN — NYSTATIN: 100000 POWDER TOPICAL at 22:19

## 2021-01-01 RX ADMIN — ALBUTEROL SULFATE 2.5 MG: 2.5 SOLUTION RESPIRATORY (INHALATION) at 07:41

## 2021-01-01 RX ADMIN — ALBUTEROL SULFATE 2 PUFF: 90 AEROSOL, METERED RESPIRATORY (INHALATION) at 12:23

## 2021-01-01 RX ADMIN — INSULIN HUMAN 12 UNITS: 100 INJECTION, SOLUTION PARENTERAL at 18:07

## 2021-01-01 RX ADMIN — QUETIAPINE FUMARATE 100 MG: 100 TABLET ORAL at 06:25

## 2021-01-01 RX ADMIN — PANTOPRAZOLE SODIUM 40 MG: 40 INJECTION, POWDER, LYOPHILIZED, FOR SOLUTION INTRAVENOUS at 08:17

## 2021-01-01 RX ADMIN — CEFTAZIDIME 2 G: 2 INJECTION, POWDER, FOR SOLUTION INTRAVENOUS at 21:14

## 2021-01-01 RX ADMIN — QUETIAPINE 50 MG: 25 TABLET, FILM COATED ORAL at 09:10

## 2021-01-01 RX ADMIN — HYDROCODONE BITARTRATE AND ACETAMINOPHEN 1 TABLET: 5; 325 TABLET ORAL at 22:07

## 2021-01-01 RX ADMIN — VECURONIUM BROMIDE 10 MG: 1 INJECTION, POWDER, LYOPHILIZED, FOR SOLUTION INTRAVENOUS at 13:23

## 2021-01-01 RX ADMIN — ROPINIROLE HYDROCHLORIDE 4 MG: 2 TABLET, FILM COATED ORAL at 20:42

## 2021-01-01 RX ADMIN — MIDODRINE HYDROCHLORIDE 5 MG: 5 TABLET ORAL at 17:49

## 2021-01-01 RX ADMIN — HYDROCODONE BITARTRATE AND ACETAMINOPHEN 1 TABLET: 5; 325 TABLET ORAL at 16:06

## 2021-01-01 RX ADMIN — CEFTAZIDIME 2 G: 2 INJECTION, POWDER, FOR SOLUTION INTRAVENOUS at 20:08

## 2021-01-01 RX ADMIN — INSULIN DETEMIR 15 UNITS: 100 INJECTION, SOLUTION SUBCUTANEOUS at 20:04

## 2021-01-01 RX ADMIN — CEFTAZIDIME 1 G: 1 INJECTION, POWDER, FOR SOLUTION INTRAMUSCULAR; INTRAVENOUS at 08:38

## 2021-01-01 RX ADMIN — VANCOMYCIN HYDROCHLORIDE 750 MG: 750 INJECTION, SOLUTION INTRAVENOUS at 04:56

## 2021-01-01 RX ADMIN — REMDESIVIR 100 MG: 100 INJECTION, POWDER, LYOPHILIZED, FOR SOLUTION INTRAVENOUS at 11:39

## 2021-01-01 RX ADMIN — INSULIN HUMAN 8 UNITS: 100 INJECTION, SOLUTION PARENTERAL at 17:46

## 2021-01-01 RX ADMIN — CEFTAZIDIME 1 G: 1 INJECTION, POWDER, FOR SOLUTION INTRAMUSCULAR; INTRAVENOUS at 08:24

## 2021-01-01 RX ADMIN — LORAZEPAM 1 MG: 2 INJECTION INTRAMUSCULAR; INTRAVENOUS at 06:06

## 2021-01-01 RX ADMIN — LEVOTHYROXINE SODIUM ANHYDROUS 75 MCG: 100 INJECTION, POWDER, LYOPHILIZED, FOR SOLUTION INTRAVENOUS at 12:04

## 2021-01-01 RX ADMIN — FENTANYL CITRATE 350 MCG/HR: 0.05 INJECTION, SOLUTION INTRAMUSCULAR; INTRAVENOUS at 10:16

## 2021-01-01 RX ADMIN — ACETAMINOPHEN ORAL SOLUTION 650 MG: 650 SOLUTION ORAL at 18:38

## 2021-01-01 RX ADMIN — ENOXAPARIN SODIUM 40 MG: 40 INJECTION SUBCUTANEOUS at 08:36

## 2021-01-01 RX ADMIN — PROPOFOL 5 MCG/KG/MIN: 10 INJECTION, EMULSION INTRAVENOUS at 22:22

## 2021-01-01 RX ADMIN — MIDODRINE HYDROCHLORIDE 5 MG: 5 TABLET ORAL at 11:14

## 2021-01-01 RX ADMIN — TAZOBACTAM SODIUM AND PIPERACILLIN SODIUM 3.38 G: 375; 3 INJECTION, SOLUTION INTRAVENOUS at 08:05

## 2021-01-01 RX ADMIN — INSULIN DETEMIR 20 UNITS: 100 INJECTION, SOLUTION SUBCUTANEOUS at 08:46

## 2021-01-01 RX ADMIN — Medication 0.02 MCG/KG/MIN: at 08:50

## 2021-01-01 RX ADMIN — QUETIAPINE 50 MG: 25 TABLET, FILM COATED ORAL at 20:59

## 2021-01-01 RX ADMIN — BUDESONIDE AND FORMOTEROL FUMARATE DIHYDRATE 2 PUFF: 160; 4.5 AEROSOL RESPIRATORY (INHALATION) at 07:13

## 2021-01-01 RX ADMIN — PANTOPRAZOLE SODIUM 40 MG: 40 INJECTION, POWDER, LYOPHILIZED, FOR SOLUTION INTRAVENOUS at 21:06

## 2021-01-01 RX ADMIN — CEFTAZIDIME 2 G: 2 INJECTION, POWDER, FOR SOLUTION INTRAVENOUS at 21:11

## 2021-01-01 RX ADMIN — SODIUM CHLORIDE, POTASSIUM CHLORIDE, SODIUM LACTATE AND CALCIUM CHLORIDE 100 ML/HR: 600; 310; 30; 20 INJECTION, SOLUTION INTRAVENOUS at 05:55

## 2021-01-01 RX ADMIN — MIDODRINE HYDROCHLORIDE 5 MG: 5 TABLET ORAL at 08:25

## 2021-01-01 RX ADMIN — CEFTAZIDIME 2 G: 2 INJECTION, POWDER, FOR SOLUTION INTRAVENOUS at 11:19

## 2021-01-01 RX ADMIN — ACETYLCYSTEINE 2 ML: 200 SOLUTION ORAL; RESPIRATORY (INHALATION) at 01:08

## 2021-01-01 RX ADMIN — INSULIN HUMAN 5 UNITS: 100 INJECTION, SOLUTION PARENTERAL at 06:20

## 2021-01-01 RX ADMIN — PANTOPRAZOLE SODIUM 40 MG: 40 INJECTION, POWDER, LYOPHILIZED, FOR SOLUTION INTRAVENOUS at 08:30

## 2021-01-01 RX ADMIN — CHLORHEXIDINE GLUCONATE 0.12% ORAL RINSE 15 ML: 1.2 LIQUID ORAL at 08:17

## 2021-01-01 RX ADMIN — DIAZEPAM 5 MG: 5 TABLET ORAL at 08:14

## 2021-01-01 RX ADMIN — LORAZEPAM 1 MG: 2 INJECTION INTRAMUSCULAR; INTRAVENOUS at 11:58

## 2021-01-01 RX ADMIN — LORAZEPAM 1 MG: 2 INJECTION INTRAMUSCULAR; INTRAVENOUS at 16:26

## 2021-01-01 RX ADMIN — NYSTATIN: 100000 POWDER TOPICAL at 11:51

## 2021-01-01 RX ADMIN — DOCUSATE SODIUM 100 MG: 50 LIQUID ORAL at 21:35

## 2021-01-01 RX ADMIN — CETIRIZINE HYDROCHLORIDE 10 MG: 10 TABLET, FILM COATED ORAL at 08:49

## 2021-01-01 RX ADMIN — ALBUTEROL SULFATE 4 PUFF: 90 AEROSOL, METERED RESPIRATORY (INHALATION) at 12:00

## 2021-01-01 RX ADMIN — SODIUM ZIRCONIUM CYCLOSILICATE 10 G: 10 POWDER, FOR SUSPENSION ORAL at 00:20

## 2021-01-01 RX ADMIN — IPRATROPIUM BROMIDE AND ALBUTEROL SULFATE 3 ML: 2.5; .5 SOLUTION RESPIRATORY (INHALATION) at 16:20

## 2021-01-01 RX ADMIN — MAGNESIUM OXIDE TAB 400 MG (241.3 MG ELEMENTAL MG) 400 MG: 400 (241.3 MG) TAB at 08:18

## 2021-01-01 RX ADMIN — MAGNESIUM SULFATE HEPTAHYDRATE 4 G: 40 INJECTION, SOLUTION INTRAVENOUS at 09:55

## 2021-01-01 RX ADMIN — ALPRAZOLAM 0.5 MG: 0.5 TABLET ORAL at 15:51

## 2021-01-01 RX ADMIN — HYDROCODONE BITARTRATE AND ACETAMINOPHEN 1 TABLET: 5; 325 TABLET ORAL at 07:36

## 2021-01-01 RX ADMIN — INSULIN HUMAN 3 UNITS: 100 INJECTION, SOLUTION PARENTERAL at 23:26

## 2021-01-01 RX ADMIN — MICAFUNGIN SODIUM 100 MG: 100 INJECTION, POWDER, LYOPHILIZED, FOR SOLUTION INTRAVENOUS at 12:16

## 2021-01-01 RX ADMIN — SENNOSIDES AND DOCUSATE SODIUM 2 TABLET: 8.6; 5 TABLET ORAL at 20:02

## 2021-01-01 RX ADMIN — Medication 13 MG/HR: at 08:50

## 2021-01-01 RX ADMIN — INSULIN HUMAN 12 UNITS: 100 INJECTION, SOLUTION PARENTERAL at 06:00

## 2021-01-01 RX ADMIN — SENNOSIDES 10 ML: 8.8 LIQUID ORAL at 23:39

## 2021-01-01 RX ADMIN — ENOXAPARIN SODIUM 40 MG: 40 INJECTION SUBCUTANEOUS at 10:41

## 2021-01-01 RX ADMIN — BUDESONIDE AND FORMOTEROL FUMARATE DIHYDRATE 2 PUFF: 160; 4.5 AEROSOL RESPIRATORY (INHALATION) at 08:00

## 2021-01-01 RX ADMIN — LORAZEPAM 2 MG: 2 INJECTION INTRAMUSCULAR; INTRAVENOUS at 10:54

## 2021-01-01 RX ADMIN — INSULIN DETEMIR 10 UNITS: 100 INJECTION, SOLUTION SUBCUTANEOUS at 09:23

## 2021-01-01 RX ADMIN — SODIUM PHOSPHATE, MONOBASIC, MONOHYDRATE AND SODIUM PHOSPHATE, DIBASIC, ANHYDROUS 15 MMOL: 276; 142 INJECTION, SOLUTION INTRAVENOUS at 09:16

## 2021-01-01 RX ADMIN — SODIUM CHLORIDE, PRESERVATIVE FREE 10 ML: 5 INJECTION INTRAVENOUS at 21:12

## 2021-01-01 RX ADMIN — SENNOSIDES 10 ML: 8.8 LIQUID ORAL at 09:50

## 2021-01-01 RX ADMIN — NYSTATIN: 100000 POWDER TOPICAL at 08:30

## 2021-01-01 RX ADMIN — DIAZEPAM 5 MG: 5 INJECTION, SOLUTION INTRAMUSCULAR; INTRAVENOUS at 10:11

## 2021-01-01 RX ADMIN — Medication 1 PACKET: at 10:22

## 2021-01-01 RX ADMIN — LORAZEPAM 1 MG: 2 INJECTION INTRAMUSCULAR; INTRAVENOUS at 16:18

## 2021-01-01 RX ADMIN — BUDESONIDE AND FORMOTEROL FUMARATE DIHYDRATE 2 PUFF: 160; 4.5 AEROSOL RESPIRATORY (INHALATION) at 07:16

## 2021-01-01 RX ADMIN — INSULIN DETEMIR 20 UNITS: 100 INJECTION, SOLUTION SUBCUTANEOUS at 09:18

## 2021-01-01 RX ADMIN — FENTANYL CITRATE 250 MCG/HR: 0.05 INJECTION, SOLUTION INTRAMUSCULAR; INTRAVENOUS at 21:05

## 2021-01-01 RX ADMIN — ONDANSETRON HYDROCHLORIDE 4 MG: 4 TABLET, FILM COATED ORAL at 21:42

## 2021-01-01 RX ADMIN — POLYETHYLENE GLYCOL 3350 17 G: 17 POWDER, FOR SOLUTION ORAL at 08:39

## 2021-01-01 RX ADMIN — CEFTAZIDIME 2 G: 2 INJECTION, POWDER, FOR SOLUTION INTRAVENOUS at 04:49

## 2021-01-01 RX ADMIN — OXYCODONE HYDROCHLORIDE 10 MG: 5 TABLET ORAL at 17:14

## 2021-01-01 RX ADMIN — ACETYLCYSTEINE 2 ML: 200 SOLUTION ORAL; RESPIRATORY (INHALATION) at 19:15

## 2021-01-01 RX ADMIN — CHLORHEXIDINE GLUCONATE 0.12% ORAL RINSE 15 ML: 1.2 LIQUID ORAL at 22:00

## 2021-01-01 RX ADMIN — BUDESONIDE AND FORMOTEROL FUMARATE DIHYDRATE 2 PUFF: 160; 4.5 AEROSOL RESPIRATORY (INHALATION) at 19:17

## 2021-01-01 RX ADMIN — QUETIAPINE 50 MG: 25 TABLET, FILM COATED ORAL at 08:32

## 2021-01-01 RX ADMIN — CHLORHEXIDINE GLUCONATE 0.12% ORAL RINSE 15 ML: 1.2 LIQUID ORAL at 20:12

## 2021-01-01 RX ADMIN — CEFTAZIDIME 1 G: 1 INJECTION, POWDER, FOR SOLUTION INTRAMUSCULAR; INTRAVENOUS at 00:14

## 2021-01-01 RX ADMIN — VANCOMYCIN HYDROCHLORIDE 750 MG: 750 INJECTION, SOLUTION INTRAVENOUS at 18:14

## 2021-01-01 RX ADMIN — INSULIN DETEMIR 20 UNITS: 100 INJECTION, SOLUTION SUBCUTANEOUS at 20:58

## 2021-01-01 RX ADMIN — SODIUM ZIRCONIUM CYCLOSILICATE 10 G: 10 POWDER, FOR SUSPENSION ORAL at 16:47

## 2021-01-01 RX ADMIN — ACETYLCYSTEINE 2 ML: 200 SOLUTION ORAL; RESPIRATORY (INHALATION) at 07:22

## 2021-01-01 RX ADMIN — INSULIN DETEMIR 20 UNITS: 100 INJECTION, SOLUTION SUBCUTANEOUS at 20:04

## 2021-01-01 RX ADMIN — SODIUM CHLORIDE, PRESERVATIVE FREE 10 ML: 5 INJECTION INTRAVENOUS at 09:03

## 2021-01-01 RX ADMIN — ACETAMINOPHEN ORAL SOLUTION 649.6 MG: 650 SOLUTION ORAL at 18:20

## 2021-01-01 RX ADMIN — IPRATROPIUM BROMIDE AND ALBUTEROL SULFATE 3 ML: 2.5; .5 SOLUTION RESPIRATORY (INHALATION) at 12:38

## 2021-01-01 RX ADMIN — CEFTAZIDIME 2 G: 2 INJECTION, POWDER, FOR SOLUTION INTRAVENOUS at 22:49

## 2021-01-01 RX ADMIN — CHLORHEXIDINE GLUCONATE 0.12% ORAL RINSE 15 ML: 1.2 LIQUID ORAL at 08:13

## 2021-01-01 RX ADMIN — FUROSEMIDE 40 MG: 10 INJECTION INTRAMUSCULAR; INTRAVENOUS at 02:27

## 2021-01-01 RX ADMIN — FENTANYL CITRATE 50 MCG/HR: 50 INJECTION INTRAVENOUS at 00:10

## 2021-01-01 RX ADMIN — PANTOPRAZOLE SODIUM 40 MG: 40 INJECTION, POWDER, LYOPHILIZED, FOR SOLUTION INTRAVENOUS at 21:00

## 2021-01-01 RX ADMIN — INSULIN HUMAN 8 UNITS: 100 INJECTION, SOLUTION PARENTERAL at 18:10

## 2021-01-01 RX ADMIN — INSULIN HUMAN 4 UNITS: 100 INJECTION, SOLUTION PARENTERAL at 04:07

## 2021-01-01 RX ADMIN — SENNOSIDES AND DOCUSATE SODIUM 2 TABLET: 8.6; 5 TABLET ORAL at 20:47

## 2021-01-01 RX ADMIN — ALBUTEROL SULFATE 4 PUFF: 90 AEROSOL, METERED RESPIRATORY (INHALATION) at 07:35

## 2021-01-01 RX ADMIN — Medication: at 01:48

## 2021-01-01 RX ADMIN — CEFEPIME 1 G: 1 INJECTION, POWDER, FOR SOLUTION INTRAMUSCULAR; INTRAVENOUS at 11:28

## 2021-01-01 RX ADMIN — ALBUTEROL SULFATE 2 PUFF: 90 AEROSOL, METERED RESPIRATORY (INHALATION) at 19:00

## 2021-01-01 RX ADMIN — INSULIN HUMAN 4 UNITS: 100 INJECTION, SOLUTION PARENTERAL at 11:28

## 2021-01-01 RX ADMIN — ACETYLCYSTEINE 2 ML: 200 SOLUTION ORAL; RESPIRATORY (INHALATION) at 07:35

## 2021-01-01 RX ADMIN — HYDROCODONE BITARTRATE AND ACETAMINOPHEN 1 TABLET: 5; 325 TABLET ORAL at 03:47

## 2021-01-01 RX ADMIN — METHYLPREDNISOLONE SODIUM SUCCINATE 60 MG: 125 INJECTION, POWDER, FOR SOLUTION INTRAMUSCULAR; INTRAVENOUS at 08:36

## 2021-01-01 RX ADMIN — MIDODRINE HYDROCHLORIDE 5 MG: 5 TABLET ORAL at 08:54

## 2021-01-01 RX ADMIN — ALBUTEROL SULFATE 4 PUFF: 90 AEROSOL, METERED RESPIRATORY (INHALATION) at 07:39

## 2021-01-01 RX ADMIN — SENNOSIDES AND DOCUSATE SODIUM 2 TABLET: 8.6; 5 TABLET ORAL at 08:48

## 2021-01-01 RX ADMIN — PREDNISONE 10 MG: 10 TABLET ORAL at 08:06

## 2021-01-01 RX ADMIN — INSULIN HUMAN 8 UNITS: 100 INJECTION, SOLUTION PARENTERAL at 17:39

## 2021-01-01 RX ADMIN — DIAZEPAM 5 MG: 5 INJECTION, SOLUTION INTRAMUSCULAR; INTRAVENOUS at 00:28

## 2021-01-01 RX ADMIN — Medication 30 ML: at 08:29

## 2021-01-01 RX ADMIN — INSULIN HUMAN 5 UNITS: 100 INJECTION, SOLUTION PARENTERAL at 11:58

## 2021-01-01 RX ADMIN — PANTOPRAZOLE SODIUM 40 MG: 40 INJECTION, POWDER, LYOPHILIZED, FOR SOLUTION INTRAVENOUS at 09:06

## 2021-01-01 RX ADMIN — CHLORHEXIDINE GLUCONATE 0.12% ORAL RINSE 15 ML: 1.2 LIQUID ORAL at 21:48

## 2021-01-01 RX ADMIN — ENOXAPARIN SODIUM 40 MG: 40 INJECTION SUBCUTANEOUS at 08:45

## 2021-01-01 RX ADMIN — MICAFUNGIN SODIUM 100 MG: 100 INJECTION, POWDER, LYOPHILIZED, FOR SOLUTION INTRAVENOUS at 11:52

## 2021-01-01 RX ADMIN — IPRATROPIUM BROMIDE AND ALBUTEROL SULFATE 3 ML: 2.5; .5 SOLUTION RESPIRATORY (INHALATION) at 18:55

## 2021-01-01 RX ADMIN — INSULIN LISPRO 3 UNITS: 100 INJECTION, SOLUTION INTRAVENOUS; SUBCUTANEOUS at 13:15

## 2021-01-01 RX ADMIN — PANTOPRAZOLE SODIUM 40 MG: 40 INJECTION, POWDER, LYOPHILIZED, FOR SOLUTION INTRAVENOUS at 08:25

## 2021-01-01 RX ADMIN — INSULIN HUMAN 10 UNITS: 100 INJECTION, SOLUTION PARENTERAL at 06:59

## 2021-01-01 RX ADMIN — ROPINIROLE HYDROCHLORIDE 4 MG: 2 TABLET, FILM COATED ORAL at 20:22

## 2021-01-01 RX ADMIN — TAZOBACTAM SODIUM AND PIPERACILLIN SODIUM 3.38 G: 375; 3 INJECTION, SOLUTION INTRAVENOUS at 00:28

## 2021-01-01 RX ADMIN — CEFTAZIDIME 2 G: 2 INJECTION, POWDER, FOR SOLUTION INTRAVENOUS at 23:17

## 2021-01-01 RX ADMIN — PROPOFOL 5 MCG/KG/MIN: 10 INJECTION, EMULSION INTRAVENOUS at 17:17

## 2021-01-01 RX ADMIN — BUDESONIDE AND FORMOTEROL FUMARATE DIHYDRATE 2 PUFF: 160; 4.5 AEROSOL RESPIRATORY (INHALATION) at 19:02

## 2021-01-01 RX ADMIN — HYDROCODONE BITARTRATE AND ACETAMINOPHEN 1 TABLET: 5; 325 TABLET ORAL at 22:03

## 2021-01-01 RX ADMIN — DEXAMETHASONE 6 MG: 4 TABLET ORAL at 08:48

## 2021-01-01 RX ADMIN — FUROSEMIDE 40 MG: 10 INJECTION INTRAMUSCULAR; INTRAVENOUS at 08:44

## 2021-01-01 RX ADMIN — IPRATROPIUM BROMIDE AND ALBUTEROL SULFATE 3 ML: 2.5; .5 SOLUTION RESPIRATORY (INHALATION) at 19:05

## 2021-01-01 RX ADMIN — CEFTAZIDIME 2 G: 2 INJECTION, POWDER, FOR SOLUTION INTRAVENOUS at 14:01

## 2021-01-01 RX ADMIN — CHLORHEXIDINE GLUCONATE 0.12% ORAL RINSE 15 ML: 1.2 LIQUID ORAL at 20:57

## 2021-01-01 RX ADMIN — CEFTAZIDIME 2 G: 2 INJECTION, POWDER, FOR SOLUTION INTRAVENOUS at 20:49

## 2021-01-01 RX ADMIN — TAZOBACTAM SODIUM AND PIPERACILLIN SODIUM 3.38 G: 375; 3 INJECTION, SOLUTION INTRAVENOUS at 15:40

## 2021-01-01 RX ADMIN — BUDESONIDE AND FORMOTEROL FUMARATE DIHYDRATE 2 PUFF: 160; 4.5 AEROSOL RESPIRATORY (INHALATION) at 19:15

## 2021-01-01 RX ADMIN — ALBUTEROL SULFATE 2 PUFF: 90 AEROSOL, METERED RESPIRATORY (INHALATION) at 16:17

## 2021-01-01 RX ADMIN — QUETIAPINE 50 MG: 25 TABLET, FILM COATED ORAL at 08:58

## 2021-01-01 RX ADMIN — METHYLPREDNISOLONE SODIUM SUCCINATE 40 MG: 40 INJECTION, POWDER, LYOPHILIZED, FOR SOLUTION INTRAMUSCULAR; INTRAVENOUS at 06:00

## 2021-01-01 RX ADMIN — ALBUMIN HUMAN 50 G: 0.25 SOLUTION INTRAVENOUS at 02:27

## 2021-01-01 RX ADMIN — SODIUM PHOSPHATE, MONOBASIC, MONOHYDRATE AND SODIUM PHOSPHATE, DIBASIC, ANHYDROUS 30 MMOL: 276; 142 INJECTION, SOLUTION INTRAVENOUS at 12:54

## 2021-01-01 RX ADMIN — ALBUTEROL SULFATE 4 PUFF: 90 AEROSOL, METERED RESPIRATORY (INHALATION) at 07:05

## 2021-01-01 RX ADMIN — FENTANYL CITRATE 300 MCG/HR: 0.05 INJECTION, SOLUTION INTRAMUSCULAR; INTRAVENOUS at 04:17

## 2021-01-01 RX ADMIN — ALBUTEROL SULFATE 4 PUFF: 90 AEROSOL, METERED RESPIRATORY (INHALATION) at 01:15

## 2021-01-01 RX ADMIN — INSULIN HUMAN 16 UNITS: 100 INJECTION, SOLUTION PARENTERAL at 00:26

## 2021-01-01 RX ADMIN — METHYLPREDNISOLONE SODIUM SUCCINATE 40 MG: 40 INJECTION, POWDER, FOR SOLUTION INTRAMUSCULAR; INTRAVENOUS at 18:02

## 2021-01-01 RX ADMIN — METHYLPREDNISOLONE SODIUM SUCCINATE 40 MG: 40 INJECTION, POWDER, LYOPHILIZED, FOR SOLUTION INTRAMUSCULAR; INTRAVENOUS at 13:46

## 2021-01-01 RX ADMIN — INSULIN HUMAN 8 UNITS: 100 INJECTION, SOLUTION PARENTERAL at 21:50

## 2021-01-01 RX ADMIN — ONDANSETRON 4 MG: 2 INJECTION INTRAMUSCULAR; INTRAVENOUS at 21:57

## 2021-01-01 RX ADMIN — NYSTATIN: 100000 POWDER TOPICAL at 08:28

## 2021-01-01 RX ADMIN — INSULIN HUMAN 2 UNITS: 100 INJECTION, SOLUTION PARENTERAL at 00:46

## 2021-01-01 RX ADMIN — CEFTAZIDIME 2 G: 2 INJECTION, POWDER, FOR SOLUTION INTRAVENOUS at 12:14

## 2021-01-01 RX ADMIN — SODIUM CHLORIDE, POTASSIUM CHLORIDE, SODIUM LACTATE AND CALCIUM CHLORIDE: 600; 310; 30; 20 INJECTION, SOLUTION INTRAVENOUS at 12:50

## 2021-01-01 RX ADMIN — PREDNISONE 30 MG: 20 TABLET ORAL at 09:55

## 2021-01-01 RX ADMIN — CEFTAZIDIME 1 G: 1 INJECTION, POWDER, FOR SOLUTION INTRAMUSCULAR; INTRAVENOUS at 15:53

## 2021-01-01 RX ADMIN — PROPOFOL 20 MCG/KG/MIN: 10 INJECTION, EMULSION INTRAVENOUS at 15:24

## 2021-01-01 RX ADMIN — SODIUM CHLORIDE, PRESERVATIVE FREE 10 ML: 5 INJECTION INTRAVENOUS at 20:04

## 2021-01-01 RX ADMIN — FENTANYL CITRATE 200 MCG/HR: 0.05 INJECTION, SOLUTION INTRAMUSCULAR; INTRAVENOUS at 20:27

## 2021-01-01 RX ADMIN — Medication 15 MG/HR: at 20:21

## 2021-01-01 RX ADMIN — ALBUTEROL SULFATE 4 PUFF: 90 AEROSOL, METERED RESPIRATORY (INHALATION) at 07:00

## 2021-01-01 RX ADMIN — LEVOTHYROXINE SODIUM ANHYDROUS 75 MCG: 100 INJECTION, POWDER, LYOPHILIZED, FOR SOLUTION INTRAVENOUS at 11:29

## 2021-01-01 RX ADMIN — INSULIN HUMAN 20 UNITS: 100 INJECTION, SOLUTION PARENTERAL at 17:50

## 2021-01-01 RX ADMIN — DEXMEDETOMIDINE HYDROCHLORIDE 0.2 MCG/KG/HR: 4 INJECTION, SOLUTION INTRAVENOUS at 20:53

## 2021-01-01 RX ADMIN — CHLORHEXIDINE GLUCONATE 0.12% ORAL RINSE 15 ML: 1.2 LIQUID ORAL at 20:22

## 2021-01-01 RX ADMIN — INSULIN DETEMIR 20 UNITS: 100 INJECTION, SOLUTION SUBCUTANEOUS at 21:14

## 2021-01-01 RX ADMIN — ALBUTEROL SULFATE 4 PUFF: 90 AEROSOL, METERED RESPIRATORY (INHALATION) at 13:53

## 2021-01-01 RX ADMIN — LEVOTHYROXINE SODIUM 100 MCG: 100 TABLET ORAL at 05:21

## 2021-01-01 RX ADMIN — MIDODRINE HYDROCHLORIDE 5 MG: 5 TABLET ORAL at 09:22

## 2021-01-01 RX ADMIN — MIDODRINE HYDROCHLORIDE 5 MG: 5 TABLET ORAL at 08:36

## 2021-01-01 RX ADMIN — CETIRIZINE HYDROCHLORIDE 10 MG: 10 TABLET, FILM COATED ORAL at 09:02

## 2021-01-01 RX ADMIN — PROPOFOL 20 MCG/KG/MIN: 10 INJECTION, EMULSION INTRAVENOUS at 00:13

## 2021-01-01 RX ADMIN — ALBUTEROL SULFATE 4 PUFF: 90 AEROSOL, METERED RESPIRATORY (INHALATION) at 13:03

## 2021-01-01 RX ADMIN — METHYLPREDNISOLONE SODIUM SUCCINATE 60 MG: 125 INJECTION, POWDER, FOR SOLUTION INTRAMUSCULAR; INTRAVENOUS at 06:11

## 2021-01-01 RX ADMIN — LEVOTHYROXINE SODIUM ANHYDROUS 75 MCG: 100 INJECTION, POWDER, LYOPHILIZED, FOR SOLUTION INTRAVENOUS at 11:01

## 2021-01-01 RX ADMIN — METHYLPREDNISOLONE SODIUM SUCCINATE 40 MG: 40 INJECTION, POWDER, LYOPHILIZED, FOR SOLUTION INTRAMUSCULAR; INTRAVENOUS at 14:13

## 2021-01-01 RX ADMIN — DOCUSATE SODIUM 50MG AND SENNOSIDES 8.6MG 2 TABLET: 8.6; 5 TABLET, FILM COATED ORAL at 20:04

## 2021-01-01 RX ADMIN — PREDNISONE 10 MG: 10 TABLET ORAL at 09:16

## 2021-01-01 RX ADMIN — INSULIN HUMAN 8 UNITS: 100 INJECTION, SOLUTION PARENTERAL at 08:26

## 2021-01-01 RX ADMIN — QUETIAPINE 50 MG: 25 TABLET, FILM COATED ORAL at 08:13

## 2021-01-01 RX ADMIN — INSULIN HUMAN 16 UNITS: 100 INJECTION, SOLUTION PARENTERAL at 17:53

## 2021-01-01 RX ADMIN — OXYCODONE 10 MG: 5 TABLET ORAL at 04:00

## 2021-01-01 RX ADMIN — CHLORHEXIDINE GLUCONATE 0.12% ORAL RINSE 15 ML: 1.2 LIQUID ORAL at 08:39

## 2021-01-01 RX ADMIN — CEFTAZIDIME 2 G: 2 INJECTION, POWDER, FOR SOLUTION INTRAVENOUS at 13:24

## 2021-01-01 RX ADMIN — PROPOFOL 25 MCG/KG/MIN: 10 INJECTION, EMULSION INTRAVENOUS at 05:58

## 2021-01-01 RX ADMIN — INSULIN HUMAN 5 UNITS: 100 INJECTION, SOLUTION PARENTERAL at 13:12

## 2021-01-01 RX ADMIN — CHLORHEXIDINE GLUCONATE 0.12% ORAL RINSE 15 ML: 1.2 LIQUID ORAL at 08:06

## 2021-01-01 RX ADMIN — INSULIN HUMAN 4 UNITS: 100 INJECTION, SOLUTION PARENTERAL at 11:58

## 2021-01-01 RX ADMIN — INSULIN HUMAN 8 UNITS: 100 INJECTION, SOLUTION PARENTERAL at 17:34

## 2021-01-01 RX ADMIN — MICAFUNGIN SODIUM 100 MG: 100 INJECTION, POWDER, LYOPHILIZED, FOR SOLUTION INTRAVENOUS at 10:36

## 2021-01-01 RX ADMIN — DEXTROSE MONOHYDRATE 50 ML: 25 INJECTION, SOLUTION INTRAVENOUS at 06:59

## 2021-01-01 RX ADMIN — BUMETANIDE 1 MG/HR: 0.25 INJECTION INTRAMUSCULAR; INTRAVENOUS at 13:08

## 2021-01-01 RX ADMIN — PROPOFOL 40 MCG/KG/MIN: 10 INJECTION, EMULSION INTRAVENOUS at 10:50

## 2021-01-01 RX ADMIN — MAGNESIUM OXIDE TAB 400 MG (241.3 MG ELEMENTAL MG) 400 MG: 400 (241.3 MG) TAB at 09:02

## 2021-01-01 RX ADMIN — BUDESONIDE AND FORMOTEROL FUMARATE DIHYDRATE 2 PUFF: 160; 4.5 AEROSOL RESPIRATORY (INHALATION) at 06:59

## 2021-01-01 RX ADMIN — LORAZEPAM 1 MG: 2 INJECTION INTRAMUSCULAR; INTRAVENOUS at 17:14

## 2021-01-01 RX ADMIN — DIAZEPAM 5 MG: 5 TABLET ORAL at 20:59

## 2021-01-01 RX ADMIN — DIAZEPAM 5 MG: 5 INJECTION, SOLUTION INTRAMUSCULAR; INTRAVENOUS at 17:40

## 2021-01-01 RX ADMIN — SODIUM CHLORIDE, PRESERVATIVE FREE 10 ML: 5 INJECTION INTRAVENOUS at 08:30

## 2021-01-01 RX ADMIN — PROPOFOL 30 MCG/KG/MIN: 10 INJECTION, EMULSION INTRAVENOUS at 09:13

## 2021-01-01 RX ADMIN — DIAZEPAM 5 MG: 5 TABLET ORAL at 13:21

## 2021-01-01 RX ADMIN — HYDROCODONE BITARTRATE AND ACETAMINOPHEN 1 TABLET: 5; 325 TABLET ORAL at 16:11

## 2021-01-01 RX ADMIN — LORAZEPAM 0.5 MG: 2 INJECTION INTRAMUSCULAR; INTRAVENOUS at 14:32

## 2021-01-01 RX ADMIN — ALBUTEROL SULFATE 4 PUFF: 90 AEROSOL, METERED RESPIRATORY (INHALATION) at 19:53

## 2021-01-01 RX ADMIN — PANTOPRAZOLE SODIUM 40 MG: 40 INJECTION, POWDER, LYOPHILIZED, FOR SOLUTION INTRAVENOUS at 09:16

## 2021-01-01 RX ADMIN — NYSTATIN 1 APPLICATION: 100000 POWDER TOPICAL at 21:38

## 2021-01-01 RX ADMIN — SODIUM CHLORIDE, PRESERVATIVE FREE 10 ML: 5 INJECTION INTRAVENOUS at 08:48

## 2021-01-01 RX ADMIN — IPRATROPIUM BROMIDE AND ALBUTEROL SULFATE 3 ML: 2.5; .5 SOLUTION RESPIRATORY (INHALATION) at 16:09

## 2021-01-01 RX ADMIN — ALBUTEROL SULFATE 4 PUFF: 90 AEROSOL, METERED RESPIRATORY (INHALATION) at 19:26

## 2021-01-01 RX ADMIN — INSULIN DETEMIR 10 UNITS: 100 INJECTION, SOLUTION SUBCUTANEOUS at 20:15

## 2021-01-01 RX ADMIN — INSULIN HUMAN 20 UNITS: 100 INJECTION, SOLUTION PARENTERAL at 05:32

## 2021-01-01 RX ADMIN — DIAZEPAM 5 MG: 5 TABLET ORAL at 14:14

## 2021-01-01 RX ADMIN — LEVOTHYROXINE SODIUM ANHYDROUS 75 MCG: 100 INJECTION, POWDER, LYOPHILIZED, FOR SOLUTION INTRAVENOUS at 10:36

## 2021-01-01 RX ADMIN — Medication: at 17:06

## 2021-01-01 RX ADMIN — MIDODRINE HYDROCHLORIDE 5 MG: 5 TABLET ORAL at 16:51

## 2021-01-01 RX ADMIN — INSULIN HUMAN 4 UNITS: 100 INJECTION, SOLUTION PARENTERAL at 00:47

## 2021-01-01 RX ADMIN — ALBUTEROL SULFATE 4 PUFF: 90 AEROSOL, METERED RESPIRATORY (INHALATION) at 07:34

## 2021-01-01 RX ADMIN — LORAZEPAM 0.5 MG: 2 INJECTION INTRAMUSCULAR; INTRAVENOUS at 11:57

## 2021-01-01 RX ADMIN — INSULIN DETEMIR 20 UNITS: 100 INJECTION, SOLUTION SUBCUTANEOUS at 08:26

## 2021-01-01 RX ADMIN — PANTOPRAZOLE SODIUM 40 MG: 40 INJECTION, POWDER, LYOPHILIZED, FOR SOLUTION INTRAVENOUS at 09:56

## 2021-01-01 RX ADMIN — ENOXAPARIN SODIUM 40 MG: 40 INJECTION SUBCUTANEOUS at 08:20

## 2021-01-01 RX ADMIN — QUETIAPINE 50 MG: 25 TABLET, FILM COATED ORAL at 09:55

## 2021-01-01 RX ADMIN — ENOXAPARIN SODIUM 40 MG: 40 INJECTION SUBCUTANEOUS at 08:24

## 2021-01-01 RX ADMIN — ALBUTEROL SULFATE 4 PUFF: 90 AEROSOL, METERED RESPIRATORY (INHALATION) at 07:30

## 2021-01-01 RX ADMIN — METHYLPREDNISOLONE SODIUM SUCCINATE 40 MG: 40 INJECTION, POWDER, LYOPHILIZED, FOR SOLUTION INTRAMUSCULAR; INTRAVENOUS at 05:21

## 2021-01-01 RX ADMIN — Medication: at 04:27

## 2021-01-01 RX ADMIN — LEVOFLOXACIN 500 MG: 5 INJECTION, SOLUTION INTRAVENOUS at 15:23

## 2021-01-01 RX ADMIN — LEVETIRACETAM 250 MG: 500 INJECTION, SOLUTION INTRAVENOUS at 22:19

## 2021-01-01 RX ADMIN — CEFTAZIDIME 2 G: 2 INJECTION, POWDER, FOR SOLUTION INTRAVENOUS at 21:55

## 2021-01-01 RX ADMIN — LORAZEPAM 2 MG: 2 INJECTION INTRAMUSCULAR; INTRAVENOUS at 10:12

## 2021-01-01 RX ADMIN — MAGNESIUM OXIDE TAB 400 MG (241.3 MG ELEMENTAL MG) 400 MG: 400 (241.3 MG) TAB at 08:49

## 2021-01-01 RX ADMIN — SODIUM CHLORIDE, PRESERVATIVE FREE 10 ML: 5 INJECTION INTRAVENOUS at 08:03

## 2021-01-01 RX ADMIN — PANTOPRAZOLE SODIUM 40 MG: 40 INJECTION, POWDER, LYOPHILIZED, FOR SOLUTION INTRAVENOUS at 08:50

## 2021-01-01 RX ADMIN — IPRATROPIUM BROMIDE AND ALBUTEROL SULFATE 3 ML: 2.5; .5 SOLUTION RESPIRATORY (INHALATION) at 23:28

## 2021-01-01 RX ADMIN — Medication: at 04:59

## 2021-01-01 RX ADMIN — Medication 0.12 MCG/KG/MIN: at 03:56

## 2021-01-01 RX ADMIN — DEXAMETHASONE SODIUM PHOSPHATE 6 MG: 4 INJECTION, SOLUTION INTRA-ARTICULAR; INTRALESIONAL; INTRAMUSCULAR; INTRAVENOUS; SOFT TISSUE at 21:56

## 2021-01-01 RX ADMIN — LORAZEPAM 1 MG: 2 INJECTION INTRAMUSCULAR; INTRAVENOUS at 00:09

## 2021-01-01 RX ADMIN — LEVOFLOXACIN 500 MG: 5 INJECTION, SOLUTION INTRAVENOUS at 15:30

## 2021-01-01 RX ADMIN — ACETYLCYSTEINE 2 ML: 200 SOLUTION ORAL; RESPIRATORY (INHALATION) at 19:24

## 2021-01-01 RX ADMIN — ENOXAPARIN SODIUM 40 MG: 40 INJECTION SUBCUTANEOUS at 08:32

## 2021-01-01 RX ADMIN — INSULIN HUMAN 4 UNITS: 100 INJECTION, SOLUTION PARENTERAL at 00:20

## 2021-01-01 RX ADMIN — LEVOTHYROXINE SODIUM 100 MCG: 100 TABLET ORAL at 05:47

## 2021-01-01 RX ADMIN — ACETYLCYSTEINE 2 ML: 200 SOLUTION ORAL; RESPIRATORY (INHALATION) at 06:23

## 2021-01-01 RX ADMIN — SODIUM CHLORIDE, PRESERVATIVE FREE 10 ML: 5 INJECTION INTRAVENOUS at 21:56

## 2021-01-01 RX ADMIN — ALBUTEROL SULFATE 4 PUFF: 90 AEROSOL, METERED RESPIRATORY (INHALATION) at 12:37

## 2021-01-01 RX ADMIN — QUETIAPINE FUMARATE 50 MG: 25 TABLET ORAL at 22:00

## 2021-01-01 RX ADMIN — INSULIN HUMAN 5 UNITS: 100 INJECTION, SOLUTION PARENTERAL at 00:47

## 2021-01-01 RX ADMIN — ALBUTEROL SULFATE 2 PUFF: 90 AEROSOL, METERED RESPIRATORY (INHALATION) at 12:30

## 2021-01-01 RX ADMIN — SODIUM CHLORIDE, PRESERVATIVE FREE 10 ML: 5 INJECTION INTRAVENOUS at 11:33

## 2021-01-01 RX ADMIN — ALBUTEROL SULFATE 2 PUFF: 90 AEROSOL, METERED RESPIRATORY (INHALATION) at 19:20

## 2021-01-01 RX ADMIN — SODIUM CHLORIDE, PRESERVATIVE FREE 10 ML: 5 INJECTION INTRAVENOUS at 08:06

## 2021-01-01 RX ADMIN — ALBUTEROL SULFATE 4 PUFF: 90 AEROSOL, METERED RESPIRATORY (INHALATION) at 07:45

## 2021-01-01 RX ADMIN — PANTOPRAZOLE SODIUM 40 MG: 40 INJECTION, POWDER, LYOPHILIZED, FOR SOLUTION INTRAVENOUS at 08:32

## 2021-01-01 RX ADMIN — ROPINIROLE HYDROCHLORIDE 5 MG: 2 TABLET, FILM COATED ORAL at 20:47

## 2021-01-01 RX ADMIN — INSULIN HUMAN 20 UNITS: 100 INJECTION, SOLUTION PARENTERAL at 14:34

## 2021-01-01 RX ADMIN — QUETIAPINE 50 MG: 25 TABLET, FILM COATED ORAL at 08:54

## 2021-01-01 RX ADMIN — Medication 15 MG/HR: at 13:10

## 2021-01-01 RX ADMIN — PREDNISONE 40 MG: 20 TABLET ORAL at 09:06

## 2021-01-01 RX ADMIN — Medication 2 MG/HR: at 22:35

## 2021-01-01 RX ADMIN — BUDESONIDE AND FORMOTEROL FUMARATE DIHYDRATE 2 PUFF: 160; 4.5 AEROSOL RESPIRATORY (INHALATION) at 07:39

## 2021-01-01 RX ADMIN — INSULIN HUMAN 5 UNITS: 100 INJECTION, SOLUTION PARENTERAL at 13:06

## 2021-01-01 RX ADMIN — SODIUM CHLORIDE, POTASSIUM CHLORIDE, SODIUM LACTATE AND CALCIUM CHLORIDE 100 ML/HR: 600; 310; 30; 20 INJECTION, SOLUTION INTRAVENOUS at 21:33

## 2021-01-01 RX ADMIN — SODIUM CHLORIDE, PRESERVATIVE FREE 10 ML: 5 INJECTION INTRAVENOUS at 09:17

## 2021-01-01 RX ADMIN — DIAZEPAM 5 MG: 5 TABLET ORAL at 20:51

## 2021-01-01 RX ADMIN — SODIUM CHLORIDE, PRESERVATIVE FREE 10 ML: 5 INJECTION INTRAVENOUS at 08:45

## 2021-01-01 RX ADMIN — MIDODRINE HYDROCHLORIDE 5 MG: 5 TABLET ORAL at 10:22

## 2021-01-01 RX ADMIN — LEVOTHYROXINE SODIUM ANHYDROUS 75 MCG: 100 INJECTION, POWDER, LYOPHILIZED, FOR SOLUTION INTRAVENOUS at 11:52

## 2021-01-01 RX ADMIN — INSULIN HUMAN 4 UNITS: 100 INJECTION, SOLUTION PARENTERAL at 05:27

## 2021-01-01 RX ADMIN — CEFTAZIDIME 2 G: 2 INJECTION, POWDER, FOR SOLUTION INTRAVENOUS at 14:49

## 2021-01-01 RX ADMIN — NYSTATIN: 100000 POWDER TOPICAL at 09:10

## 2021-01-01 RX ADMIN — QUETIAPINE FUMARATE 50 MG: 25 TABLET ORAL at 11:58

## 2021-01-01 RX ADMIN — ENOXAPARIN SODIUM 110 MG: 120 INJECTION SUBCUTANEOUS at 08:24

## 2021-01-01 RX ADMIN — DIAZEPAM 10 MG: 5 INJECTION, SOLUTION INTRAMUSCULAR; INTRAVENOUS at 16:36

## 2021-01-01 RX ADMIN — ALBUTEROL SULFATE 2 PUFF: 90 AEROSOL, METERED RESPIRATORY (INHALATION) at 07:15

## 2021-01-01 RX ADMIN — PANTOPRAZOLE SODIUM 40 MG: 40 INJECTION, POWDER, LYOPHILIZED, FOR SOLUTION INTRAVENOUS at 08:41

## 2021-01-01 RX ADMIN — CEFTAZIDIME 2 G: 2 INJECTION, POWDER, FOR SOLUTION INTRAVENOUS at 11:41

## 2021-01-01 RX ADMIN — LORAZEPAM 2 MG: 2 INJECTION INTRAMUSCULAR; INTRAVENOUS at 06:25

## 2021-01-01 RX ADMIN — INSULIN HUMAN 5 UNITS: 100 INJECTION, SOLUTION PARENTERAL at 05:36

## 2021-01-01 RX ADMIN — PANTOPRAZOLE SODIUM 40 MG: 40 INJECTION, POWDER, LYOPHILIZED, FOR SOLUTION INTRAVENOUS at 09:09

## 2021-01-01 RX ADMIN — ENOXAPARIN SODIUM 40 MG: 40 INJECTION SUBCUTANEOUS at 09:16

## 2021-01-01 RX ADMIN — MIDODRINE HYDROCHLORIDE 5 MG: 5 TABLET ORAL at 11:41

## 2021-01-01 RX ADMIN — ROPINIROLE HYDROCHLORIDE 4 MG: 2 TABLET, FILM COATED ORAL at 21:12

## 2021-01-01 RX ADMIN — DIAZEPAM 5 MG: 5 TABLET ORAL at 03:04

## 2021-01-01 RX ADMIN — INSULIN HUMAN 2 UNITS: 100 INJECTION, SOLUTION PARENTERAL at 18:11

## 2021-01-01 RX ADMIN — SODIUM CHLORIDE, PRESERVATIVE FREE 10 ML: 5 INJECTION INTRAVENOUS at 21:04

## 2021-01-01 RX ADMIN — INSULIN HUMAN 8 UNITS: 100 INJECTION, SOLUTION PARENTERAL at 06:20

## 2021-01-01 RX ADMIN — PROPOFOL 5 MCG/KG/MIN: 10 INJECTION, EMULSION INTRAVENOUS at 03:39

## 2021-01-01 RX ADMIN — DIAZEPAM 5 MG: 5 TABLET ORAL at 21:12

## 2021-01-01 RX ADMIN — LEVETIRACETAM 500 MG: 5 INJECTION INTRAVASCULAR at 09:10

## 2021-01-01 RX ADMIN — SODIUM CHLORIDE, PRESERVATIVE FREE 10 ML: 5 INJECTION INTRAVENOUS at 20:33

## 2021-01-01 RX ADMIN — ROPINIROLE HYDROCHLORIDE 5 MG: 2 TABLET, FILM COATED ORAL at 20:31

## 2021-01-01 RX ADMIN — ACETAMINOPHEN ORAL SOLUTION 650 MG: 650 SOLUTION ORAL at 03:47

## 2021-01-01 RX ADMIN — CHLORHEXIDINE GLUCONATE 0.12% ORAL RINSE 15 ML: 1.2 LIQUID ORAL at 21:16

## 2021-01-01 RX ADMIN — DIAZEPAM 5 MG: 5 INJECTION, SOLUTION INTRAMUSCULAR; INTRAVENOUS at 11:30

## 2021-01-01 RX ADMIN — LORAZEPAM 0.5 MG: 2 INJECTION INTRAMUSCULAR; INTRAVENOUS at 05:02

## 2021-01-01 RX ADMIN — DIAZEPAM 5 MG: 5 TABLET ORAL at 12:06

## 2021-01-01 RX ADMIN — INSULIN HUMAN 4 UNITS: 100 INJECTION, SOLUTION PARENTERAL at 21:11

## 2021-01-01 RX ADMIN — PANTOPRAZOLE SODIUM 40 MG: 40 INJECTION, POWDER, LYOPHILIZED, FOR SOLUTION INTRAVENOUS at 08:06

## 2021-01-01 RX ADMIN — INSULIN DETEMIR 20 UNITS: 100 INJECTION, SOLUTION SUBCUTANEOUS at 21:18

## 2021-01-01 RX ADMIN — INSULIN HUMAN 12 UNITS: 100 INJECTION, SOLUTION PARENTERAL at 02:39

## 2021-01-01 RX ADMIN — INSULIN HUMAN 8 UNITS: 100 INJECTION, SOLUTION PARENTERAL at 12:52

## 2021-01-01 RX ADMIN — ROPINIROLE HYDROCHLORIDE 4 MG: 2 TABLET, FILM COATED ORAL at 20:12

## 2021-01-01 RX ADMIN — LEVOTHYROXINE SODIUM ANHYDROUS 75 MCG: 100 INJECTION, POWDER, LYOPHILIZED, FOR SOLUTION INTRAVENOUS at 12:07

## 2021-01-01 RX ADMIN — Medication: at 15:14

## 2021-01-01 RX ADMIN — INSULIN HUMAN 4 UNITS: 100 INJECTION, SOLUTION PARENTERAL at 18:16

## 2021-01-01 RX ADMIN — DIAZEPAM 5 MG: 5 INJECTION, SOLUTION INTRAMUSCULAR; INTRAVENOUS at 11:54

## 2021-01-01 RX ADMIN — TAZOBACTAM SODIUM AND PIPERACILLIN SODIUM 3.38 G: 375; 3 INJECTION, SOLUTION INTRAVENOUS at 23:35

## 2021-01-01 RX ADMIN — SODIUM CHLORIDE, PRESERVATIVE FREE 10 ML: 5 INJECTION INTRAVENOUS at 09:06

## 2021-01-01 RX ADMIN — WHITE PETROLATUM 57.7 %-MINERAL OIL 31.9 % EYE OINTMENT: at 10:22

## 2021-01-01 RX ADMIN — ROPINIROLE HYDROCHLORIDE 4 MG: 2 TABLET, FILM COATED ORAL at 22:00

## 2021-01-01 RX ADMIN — LORAZEPAM 2 MG: 2 INJECTION INTRAMUSCULAR; INTRAVENOUS at 21:22

## 2021-01-01 RX ADMIN — INSULIN DETEMIR 20 UNITS: 100 INJECTION, SOLUTION SUBCUTANEOUS at 21:00

## 2021-01-01 RX ADMIN — ALPRAZOLAM 0.5 MG: 0.5 TABLET ORAL at 22:03

## 2021-01-01 RX ADMIN — METHYLPREDNISOLONE SODIUM SUCCINATE 60 MG: 125 INJECTION, POWDER, FOR SOLUTION INTRAMUSCULAR; INTRAVENOUS at 20:24

## 2021-01-01 RX ADMIN — INSULIN HUMAN 4 UNITS: 100 INJECTION, SOLUTION PARENTERAL at 13:05

## 2021-01-01 RX ADMIN — ALBUMIN HUMAN 25 G: 0.25 SOLUTION INTRAVENOUS at 22:55

## 2021-01-01 RX ADMIN — CHLORHEXIDINE GLUCONATE 0.12% ORAL RINSE 15 ML: 1.2 LIQUID ORAL at 09:16

## 2021-01-01 RX ADMIN — ALBUTEROL SULFATE 4 PUFF: 90 AEROSOL, METERED RESPIRATORY (INHALATION) at 07:23

## 2021-01-01 RX ADMIN — CHLORHEXIDINE GLUCONATE 0.12% ORAL RINSE 15 ML: 1.2 LIQUID ORAL at 08:07

## 2021-01-01 RX ADMIN — OLANZAPINE 10 MG: 5 TABLET, FILM COATED ORAL at 11:23

## 2021-01-01 RX ADMIN — INSULIN HUMAN 8 UNITS: 100 INJECTION, SOLUTION PARENTERAL at 12:11

## 2021-01-01 RX ADMIN — CHLORHEXIDINE GLUCONATE 0.12% ORAL RINSE 15 ML: 1.2 LIQUID ORAL at 21:19

## 2021-01-01 RX ADMIN — Medication 30 ML: at 08:46

## 2021-01-01 RX ADMIN — LEVOTHYROXINE SODIUM ANHYDROUS 75 MCG: 100 INJECTION, POWDER, LYOPHILIZED, FOR SOLUTION INTRAVENOUS at 12:52

## 2021-01-01 RX ADMIN — MAGNESIUM SULFATE HEPTAHYDRATE 4 G: 40 INJECTION, SOLUTION INTRAVENOUS at 11:40

## 2021-01-01 RX ADMIN — PANTOPRAZOLE SODIUM 40 MG: 40 INJECTION, POWDER, LYOPHILIZED, FOR SOLUTION INTRAVENOUS at 21:19

## 2021-01-01 RX ADMIN — INSULIN HUMAN 4 UNITS: 100 INJECTION, SOLUTION PARENTERAL at 05:28

## 2021-01-01 RX ADMIN — OXYCODONE 10 MG: 5 TABLET ORAL at 08:26

## 2021-01-01 RX ADMIN — ENOXAPARIN SODIUM 40 MG: 40 INJECTION SUBCUTANEOUS at 20:33

## 2021-01-01 RX ADMIN — ACETAMINOPHEN ORAL SOLUTION 650 MG: 650 SOLUTION ORAL at 08:53

## 2021-01-01 RX ADMIN — LEVOTHYROXINE SODIUM ANHYDROUS 75 MCG: 100 INJECTION, POWDER, LYOPHILIZED, FOR SOLUTION INTRAVENOUS at 10:14

## 2021-01-01 RX ADMIN — WHITE PETROLATUM 57.7 %-MINERAL OIL 31.9 % EYE OINTMENT: at 03:19

## 2021-01-01 RX ADMIN — INSULIN HUMAN 5 UNITS: 100 INJECTION, SOLUTION PARENTERAL at 17:26

## 2021-01-01 RX ADMIN — INSULIN HUMAN 5 UNITS: 100 INJECTION, SOLUTION PARENTERAL at 18:10

## 2021-01-01 RX ADMIN — INSULIN HUMAN 2 UNITS: 100 INJECTION, SOLUTION PARENTERAL at 18:13

## 2021-01-01 RX ADMIN — INSULIN HUMAN 5 UNITS: 100 INJECTION, SOLUTION PARENTERAL at 12:06

## 2021-01-01 RX ADMIN — TAZOBACTAM SODIUM AND PIPERACILLIN SODIUM 3.38 G: 375; 3 INJECTION, SOLUTION INTRAVENOUS at 17:48

## 2021-01-01 RX ADMIN — ROPINIROLE HYDROCHLORIDE 4 MG: 2 TABLET, FILM COATED ORAL at 20:58

## 2021-01-01 RX ADMIN — INSULIN HUMAN 12 UNITS: 100 INJECTION, SOLUTION PARENTERAL at 00:31

## 2021-01-01 RX ADMIN — ENOXAPARIN SODIUM 40 MG: 40 INJECTION SUBCUTANEOUS at 08:06

## 2021-01-01 RX ADMIN — LEVOTHYROXINE SODIUM ANHYDROUS 75 MCG: 100 INJECTION, POWDER, LYOPHILIZED, FOR SOLUTION INTRAVENOUS at 10:25

## 2021-01-01 RX ADMIN — MIDODRINE HYDROCHLORIDE 5 MG: 5 TABLET ORAL at 08:30

## 2021-01-01 RX ADMIN — DOCUSATE SODIUM 50MG AND SENNOSIDES 8.6MG 2 TABLET: 8.6; 5 TABLET, FILM COATED ORAL at 08:03

## 2021-01-01 RX ADMIN — BUDESONIDE AND FORMOTEROL FUMARATE DIHYDRATE 2 PUFF: 160; 4.5 AEROSOL RESPIRATORY (INHALATION) at 07:30

## 2021-01-01 RX ADMIN — ROPINIROLE HYDROCHLORIDE 4 MG: 2 TABLET, FILM COATED ORAL at 20:52

## 2021-01-01 RX ADMIN — PROPOFOL 5 MCG/KG/MIN: 10 INJECTION, EMULSION INTRAVENOUS at 02:18

## 2021-01-01 RX ADMIN — DIAZEPAM 5 MG: 5 INJECTION, SOLUTION INTRAMUSCULAR; INTRAVENOUS at 05:47

## 2021-01-01 RX ADMIN — FENTANYL CITRATE 300 MCG/HR: 0.05 INJECTION, SOLUTION INTRAMUSCULAR; INTRAVENOUS at 04:34

## 2021-01-01 RX ADMIN — PREDNISONE 40 MG: 20 TABLET ORAL at 09:08

## 2021-01-01 RX ADMIN — METHYLPREDNISOLONE SODIUM SUCCINATE 40 MG: 40 INJECTION, POWDER, LYOPHILIZED, FOR SOLUTION INTRAMUSCULAR; INTRAVENOUS at 21:01

## 2021-01-01 RX ADMIN — INSULIN HUMAN 5 UNITS: 100 INJECTION, SOLUTION PARENTERAL at 18:11

## 2021-01-01 RX ADMIN — PANTOPRAZOLE SODIUM 40 MG: 40 INJECTION, POWDER, LYOPHILIZED, FOR SOLUTION INTRAVENOUS at 08:03

## 2021-01-01 RX ADMIN — LORAZEPAM 1 MG: 2 INJECTION INTRAMUSCULAR; INTRAVENOUS at 06:24

## 2021-01-01 RX ADMIN — PANTOPRAZOLE SODIUM 40 MG: 40 INJECTION, POWDER, LYOPHILIZED, FOR SOLUTION INTRAVENOUS at 21:36

## 2021-01-01 RX ADMIN — SODIUM CHLORIDE, PRESERVATIVE FREE 10 ML: 5 INJECTION INTRAVENOUS at 20:49

## 2021-01-01 RX ADMIN — ALBUMIN HUMAN 500 ML: 0.05 INJECTION, SOLUTION INTRAVENOUS at 23:05

## 2021-01-01 RX ADMIN — CHLORHEXIDINE GLUCONATE 0.12% ORAL RINSE 15 ML: 1.2 LIQUID ORAL at 21:00

## 2021-01-01 RX ADMIN — PANTOPRAZOLE SODIUM 40 MG: 40 INJECTION, POWDER, LYOPHILIZED, FOR SOLUTION INTRAVENOUS at 20:28

## 2021-01-01 RX ADMIN — ALBUTEROL SULFATE 2.5 MG: 2.5 SOLUTION RESPIRATORY (INHALATION) at 01:27

## 2021-01-01 RX ADMIN — INSULIN HUMAN 5 UNITS: 100 INJECTION, SOLUTION PARENTERAL at 06:23

## 2021-01-01 RX ADMIN — ACETYLCYSTEINE 2 ML: 200 SOLUTION ORAL; RESPIRATORY (INHALATION) at 19:47

## 2021-01-01 RX ADMIN — BUDESONIDE AND FORMOTEROL FUMARATE DIHYDRATE 2 PUFF: 160; 4.5 AEROSOL RESPIRATORY (INHALATION) at 19:53

## 2021-01-01 RX ADMIN — ALBUTEROL SULFATE 4 PUFF: 90 AEROSOL, METERED RESPIRATORY (INHALATION) at 01:30

## 2021-01-01 RX ADMIN — ENOXAPARIN SODIUM 40 MG: 40 INJECTION SUBCUTANEOUS at 20:02

## 2021-01-01 RX ADMIN — METHYLPREDNISOLONE SODIUM SUCCINATE 60 MG: 125 INJECTION, POWDER, FOR SOLUTION INTRAMUSCULAR; INTRAVENOUS at 02:27

## 2021-01-01 RX ADMIN — CHLORHEXIDINE GLUCONATE 0.12% ORAL RINSE 15 ML: 1.2 LIQUID ORAL at 21:14

## 2021-01-01 RX ADMIN — TOCILIZUMAB 800 MG: 20 INJECTION, SOLUTION, CONCENTRATE INTRAVENOUS at 14:18

## 2021-01-01 RX ADMIN — ENOXAPARIN SODIUM 40 MG: 40 INJECTION SUBCUTANEOUS at 21:55

## 2021-01-01 RX ADMIN — CHLORHEXIDINE GLUCONATE 0.12% ORAL RINSE 15 ML: 1.2 LIQUID ORAL at 20:42

## 2021-01-01 RX ADMIN — Medication 0.01 MCG/KG/MIN: at 15:25

## 2021-01-01 RX ADMIN — INSULIN HUMAN 4 UNITS: 100 INJECTION, SOLUTION PARENTERAL at 18:11

## 2021-01-01 RX ADMIN — ALBUTEROL SULFATE 4 PUFF: 90 AEROSOL, METERED RESPIRATORY (INHALATION) at 00:09

## 2021-01-01 RX ADMIN — OXYCODONE HYDROCHLORIDE 10 MG: 5 TABLET ORAL at 19:08

## 2021-01-01 RX ADMIN — INSULIN HUMAN 4 UNITS: 100 INJECTION, SOLUTION PARENTERAL at 18:08

## 2021-01-01 RX ADMIN — INSULIN HUMAN 3 UNITS: 100 INJECTION, SOLUTION PARENTERAL at 17:55

## 2021-01-01 RX ADMIN — INSULIN HUMAN 4 UNITS: 100 INJECTION, SOLUTION PARENTERAL at 11:46

## 2021-01-01 RX ADMIN — CHLORHEXIDINE GLUCONATE 0.12% ORAL RINSE 15 ML: 1.2 LIQUID ORAL at 08:03

## 2021-01-01 RX ADMIN — OXYCODONE 10 MG: 5 TABLET ORAL at 23:48

## 2021-01-01 RX ADMIN — FENTANYL CITRATE 350 MCG/HR: 0.05 INJECTION, SOLUTION INTRAMUSCULAR; INTRAVENOUS at 20:48

## 2021-01-01 RX ADMIN — INSULIN HUMAN 8 UNITS: 100 INJECTION, SOLUTION PARENTERAL at 03:03

## 2021-01-01 RX ADMIN — INSULIN HUMAN 3 UNITS: 100 INJECTION, SOLUTION PARENTERAL at 05:54

## 2021-01-01 RX ADMIN — MIDODRINE HYDROCHLORIDE 5 MG: 5 TABLET ORAL at 10:32

## 2021-01-01 RX ADMIN — PROPOFOL 15 MCG/KG/MIN: 10 INJECTION, EMULSION INTRAVENOUS at 06:19

## 2021-01-01 RX ADMIN — INSULIN HUMAN 4 UNITS: 100 INJECTION, SOLUTION PARENTERAL at 06:46

## 2021-01-01 RX ADMIN — BUDESONIDE AND FORMOTEROL FUMARATE DIHYDRATE 2 PUFF: 160; 4.5 AEROSOL RESPIRATORY (INHALATION) at 16:16

## 2021-01-01 RX ADMIN — ALBUTEROL SULFATE 2.5 MG: 2.5 SOLUTION RESPIRATORY (INHALATION) at 01:08

## 2021-01-01 RX ADMIN — LISINOPRIL 5 MG: 5 TABLET ORAL at 09:02

## 2021-01-01 RX ADMIN — CEFTAZIDIME 2 G: 2 INJECTION, POWDER, FOR SOLUTION INTRAVENOUS at 04:12

## 2021-01-01 RX ADMIN — MIDODRINE HYDROCHLORIDE 5 MG: 5 TABLET ORAL at 12:44

## 2021-01-01 RX ADMIN — OXYCODONE 10 MG: 5 TABLET ORAL at 16:40

## 2021-01-01 RX ADMIN — INSULIN HUMAN 2 UNITS: 100 INJECTION, SOLUTION PARENTERAL at 11:05

## 2021-01-01 RX ADMIN — NYSTATIN: 100000 POWDER TOPICAL at 08:07

## 2021-01-01 RX ADMIN — ENOXAPARIN SODIUM 110 MG: 120 INJECTION SUBCUTANEOUS at 22:00

## 2021-01-01 RX ADMIN — INSULIN HUMAN 4 UNITS: 100 INJECTION, SOLUTION PARENTERAL at 12:16

## 2021-01-01 RX ADMIN — Medication: at 23:56

## 2021-01-01 RX ADMIN — CEFTAZIDIME 2 G: 2 INJECTION, POWDER, FOR SOLUTION INTRAVENOUS at 11:57

## 2021-01-01 RX ADMIN — LISINOPRIL 5 MG: 5 TABLET ORAL at 20:02

## 2021-01-01 RX ADMIN — ALBUTEROL SULFATE 4 PUFF: 90 AEROSOL, METERED RESPIRATORY (INHALATION) at 20:29

## 2021-01-01 RX ADMIN — FENTANYL CITRATE 400 MCG/HR: 0.05 INJECTION, SOLUTION INTRAMUSCULAR; INTRAVENOUS at 14:38

## 2021-01-01 RX ADMIN — VANCOMYCIN HYDROCHLORIDE 750 MG: 750 INJECTION, SOLUTION INTRAVENOUS at 16:36

## 2021-01-01 RX ADMIN — Medication 1500 MG: at 23:21

## 2021-01-01 RX ADMIN — INSULIN HUMAN 16 UNITS: 100 INJECTION, SOLUTION PARENTERAL at 12:08

## 2021-01-01 RX ADMIN — VALACYCLOVIR HYDROCHLORIDE 1000 MG: 500 TABLET, FILM COATED ORAL at 21:14

## 2021-01-01 RX ADMIN — ROPINIROLE HYDROCHLORIDE 4 MG: 2 TABLET, FILM COATED ORAL at 20:44

## 2021-01-01 RX ADMIN — MICAFUNGIN SODIUM 100 MG: 100 INJECTION, POWDER, LYOPHILIZED, FOR SOLUTION INTRAVENOUS at 11:39

## 2021-01-01 RX ADMIN — ALBUTEROL SULFATE 4 PUFF: 90 AEROSOL, METERED RESPIRATORY (INHALATION) at 03:07

## 2021-01-01 RX ADMIN — SENNOSIDES 10 ML: 8.8 LIQUID ORAL at 21:35

## 2021-01-01 RX ADMIN — ALBUTEROL SULFATE 4 PUFF: 90 AEROSOL, METERED RESPIRATORY (INHALATION) at 19:30

## 2021-01-01 RX ADMIN — Medication 0.02 MCG/KG/MIN: at 13:44

## 2021-01-01 RX ADMIN — SODIUM CHLORIDE, PRESERVATIVE FREE 10 ML: 5 INJECTION INTRAVENOUS at 08:18

## 2021-01-01 RX ADMIN — METHYLPREDNISOLONE SODIUM SUCCINATE 40 MG: 40 INJECTION, POWDER, FOR SOLUTION INTRAMUSCULAR; INTRAVENOUS at 06:17

## 2021-01-01 RX ADMIN — ENOXAPARIN SODIUM 40 MG: 40 INJECTION SUBCUTANEOUS at 10:03

## 2021-01-01 RX ADMIN — INSULIN HUMAN 12 UNITS: 100 INJECTION, SOLUTION PARENTERAL at 18:22

## 2021-01-01 RX ADMIN — INSULIN HUMAN 5 UNITS: 100 INJECTION, SOLUTION PARENTERAL at 00:48

## 2021-01-01 RX ADMIN — Medication 0.02 MCG/KG/MIN: at 05:28

## 2021-01-01 RX ADMIN — SODIUM ZIRCONIUM CYCLOSILICATE 10 G: 10 POWDER, FOR SUSPENSION ORAL at 07:00

## 2021-01-01 RX ADMIN — FENTANYL CITRATE 150 MCG/HR: 0.05 INJECTION, SOLUTION INTRAMUSCULAR; INTRAVENOUS at 02:17

## 2021-01-01 RX ADMIN — REMDESIVIR 100 MG: 100 INJECTION, POWDER, LYOPHILIZED, FOR SOLUTION INTRAVENOUS at 18:12

## 2021-01-01 RX ADMIN — MIDODRINE HYDROCHLORIDE 5 MG: 5 TABLET ORAL at 11:39

## 2021-01-01 RX ADMIN — METHYLPREDNISOLONE SODIUM SUCCINATE 60 MG: 125 INJECTION, POWDER, FOR SOLUTION INTRAMUSCULAR; INTRAVENOUS at 20:59

## 2021-01-01 RX ADMIN — INSULIN HUMAN 5 UNITS: 100 INJECTION, SOLUTION PARENTERAL at 05:19

## 2021-01-01 RX ADMIN — PANTOPRAZOLE SODIUM 40 MG: 40 INJECTION, POWDER, LYOPHILIZED, FOR SOLUTION INTRAVENOUS at 09:08

## 2021-01-01 RX ADMIN — ALBUTEROL SULFATE 4 PUFF: 90 AEROSOL, METERED RESPIRATORY (INHALATION) at 07:10

## 2021-01-01 RX ADMIN — ALBUTEROL SULFATE 4 PUFF: 90 AEROSOL, METERED RESPIRATORY (INHALATION) at 13:00

## 2021-01-01 RX ADMIN — ENOXAPARIN SODIUM 110 MG: 120 INJECTION SUBCUTANEOUS at 21:35

## 2021-01-01 RX ADMIN — Medication 15 MG/HR: at 17:39

## 2021-01-01 RX ADMIN — ALBUTEROL SULFATE 4 PUFF: 90 AEROSOL, METERED RESPIRATORY (INHALATION) at 00:48

## 2021-01-01 RX ADMIN — PANTOPRAZOLE SODIUM 40 MG: 40 INJECTION, POWDER, LYOPHILIZED, FOR SOLUTION INTRAVENOUS at 22:00

## 2021-01-01 RX ADMIN — INSULIN HUMAN 5 UNITS: 100 INJECTION, SOLUTION PARENTERAL at 17:53

## 2021-01-01 RX ADMIN — CALCIUM CHLORIDE 1 G: 100 INJECTION INTRAVENOUS; INTRAVENTRICULAR at 19:35

## 2021-01-01 RX ADMIN — INSULIN HUMAN 8 UNITS: 100 INJECTION, SOLUTION PARENTERAL at 14:12

## 2021-01-01 RX ADMIN — IPRATROPIUM BROMIDE AND ALBUTEROL SULFATE 3 ML: 2.5; .5 SOLUTION RESPIRATORY (INHALATION) at 06:22

## 2021-01-01 RX ADMIN — METHYLPREDNISOLONE SODIUM SUCCINATE 60 MG: 125 INJECTION, POWDER, FOR SOLUTION INTRAMUSCULAR; INTRAVENOUS at 16:18

## 2021-01-01 RX ADMIN — Medication: at 20:25

## 2021-01-01 RX ADMIN — LORAZEPAM 0.5 MG: 2 INJECTION INTRAMUSCULAR; INTRAVENOUS at 20:20

## 2021-01-01 RX ADMIN — FENTANYL CITRATE 400 MCG/HR: 0.05 INJECTION, SOLUTION INTRAMUSCULAR; INTRAVENOUS at 18:21

## 2021-01-01 RX ADMIN — LEVOTHYROXINE SODIUM ANHYDROUS 75 MCG: 100 INJECTION, POWDER, LYOPHILIZED, FOR SOLUTION INTRAVENOUS at 12:44

## 2021-01-01 RX ADMIN — THEOPHYLLINE 300 MG: 300 TABLET, EXTENDED RELEASE ORAL at 20:47

## 2021-01-01 RX ADMIN — INSULIN HUMAN 4 UNITS: 100 INJECTION, SOLUTION PARENTERAL at 11:52

## 2021-01-01 RX ADMIN — ALBUTEROL SULFATE 4 PUFF: 90 AEROSOL, METERED RESPIRATORY (INHALATION) at 07:15

## 2021-01-01 RX ADMIN — Medication 30 ML: at 08:30

## 2021-01-01 RX ADMIN — ROCURONIUM BROMIDE 50 MG: 10 INJECTION INTRAVENOUS at 12:55

## 2021-01-01 RX ADMIN — IPRATROPIUM BROMIDE AND ALBUTEROL SULFATE 3 ML: 2.5; .5 SOLUTION RESPIRATORY (INHALATION) at 08:14

## 2021-01-01 RX ADMIN — ALBUTEROL SULFATE 4 PUFF: 90 AEROSOL, METERED RESPIRATORY (INHALATION) at 00:33

## 2021-01-01 RX ADMIN — MICAFUNGIN SODIUM 100 MG: 100 INJECTION, POWDER, LYOPHILIZED, FOR SOLUTION INTRAVENOUS at 13:12

## 2021-01-01 RX ADMIN — INSULIN DETEMIR 20 UNITS: 100 INJECTION, SOLUTION SUBCUTANEOUS at 09:38

## 2021-01-01 RX ADMIN — INSULIN HUMAN 5 UNITS: 100 INJECTION, SOLUTION PARENTERAL at 17:35

## 2021-01-01 RX ADMIN — INSULIN HUMAN 2 UNITS: 100 INJECTION, SOLUTION PARENTERAL at 00:16

## 2021-01-01 RX ADMIN — LORAZEPAM 1 MG: 2 INJECTION INTRAMUSCULAR; INTRAVENOUS at 03:37

## 2021-01-01 RX ADMIN — SODIUM CHLORIDE, PRESERVATIVE FREE 10 ML: 5 INJECTION INTRAVENOUS at 21:10

## 2021-01-01 RX ADMIN — CETIRIZINE HYDROCHLORIDE 10 MG: 10 TABLET, FILM COATED ORAL at 20:02

## 2021-01-01 RX ADMIN — MIDODRINE HYDROCHLORIDE 5 MG: 5 TABLET ORAL at 11:36

## 2021-01-01 RX ADMIN — ROPINIROLE HYDROCHLORIDE 5 MG: 2 TABLET, FILM COATED ORAL at 21:03

## 2021-01-01 RX ADMIN — PANTOPRAZOLE SODIUM 40 MG: 40 INJECTION, POWDER, LYOPHILIZED, FOR SOLUTION INTRAVENOUS at 21:22

## 2021-01-01 RX ADMIN — QUETIAPINE 50 MG: 25 TABLET, FILM COATED ORAL at 20:51

## 2021-01-01 RX ADMIN — LEVOTHYROXINE SODIUM ANHYDROUS 75 MCG: 100 INJECTION, POWDER, LYOPHILIZED, FOR SOLUTION INTRAVENOUS at 10:58

## 2021-01-01 RX ADMIN — PANTOPRAZOLE SODIUM 40 MG: 40 INJECTION, POWDER, LYOPHILIZED, FOR SOLUTION INTRAVENOUS at 08:54

## 2021-01-01 RX ADMIN — LORAZEPAM 1 MG: 2 INJECTION INTRAMUSCULAR; INTRAVENOUS at 20:18

## 2021-01-01 RX ADMIN — MICAFUNGIN SODIUM 100 MG: 100 INJECTION, POWDER, LYOPHILIZED, FOR SOLUTION INTRAVENOUS at 12:46

## 2021-01-01 RX ADMIN — QUETIAPINE 50 MG: 25 TABLET, FILM COATED ORAL at 20:07

## 2021-01-01 RX ADMIN — LORAZEPAM 0.5 MG: 2 INJECTION INTRAMUSCULAR; INTRAVENOUS at 22:08

## 2021-01-01 RX ADMIN — INSULIN HUMAN 5 UNITS: 100 INJECTION, SOLUTION PARENTERAL at 17:45

## 2021-01-01 RX ADMIN — INSULIN HUMAN 5 UNITS: 100 INJECTION, SOLUTION PARENTERAL at 18:06

## 2021-01-01 RX ADMIN — Medication 13 MG/HR: at 03:58

## 2021-01-01 RX ADMIN — HYDROCODONE BITARTRATE AND ACETAMINOPHEN 1 TABLET: 5; 325 TABLET ORAL at 00:11

## 2021-01-01 RX ADMIN — METHYLPREDNISOLONE SODIUM SUCCINATE 60 MG: 125 INJECTION, POWDER, FOR SOLUTION INTRAMUSCULAR; INTRAVENOUS at 21:15

## 2021-01-01 RX ADMIN — INSULIN HUMAN 2 UNITS: 100 INJECTION, SOLUTION PARENTERAL at 05:48

## 2021-01-01 RX ADMIN — ENOXAPARIN SODIUM 40 MG: 40 INJECTION SUBCUTANEOUS at 09:54

## 2021-01-01 RX ADMIN — INSULIN DETEMIR 10 UNITS: 100 INJECTION, SOLUTION SUBCUTANEOUS at 08:39

## 2021-01-01 RX ADMIN — INSULIN DETEMIR 15 UNITS: 100 INJECTION, SOLUTION SUBCUTANEOUS at 08:18

## 2021-01-01 RX ADMIN — ACETAMINOPHEN ORAL SOLUTION 650 MG: 650 SOLUTION ORAL at 04:25

## 2021-01-01 RX ADMIN — CEFTAZIDIME 1 G: 1 INJECTION, POWDER, FOR SOLUTION INTRAMUSCULAR; INTRAVENOUS at 00:20

## 2021-01-01 RX ADMIN — INSULIN HUMAN 8 UNITS: 100 INJECTION, SOLUTION PARENTERAL at 11:29

## 2021-01-01 RX ADMIN — FUROSEMIDE 40 MG: 10 INJECTION INTRAMUSCULAR; INTRAVENOUS at 06:00

## 2021-01-01 RX ADMIN — MAGNESIUM OXIDE TAB 400 MG (241.3 MG ELEMENTAL MG) 400 MG: 400 (241.3 MG) TAB at 20:02

## 2021-01-01 RX ADMIN — INSULIN HUMAN 4 UNITS: 100 INJECTION, SOLUTION PARENTERAL at 23:48

## 2021-01-01 RX ADMIN — VALACYCLOVIR HYDROCHLORIDE 1000 MG: 500 TABLET, FILM COATED ORAL at 16:30

## 2021-01-01 RX ADMIN — METHYLPREDNISOLONE SODIUM SUCCINATE 40 MG: 40 INJECTION, POWDER, FOR SOLUTION INTRAMUSCULAR; INTRAVENOUS at 17:40

## 2021-01-01 RX ADMIN — ALBUTEROL SULFATE 4 PUFF: 90 AEROSOL, METERED RESPIRATORY (INHALATION) at 15:24

## 2021-01-01 RX ADMIN — ENOXAPARIN SODIUM 40 MG: 40 INJECTION SUBCUTANEOUS at 09:21

## 2021-01-01 RX ADMIN — ENOXAPARIN SODIUM 110 MG: 120 INJECTION SUBCUTANEOUS at 08:30

## 2021-01-01 RX ADMIN — MIDODRINE HYDROCHLORIDE 5 MG: 5 TABLET ORAL at 17:28

## 2021-01-01 RX ADMIN — Medication 1 PACKET: at 14:13

## 2021-01-01 RX ADMIN — INSULIN HUMAN 4 UNITS: 100 INJECTION, SOLUTION PARENTERAL at 02:51

## 2021-01-01 RX ADMIN — CEFTAZIDIME 2 G: 2 INJECTION, POWDER, FOR SOLUTION INTRAVENOUS at 21:50

## 2021-01-01 RX ADMIN — PANTOPRAZOLE SODIUM 40 MG: 40 INJECTION, POWDER, LYOPHILIZED, FOR SOLUTION INTRAVENOUS at 20:59

## 2021-01-01 RX ADMIN — REMDESIVIR 100 MG: 100 INJECTION, POWDER, LYOPHILIZED, FOR SOLUTION INTRAVENOUS at 11:16

## 2021-01-01 RX ADMIN — Medication 0.1 MCG/KG/MIN: at 05:21

## 2021-01-01 RX ADMIN — METHYLPREDNISOLONE SODIUM SUCCINATE 40 MG: 40 INJECTION, POWDER, LYOPHILIZED, FOR SOLUTION INTRAMUSCULAR; INTRAVENOUS at 22:03

## 2021-01-01 RX ADMIN — WHITE PETROLATUM 57.7 %-MINERAL OIL 31.9 % EYE OINTMENT: at 09:56

## 2021-01-01 RX ADMIN — NYSTATIN: 100000 POWDER TOPICAL at 09:03

## 2021-01-01 RX ADMIN — CEFTAZIDIME 2 G: 2 INJECTION, POWDER, FOR SOLUTION INTRAVENOUS at 21:35

## 2021-01-01 RX ADMIN — MICAFUNGIN SODIUM 100 MG: 100 INJECTION, POWDER, LYOPHILIZED, FOR SOLUTION INTRAVENOUS at 21:49

## 2021-01-01 RX ADMIN — CEFTAZIDIME 2 G: 2 INJECTION, POWDER, FOR SOLUTION INTRAVENOUS at 05:42

## 2021-01-01 RX ADMIN — CHLORHEXIDINE GLUCONATE 0.12% ORAL RINSE 15 ML: 1.2 LIQUID ORAL at 20:33

## 2021-01-01 RX ADMIN — DOCUSATE SODIUM 50MG AND SENNOSIDES 8.6MG 2 TABLET: 8.6; 5 TABLET, FILM COATED ORAL at 08:06

## 2021-01-01 RX ADMIN — METHYLPREDNISOLONE SODIUM SUCCINATE 60 MG: 125 INJECTION, POWDER, FOR SOLUTION INTRAMUSCULAR; INTRAVENOUS at 14:16

## 2021-01-01 RX ADMIN — INSULIN HUMAN 4 UNITS: 100 INJECTION, SOLUTION PARENTERAL at 09:13

## 2021-01-01 RX ADMIN — DEXTROSE MONOHYDRATE 25 G: 25 INJECTION, SOLUTION INTRAVENOUS at 12:32

## 2021-01-01 RX ADMIN — Medication 3 MG/HR: at 16:47

## 2021-01-01 RX ADMIN — DEXAMETHASONE SODIUM PHOSPHATE 6 MG: 4 INJECTION, SOLUTION INTRA-ARTICULAR; INTRALESIONAL; INTRAMUSCULAR; INTRAVENOUS; SOFT TISSUE at 08:41

## 2021-01-01 RX ADMIN — SODIUM CHLORIDE, PRESERVATIVE FREE 10 ML: 5 INJECTION INTRAVENOUS at 20:59

## 2021-01-01 RX ADMIN — Medication 30 ML: at 20:52

## 2021-01-01 RX ADMIN — NYSTATIN: 100000 POWDER TOPICAL at 21:07

## 2021-01-01 RX ADMIN — INSULIN DETEMIR 20 UNITS: 100 INJECTION, SOLUTION SUBCUTANEOUS at 09:08

## 2021-01-01 RX ADMIN — HYDROCODONE BITARTRATE AND ACETAMINOPHEN 1 TABLET: 5; 325 TABLET ORAL at 08:31

## 2021-01-01 RX ADMIN — INSULIN HUMAN 2 UNITS: 100 INJECTION, SOLUTION PARENTERAL at 09:04

## 2021-01-01 RX ADMIN — SENNOSIDES AND DOCUSATE SODIUM 2 TABLET: 8.6; 5 TABLET ORAL at 09:02

## 2021-01-01 RX ADMIN — MIDODRINE HYDROCHLORIDE 5 MG: 5 TABLET ORAL at 18:12

## 2021-01-01 RX ADMIN — SODIUM CHLORIDE, PRESERVATIVE FREE 10 ML: 5 INJECTION INTRAVENOUS at 20:32

## 2021-01-01 RX ADMIN — DEXAMETHASONE SODIUM PHOSPHATE 6 MG: 4 INJECTION, SOLUTION INTRA-ARTICULAR; INTRALESIONAL; INTRAMUSCULAR; INTRAVENOUS; SOFT TISSUE at 08:49

## 2021-01-01 RX ADMIN — DIAZEPAM 5 MG: 5 TABLET ORAL at 21:55

## 2021-01-01 RX ADMIN — FENTANYL CITRATE 150 MCG/HR: 0.05 INJECTION, SOLUTION INTRAMUSCULAR; INTRAVENOUS at 09:12

## 2021-01-01 RX ADMIN — INSULIN HUMAN 8 UNITS: 100 INJECTION, SOLUTION PARENTERAL at 18:05

## 2021-01-01 RX ADMIN — LEVOTHYROXINE SODIUM ANHYDROUS 75 MCG: 100 INJECTION, POWDER, LYOPHILIZED, FOR SOLUTION INTRAVENOUS at 10:00

## 2021-01-01 RX ADMIN — DEXTROMETHORPHAN 60 MG: 30 SUSPENSION, EXTENDED RELEASE ORAL at 09:01

## 2021-01-01 RX ADMIN — CHLORHEXIDINE GLUCONATE 0.12% ORAL RINSE 15 ML: 1.2 LIQUID ORAL at 20:59

## 2021-01-01 RX ADMIN — INSULIN HUMAN 4 UNITS: 100 INJECTION, SOLUTION PARENTERAL at 00:15

## 2021-01-01 RX ADMIN — CHLORHEXIDINE GLUCONATE 0.12% ORAL RINSE 15 ML: 1.2 LIQUID ORAL at 21:35

## 2021-01-01 RX ADMIN — Medication 15 MG/HR: at 02:55

## 2021-01-01 RX ADMIN — PANTOPRAZOLE SODIUM 40 MG: 40 INJECTION, POWDER, LYOPHILIZED, FOR SOLUTION INTRAVENOUS at 21:12

## 2021-01-01 RX ADMIN — FENTANYL CITRATE 250 MCG/HR: 0.05 INJECTION, SOLUTION INTRAMUSCULAR; INTRAVENOUS at 10:54

## 2021-01-01 RX ADMIN — PROPOFOL 10 MCG/KG/MIN: 10 INJECTION, EMULSION INTRAVENOUS at 17:39

## 2021-01-01 RX ADMIN — NYSTATIN: 100000 POWDER TOPICAL at 08:06

## 2021-01-01 RX ADMIN — Medication 12 MG/HR: at 10:38

## 2021-01-01 RX ADMIN — ALBUTEROL SULFATE 4 PUFF: 90 AEROSOL, METERED RESPIRATORY (INHALATION) at 07:03

## 2021-01-01 RX ADMIN — ALBUTEROL SULFATE 4 PUFF: 90 AEROSOL, METERED RESPIRATORY (INHALATION) at 13:28

## 2021-01-01 RX ADMIN — INSULIN HUMAN 5 UNITS: 100 INJECTION, SOLUTION PARENTERAL at 06:45

## 2021-01-01 RX ADMIN — NYSTATIN: 100000 POWDER TOPICAL at 09:17

## 2021-01-01 RX ADMIN — QUETIAPINE 50 MG: 25 TABLET, FILM COATED ORAL at 08:07

## 2021-01-01 RX ADMIN — ROPINIROLE HYDROCHLORIDE 4 MG: 2 TABLET, FILM COATED ORAL at 21:14

## 2021-01-01 RX ADMIN — MIDODRINE HYDROCHLORIDE 5 MG: 5 TABLET ORAL at 12:52

## 2021-01-01 RX ADMIN — PROPOFOL 25 MCG/KG/MIN: 10 INJECTION, EMULSION INTRAVENOUS at 03:56

## 2021-01-01 RX ADMIN — INSULIN HUMAN 8 UNITS: 100 INJECTION, SOLUTION PARENTERAL at 06:24

## 2021-01-01 RX ADMIN — ALBUTEROL SULFATE 4 PUFF: 90 AEROSOL, METERED RESPIRATORY (INHALATION) at 00:50

## 2021-01-01 RX ADMIN — FENTANYL CITRATE 400 MCG/HR: 0.05 INJECTION, SOLUTION INTRAMUSCULAR; INTRAVENOUS at 08:19

## 2021-01-01 RX ADMIN — CHLORHEXIDINE GLUCONATE 0.12% ORAL RINSE 15 ML: 1.2 LIQUID ORAL at 21:36

## 2021-01-01 RX ADMIN — MIDODRINE HYDROCHLORIDE 5 MG: 5 TABLET ORAL at 19:09

## 2021-01-01 RX ADMIN — VALACYCLOVIR HYDROCHLORIDE 1000 MG: 500 TABLET, FILM COATED ORAL at 20:08

## 2021-01-01 RX ADMIN — ROPINIROLE HYDROCHLORIDE 4 MG: 2 TABLET, FILM COATED ORAL at 21:55

## 2021-01-01 RX ADMIN — ROPINIROLE HYDROCHLORIDE 5 MG: 2 TABLET, FILM COATED ORAL at 21:09

## 2021-01-01 RX ADMIN — HYDROCODONE BITARTRATE AND ACETAMINOPHEN 1 TABLET: 5; 325 TABLET ORAL at 16:51

## 2021-01-01 RX ADMIN — LORAZEPAM 0.5 MG: 2 INJECTION INTRAMUSCULAR; INTRAVENOUS at 08:26

## 2021-01-01 RX ADMIN — Medication: at 10:16

## 2021-01-01 RX ADMIN — FENTANYL CITRATE 400 MCG/HR: 0.05 INJECTION, SOLUTION INTRAMUSCULAR; INTRAVENOUS at 01:39

## 2021-01-01 RX ADMIN — DEXTROSE MONOHYDRATE 25 ML: 25 INJECTION, SOLUTION INTRAVENOUS at 11:47

## 2021-01-01 RX ADMIN — SODIUM PHOSPHATE, MONOBASIC, MONOHYDRATE AND SODIUM PHOSPHATE, DIBASIC, ANHYDROUS 15 MMOL: 276; 142 INJECTION, SOLUTION INTRAVENOUS at 08:52

## 2021-01-01 RX ADMIN — SODIUM CHLORIDE, PRESERVATIVE FREE 10 ML: 5 INJECTION INTRAVENOUS at 20:31

## 2021-01-01 RX ADMIN — VANCOMYCIN HYDROCHLORIDE 1250 MG: 100 INJECTION, POWDER, LYOPHILIZED, FOR SOLUTION INTRAVENOUS at 09:59

## 2021-01-01 RX ADMIN — CHLORHEXIDINE GLUCONATE 0.12% ORAL RINSE 15 ML: 1.2 LIQUID ORAL at 20:24

## 2021-01-01 RX ADMIN — VANCOMYCIN HYDROCHLORIDE 1250 MG: 100 INJECTION, POWDER, LYOPHILIZED, FOR SOLUTION INTRAVENOUS at 12:02

## 2021-01-01 RX ADMIN — ALBUMIN HUMAN 12.5 G: 0.05 INJECTION, SOLUTION INTRAVENOUS at 22:19

## 2021-01-01 RX ADMIN — CHLORHEXIDINE GLUCONATE 0.12% ORAL RINSE 15 ML: 1.2 LIQUID ORAL at 09:10

## 2021-01-01 RX ADMIN — HYDROCODONE BITARTRATE AND ACETAMINOPHEN 1 TABLET: 5; 325 TABLET ORAL at 10:18

## 2021-01-01 RX ADMIN — DIAZEPAM 5 MG: 5 TABLET ORAL at 20:32

## 2021-01-01 RX ADMIN — PREDNISONE 30 MG: 20 TABLET ORAL at 08:13

## 2021-01-01 RX ADMIN — REMDESIVIR 100 MG: 100 INJECTION, POWDER, LYOPHILIZED, FOR SOLUTION INTRAVENOUS at 12:08

## 2021-01-01 RX ADMIN — NYSTATIN: 100000 POWDER TOPICAL at 08:15

## 2021-01-01 RX ADMIN — SODIUM CHLORIDE, PRESERVATIVE FREE 10 ML: 5 INJECTION INTRAVENOUS at 08:25

## 2021-01-01 RX ADMIN — INSULIN HUMAN 4 UNITS: 100 INJECTION, SOLUTION PARENTERAL at 19:09

## 2021-01-01 RX ADMIN — BUDESONIDE AND FORMOTEROL FUMARATE DIHYDRATE 2 PUFF: 160; 4.5 AEROSOL RESPIRATORY (INHALATION) at 19:41

## 2021-01-01 RX ADMIN — IPRATROPIUM BROMIDE AND ALBUTEROL SULFATE 3 ML: 2.5; .5 SOLUTION RESPIRATORY (INHALATION) at 07:34

## 2021-01-01 RX ADMIN — CETIRIZINE HYDROCHLORIDE 10 MG: 10 TABLET, FILM COATED ORAL at 08:03

## 2021-01-01 RX ADMIN — PANTOPRAZOLE SODIUM 40 MG: 40 INJECTION, POWDER, LYOPHILIZED, FOR SOLUTION INTRAVENOUS at 08:59

## 2021-01-01 RX ADMIN — CEFTAZIDIME 2 G: 2 INJECTION, POWDER, FOR SOLUTION INTRAVENOUS at 06:05

## 2021-01-01 RX ADMIN — CHLORHEXIDINE GLUCONATE 0.12% ORAL RINSE 15 ML: 1.2 LIQUID ORAL at 08:45

## 2021-01-01 RX ADMIN — ALBUMIN HUMAN 500 ML: 0.05 INJECTION, SOLUTION INTRAVENOUS at 16:46

## 2021-01-01 RX ADMIN — Medication 3 MCG/KG/MIN: at 15:56

## 2021-01-01 RX ADMIN — ALBUTEROL SULFATE 4 PUFF: 90 AEROSOL, METERED RESPIRATORY (INHALATION) at 19:10

## 2021-01-01 RX ADMIN — INSULIN HUMAN 4 UNITS: 100 INJECTION, SOLUTION PARENTERAL at 18:05

## 2021-01-01 RX ADMIN — LEVOTHYROXINE SODIUM ANHYDROUS 75 MCG: 100 INJECTION, POWDER, LYOPHILIZED, FOR SOLUTION INTRAVENOUS at 11:39

## 2021-01-01 RX ADMIN — LORAZEPAM 0.5 MG: 2 INJECTION INTRAMUSCULAR; INTRAVENOUS at 07:34

## 2021-01-01 RX ADMIN — Medication 30 ML: at 09:04

## 2021-01-01 RX ADMIN — ALPRAZOLAM 0.5 MG: 0.5 TABLET ORAL at 19:08

## 2021-01-01 RX ADMIN — FENTANYL CITRATE 400 MCG/HR: 0.05 INJECTION, SOLUTION INTRAMUSCULAR; INTRAVENOUS at 19:03

## 2021-01-01 RX ADMIN — MICAFUNGIN SODIUM 100 MG: 100 INJECTION, POWDER, LYOPHILIZED, FOR SOLUTION INTRAVENOUS at 12:51

## 2021-01-01 RX ADMIN — ALPRAZOLAM 0.5 MG: 0.5 TABLET ORAL at 09:14

## 2021-01-01 RX ADMIN — INSULIN HUMAN 5 UNITS: 100 INJECTION, SOLUTION PARENTERAL at 23:24

## 2021-01-01 RX ADMIN — ALBUTEROL SULFATE 4 PUFF: 90 AEROSOL, METERED RESPIRATORY (INHALATION) at 01:03

## 2021-01-01 RX ADMIN — FENTANYL CITRATE 400 MCG/HR: 0.05 INJECTION, SOLUTION INTRAMUSCULAR; INTRAVENOUS at 08:05

## 2021-01-01 RX ADMIN — INSULIN HUMAN 12 UNITS: 100 INJECTION, SOLUTION PARENTERAL at 00:10

## 2021-01-01 RX ADMIN — VALACYCLOVIR HYDROCHLORIDE 1000 MG: 500 TABLET, FILM COATED ORAL at 09:10

## 2021-01-01 RX ADMIN — PANTOPRAZOLE SODIUM 40 MG: 40 INJECTION, POWDER, LYOPHILIZED, FOR SOLUTION INTRAVENOUS at 02:10

## 2021-01-01 RX ADMIN — ENOXAPARIN SODIUM 40 MG: 40 INJECTION SUBCUTANEOUS at 08:54

## 2021-01-01 RX ADMIN — HYDROMORPHONE HYDROCHLORIDE 1 MG: 1 INJECTION, SOLUTION INTRAMUSCULAR; INTRAVENOUS; SUBCUTANEOUS at 14:56

## 2021-01-01 RX ADMIN — MIDODRINE HYDROCHLORIDE 5 MG: 5 TABLET ORAL at 16:40

## 2021-01-01 RX ADMIN — PROPOFOL 10 MCG/KG/MIN: 10 INJECTION, EMULSION INTRAVENOUS at 01:38

## 2021-01-01 RX ADMIN — DIAZEPAM 5 MG: 5 TABLET ORAL at 05:12

## 2021-01-01 RX ADMIN — ETOMIDATE 40 MG: 2 INJECTION, SOLUTION INTRAVENOUS at 23:48

## 2021-01-01 RX ADMIN — Medication 0.1 MCG/KG/MIN: at 02:35

## 2021-01-01 RX ADMIN — ACETAMINOPHEN 650 MG: 325 TABLET, FILM COATED ORAL at 21:42

## 2021-01-01 RX ADMIN — ROPINIROLE HYDROCHLORIDE 4 MG: 2 TABLET, FILM COATED ORAL at 21:01

## 2021-01-01 RX ADMIN — ALBUTEROL SULFATE 4 PUFF: 90 AEROSOL, METERED RESPIRATORY (INHALATION) at 01:23

## 2021-01-01 RX ADMIN — SODIUM CHLORIDE, PRESERVATIVE FREE 10 ML: 5 INJECTION INTRAVENOUS at 20:53

## 2021-01-01 RX ADMIN — ENOXAPARIN SODIUM 40 MG: 40 INJECTION SUBCUTANEOUS at 09:02

## 2021-01-01 RX ADMIN — INSULIN HUMAN 3 UNITS: 100 INJECTION, SOLUTION PARENTERAL at 05:24

## 2021-01-01 RX ADMIN — INSULIN HUMAN 4 UNITS: 100 INJECTION, SOLUTION PARENTERAL at 23:56

## 2021-01-01 RX ADMIN — Medication 0.04 MCG/KG/MIN: at 18:22

## 2021-01-01 RX ADMIN — IPRATROPIUM BROMIDE AND ALBUTEROL SULFATE 3 ML: 2.5; .5 SOLUTION RESPIRATORY (INHALATION) at 10:45

## 2021-01-01 RX ADMIN — DOCUSATE SODIUM 100 MG: 50 LIQUID ORAL at 08:39

## 2021-01-01 RX ADMIN — THEOPHYLLINE 300 MG: 300 TABLET, EXTENDED RELEASE ORAL at 20:35

## 2021-01-01 RX ADMIN — BUMETANIDE 1 MG/HR: 0.25 INJECTION INTRAMUSCULAR; INTRAVENOUS at 11:10

## 2021-01-01 RX ADMIN — DIAZEPAM 5 MG: 5 TABLET ORAL at 09:06

## 2021-01-01 RX ADMIN — PREDNISONE 20 MG: 20 TABLET ORAL at 08:07

## 2021-01-01 RX ADMIN — ALBUTEROL SULFATE 4 PUFF: 90 AEROSOL, METERED RESPIRATORY (INHALATION) at 07:04

## 2021-01-01 RX ADMIN — Medication: at 08:31

## 2021-01-01 RX ADMIN — INSULIN DETEMIR 10 UNITS: 100 INJECTION, SOLUTION SUBCUTANEOUS at 08:26

## 2021-01-01 RX ADMIN — VALACYCLOVIR HYDROCHLORIDE 1000 MG: 500 TABLET, FILM COATED ORAL at 20:42

## 2021-01-01 RX ADMIN — FENTANYL CITRATE 400 MCG/HR: 0.05 INJECTION, SOLUTION INTRAMUSCULAR; INTRAVENOUS at 06:51

## 2021-01-01 RX ADMIN — Medication 0.18 MCG/KG/MIN: at 17:40

## 2021-01-01 RX ADMIN — CEFEPIME 2 G: 2 INJECTION, POWDER, FOR SOLUTION INTRAVENOUS at 16:00

## 2021-01-01 RX ADMIN — SODIUM CHLORIDE, POTASSIUM CHLORIDE, SODIUM LACTATE AND CALCIUM CHLORIDE 100 ML/HR: 600; 310; 30; 20 INJECTION, SOLUTION INTRAVENOUS at 13:14

## 2021-01-01 RX ADMIN — DIAZEPAM 5 MG: 5 TABLET ORAL at 14:12

## 2021-01-01 RX ADMIN — INSULIN HUMAN 5 UNITS: 100 INJECTION, SOLUTION PARENTERAL at 18:12

## 2021-01-01 RX ADMIN — MIDAZOLAM 4 MG: 1 INJECTION INTRAMUSCULAR; INTRAVENOUS at 23:50

## 2021-01-01 RX ADMIN — ALBUTEROL SULFATE 2.5 MG: 2.5 SOLUTION RESPIRATORY (INHALATION) at 19:24

## 2021-01-01 RX ADMIN — SENNOSIDES 10 ML: 8.8 LIQUID ORAL at 08:29

## 2021-01-01 RX ADMIN — LEVOTHYROXINE SODIUM 100 MCG: 100 TABLET ORAL at 05:01

## 2021-01-01 RX ADMIN — INSULIN HUMAN 4 UNITS: 100 INJECTION, SOLUTION PARENTERAL at 23:58

## 2021-01-01 RX ADMIN — PROPOFOL 5 MCG/KG/MIN: 10 INJECTION, EMULSION INTRAVENOUS at 15:26

## 2021-01-01 RX ADMIN — VANCOMYCIN HYDROCHLORIDE 750 MG: 750 INJECTION, SOLUTION INTRAVENOUS at 16:02

## 2021-01-01 RX ADMIN — LORAZEPAM 0.5 MG: 2 INJECTION INTRAMUSCULAR; INTRAVENOUS at 15:26

## 2021-01-01 RX ADMIN — Medication 14 MG/HR: at 20:14

## 2021-01-01 RX ADMIN — DIAZEPAM 5 MG: 5 TABLET ORAL at 09:56

## 2021-01-01 RX ADMIN — CEFTAZIDIME 1 G: 1 INJECTION, POWDER, FOR SOLUTION INTRAMUSCULAR; INTRAVENOUS at 09:09

## 2021-01-01 RX ADMIN — CHLORHEXIDINE GLUCONATE 0.12% ORAL RINSE 15 ML: 1.2 LIQUID ORAL at 21:02

## 2021-01-01 RX ADMIN — NYSTATIN: 100000 POWDER TOPICAL at 20:13

## 2021-01-01 RX ADMIN — CHLORHEXIDINE GLUCONATE 0.12% ORAL RINSE 15 ML: 1.2 LIQUID ORAL at 09:02

## 2021-01-01 RX ADMIN — HYDROCODONE BITARTRATE AND ACETAMINOPHEN 1 TABLET: 5; 325 TABLET ORAL at 08:39

## 2021-01-01 RX ADMIN — ROPINIROLE HYDROCHLORIDE 4 MG: 2 TABLET, FILM COATED ORAL at 20:04

## 2021-01-01 RX ADMIN — ROPINIROLE HYDROCHLORIDE 4 MG: 2 TABLET, FILM COATED ORAL at 20:08

## 2021-01-01 RX ADMIN — ENOXAPARIN SODIUM 40 MG: 40 INJECTION SUBCUTANEOUS at 21:11

## 2021-01-01 RX ADMIN — INSULIN HUMAN 4 UNITS: 100 INJECTION, SOLUTION PARENTERAL at 18:20

## 2021-01-01 RX ADMIN — INSULIN DETEMIR 20 UNITS: 100 INJECTION, SOLUTION SUBCUTANEOUS at 11:13

## 2021-01-01 RX ADMIN — Medication 6 MG/HR: at 22:20

## 2021-01-01 RX ADMIN — Medication 30 ML: at 08:20

## 2021-01-01 RX ADMIN — Medication 15 MG/HR: at 08:52

## 2021-01-01 RX ADMIN — FENTANYL CITRATE 400 MCG/HR: 0.05 INJECTION, SOLUTION INTRAMUSCULAR; INTRAVENOUS at 12:56

## 2021-01-01 RX ADMIN — OXYCODONE 10 MG: 5 TABLET ORAL at 00:51

## 2021-01-01 RX ADMIN — INSULIN DETEMIR 20 UNITS: 100 INJECTION, SOLUTION SUBCUTANEOUS at 20:48

## 2021-01-01 RX ADMIN — SODIUM CHLORIDE, PRESERVATIVE FREE 10 ML: 5 INJECTION INTRAVENOUS at 08:07

## 2021-01-01 RX ADMIN — CHLORHEXIDINE GLUCONATE 0.12% ORAL RINSE 15 ML: 1.2 LIQUID ORAL at 20:31

## 2021-01-01 RX ADMIN — ALBUTEROL SULFATE 4 PUFF: 90 AEROSOL, METERED RESPIRATORY (INHALATION) at 12:13

## 2021-01-01 RX ADMIN — ALBUTEROL SULFATE 4 PUFF: 90 AEROSOL, METERED RESPIRATORY (INHALATION) at 18:57

## 2021-01-01 RX ADMIN — PREDNISONE 20 MG: 20 TABLET ORAL at 08:33

## 2021-01-01 RX ADMIN — CEFEPIME 2 G: 2 INJECTION, POWDER, FOR SOLUTION INTRAVENOUS at 12:51

## 2021-01-01 RX ADMIN — ALBUTEROL SULFATE 4 PUFF: 90 AEROSOL, METERED RESPIRATORY (INHALATION) at 07:47

## 2021-01-01 RX ADMIN — Medication: at 02:59

## 2021-01-01 RX ADMIN — INSULIN HUMAN 4 UNITS: 100 INJECTION, SOLUTION PARENTERAL at 06:03

## 2021-01-01 RX ADMIN — INSULIN HUMAN 4 UNITS: 100 INJECTION, SOLUTION PARENTERAL at 18:18

## 2021-01-01 RX ADMIN — MIDODRINE HYDROCHLORIDE 5 MG: 5 TABLET ORAL at 12:06

## 2021-01-01 RX ADMIN — ALBUTEROL SULFATE 4 PUFF: 90 AEROSOL, METERED RESPIRATORY (INHALATION) at 13:44

## 2021-01-01 RX ADMIN — OXYCODONE HYDROCHLORIDE 10 MG: 5 TABLET ORAL at 06:05

## 2021-01-01 RX ADMIN — THEOPHYLLINE 300 MG: 300 TABLET, EXTENDED RELEASE ORAL at 08:49

## 2021-01-01 RX ADMIN — PANTOPRAZOLE SODIUM 40 MG: 40 INJECTION, POWDER, LYOPHILIZED, FOR SOLUTION INTRAVENOUS at 08:37

## 2021-01-01 RX ADMIN — MORPHINE SULFATE: 1 INJECTION INTRAVENOUS at 16:20

## 2021-01-01 RX ADMIN — REMDESIVIR 200 MG: 100 INJECTION, POWDER, LYOPHILIZED, FOR SOLUTION INTRAVENOUS at 21:42

## 2021-01-01 RX ADMIN — LORAZEPAM 1 MG: 2 INJECTION INTRAMUSCULAR; INTRAVENOUS at 04:06

## 2021-01-01 RX ADMIN — NYSTATIN: 100000 POWDER TOPICAL at 08:49

## 2021-01-01 RX ADMIN — PROPOFOL 5 MCG/KG/MIN: 10 INJECTION, EMULSION INTRAVENOUS at 13:09

## 2021-01-01 RX ADMIN — SENNOSIDES 10 ML: 8.8 LIQUID ORAL at 21:21

## 2021-01-01 RX ADMIN — INSULIN DETEMIR 20 UNITS: 100 INJECTION, SOLUTION SUBCUTANEOUS at 11:35

## 2021-01-01 RX ADMIN — INSULIN HUMAN 5 UNITS: 100 INJECTION, SOLUTION PARENTERAL at 06:31

## 2021-01-01 RX ADMIN — OXYCODONE HYDROCHLORIDE 10 MG: 5 TABLET ORAL at 05:46

## 2021-01-01 RX ADMIN — QUETIAPINE 50 MG: 25 TABLET, FILM COATED ORAL at 21:06

## 2021-01-01 RX ADMIN — BUMETANIDE 2 MG: 0.25 INJECTION INTRAMUSCULAR; INTRAVENOUS at 13:00

## 2021-01-01 RX ADMIN — QUETIAPINE 50 MG: 25 TABLET, FILM COATED ORAL at 21:00

## 2021-01-01 RX ADMIN — LORAZEPAM 2 MG: 2 INJECTION INTRAMUSCULAR; INTRAVENOUS at 17:58

## 2021-01-01 RX ADMIN — QUETIAPINE 50 MG: 25 TABLET, FILM COATED ORAL at 20:33

## 2021-01-01 RX ADMIN — MICAFUNGIN SODIUM 100 MG: 100 INJECTION, POWDER, LYOPHILIZED, FOR SOLUTION INTRAVENOUS at 11:19

## 2021-01-01 RX ADMIN — ALBUTEROL SULFATE 4 PUFF: 90 AEROSOL, METERED RESPIRATORY (INHALATION) at 01:13

## 2021-01-01 RX ADMIN — ALPRAZOLAM 0.5 MG: 0.5 TABLET ORAL at 03:47

## 2021-01-01 RX ADMIN — ALBUTEROL SULFATE 4 PUFF: 90 AEROSOL, METERED RESPIRATORY (INHALATION) at 19:15

## 2021-01-01 RX ADMIN — ALBUTEROL SULFATE 4 PUFF: 90 AEROSOL, METERED RESPIRATORY (INHALATION) at 11:53

## 2021-01-01 RX ADMIN — BISACODYL 10 MG: 10 SUPPOSITORY RECTAL at 16:58

## 2021-01-01 RX ADMIN — ALBUTEROL SULFATE 2 PUFF: 90 AEROSOL, METERED RESPIRATORY (INHALATION) at 08:05

## 2021-01-08 PROBLEM — K92.1 HEMATOCHEZIA: Status: ACTIVE | Noted: 2021-01-01

## 2021-01-08 PROBLEM — R09.02 HYPOXEMIA: Status: ACTIVE | Noted: 2021-01-01

## 2021-01-08 PROBLEM — R06.00 DYSPNEA: Status: ACTIVE | Noted: 2021-01-01

## 2021-01-08 PROBLEM — R53.1 GENERALIZED WEAKNESS: Status: ACTIVE | Noted: 2021-01-01

## 2021-01-08 PROBLEM — U07.1 COVID-19 VIRUS DETECTED: Status: ACTIVE | Noted: 2021-01-01

## 2021-01-10 PROBLEM — J96.01 ACUTE RESPIRATORY FAILURE WITH HYPOXIA (HCC): Status: ACTIVE | Noted: 2021-01-01

## 2021-01-20 PROBLEM — R06.00 DYSPNEA: Status: RESOLVED | Noted: 2021-01-01 | Resolved: 2021-01-01

## 2021-01-20 PROBLEM — J93.9 PNEUMOTHORAX, LEFT: Status: ACTIVE | Noted: 2021-01-01

## 2021-01-26 PROBLEM — J93.9 PNEUMOTHORAX, UNSPECIFIED: Status: ACTIVE | Noted: 2021-01-01

## 2021-01-26 PROBLEM — J93.9 PNEUMOTHORAX, LEFT: Status: RESOLVED | Noted: 2021-01-01 | Resolved: 2021-01-01

## 2021-01-28 PROBLEM — G93.41 METABOLIC ENCEPHALOPATHY: Status: ACTIVE | Noted: 2021-01-01

## 2021-02-01 NOTE — PLAN OF CARE
Goal Outcome Evaluation:  Plan of Care Reviewed With: daughter  Progress: no change  Outcome Summary: patient remains febrile, chest tubes stripped/clots removed and L side output increased, opens eyes and restless/easily aggitated, decorticate posturing at times with arms, does not track or follow commands, blood/tan tinged secretions when suctioned, prn tylenol and pain/aggitation meds given with some relief, mary to be dc'd will attemp purewick, hemodynamically stable, no acute distress noted, will ctm closely.

## 2021-02-01 NOTE — PROCEDURES
Right Chest Tube Removal    The procedure was performed at the patient's bedside. After the procedure was explained to the patient, the dressing was removed from the current chest tube. The area was prepped with sterile cleansing solution. The retention suture was removed. R anterior chest tube was removed with immediate application of a duoderm occlusive dressing. Respiratory status unchanged. Patient tolerated the procedure without immediate complications.     Sandie West, BEVERLY, AGACNP-BC, FNP-BC   Pulmonary and Critical Care

## 2021-02-01 NOTE — PROGRESS NOTES
INTENSIVIST   PROGRESS NOTE     Hospital:  LOS: 24 days     Ms. Mei Gunter, 76 y.o. female is followed for a Chief Complaint of: Respiratory Failure      Subjective   S     Interval History:  No acute events. Fever curve is better this AM.        The patient's relevant past medical, surgical and social history were reviewed and updated in Epic as appropriate.      ROS: Unable to obtain secondary to mental status and mechanical ventilation.     Objective   O     Vitals:  Temp  Min: 99 °F (37.2 °C)  Max: 100.4 °F (38 °C)  BP  Min: 77/38  Max: 140/50  Pulse  Min: 85  Max: 110  Resp  Min: 29  Max: 38  SpO2  Min: 89 %  Max: 99 % No data recorded    Intake/Ouptut 24 hrs (7:00AM - 6:59 AM)  Intake & Output (last 3 days)       01/29 0701 - 01/30 0700 01/30 0701 - 01/31 0700 01/31 0701 - 02/01 0700 02/01 0701 - 02/02 0700    I.V. (mL/kg) 341 (3) 186.4 (1.7) 317 (2.8)     Blood        Other 934 245 350     NG/GT 1345 443 498     IV Piggyback 400 399 400     Total Intake(mL/kg) 3020 (27) 1273.4 (11.4) 1565 (14)     Urine (mL/kg/hr) 1990 (0.7) 1250 (0.5) 780 (0.3)     Emesis/NG output  30      Chest Tube 12 28 194     Total Output 2002 1308 974     Net +1018 -34.6 +591                   Medications (drips):  HYDROmorphone 1 mg/ml, Last Rate: 1 mg/hr (02/01/21 0600)  norepinephrine, Last Rate: Stopped (01/30/21 0656)        Mechanical Ventilator Settings:          Resp Rate (Set): 26     FiO2 (%): 50 %  PEEP/CPAP (cm H2O): 5 cm H20    Minute Ventilation (L/min) (Obs): 11.7 L/min  Resp Rate (Observed) Vent: 34  I:E Ratio (Set): 1:0.58  I:E Ratio (Obs): 1:1.20    PIP Observed (cm H2O): 32 cm H2O  Plateau Pressure (cm H2O): 0 cm H2O    Physical Examination  Telemetry:  Normal sinus rhythm.    Constitutional:  No acute distress.  Trach in place on mechanical ventilation.    Eyes: No scleral icterus.   PERRL, EOM intact.    Neck:  Supple, FROM   Cardiovascular: Normal rate, regular and rhythm. Normal heart sounds.  No murmurs,  gallop or rub.   Respiratory: No respiratory distress. Normal respiratory effort.  Diminished bilaterally. No wheezing.     Abdominal:  Soft. No masses. Nontender. No distension. No HSM.   Extremities: No digital cyanosis. No clubbing.  No peripheral edema.   Skin: No rashes, lesions or ulcers   Neurological:   Moves eyes and extremities.   Does not follow commands.             Results from last 7 days   Lab Units 02/01/21  0737 01/31/21  0316 01/30/21  0353   WBC 10*3/mm3 7.51 8.21 8.19   HEMOGLOBIN g/dL 7.7* 7.9* 8.4*   MCV fL 91.5 96.0 102.0*   PLATELETS 10*3/mm3 299 295 234     Results from last 7 days   Lab Units 02/01/21  0332 01/31/21  0316 01/30/21  0353   SODIUM mmol/L 137 137 138   POTASSIUM mmol/L 4.5 4.5 4.5   CO2 mmol/L 22.0 23.0 20.0*   CREATININE mg/dL 0.54* 0.58 0.58   GLUCOSE mg/dL 141* 97 105*   MAGNESIUM mg/dL 2.1 2.4 1.9   PHOSPHORUS mg/dL 3.2 2.6 2.9     Estimated Creatinine Clearance: 74.7 mL/min (A) (by C-G formula based on SCr of 0.54 mg/dL (L)).  Results from last 7 days   Lab Units 02/01/21  0332 01/31/21  0316 01/30/21  0353   ALK PHOS U/L 55 44 44   BILIRUBIN mg/dL 0.3 0.3 0.3   ALT (SGPT) U/L 18 19 20   AST (SGOT) U/L 21 20 26             Images:  Imaging Results (Last 24 Hours)     Procedure Component Value Units Date/Time    XR Chest 1 View [[id number]] Collected: 02/01/21 0921     Updated: 02/01/21 0923    Narrative:      EXAMINATION: XR CHEST 1 VW- 02/01/2021     INDICATION: J96.01-Acute respiratory failure with hypoxia;  U07.1-COVID-19; J12.82-Pneumonia due to Coronavirus disease 2019;  J93.9-Pneumothorax, unspecified; J93.9-Pneumothorax, unspecified      COMPARISON: 01/31/2021     FINDINGS: Patchy airspace disease seen diffusely throughout the lung  fields bilaterally. Bilateral chest tubes in place. There is a  tracheostomy tube in satisfactory position above the frankie.  Degenerative changes seen within the spine. Small bilateral pleural  effusions.          Impression:       Patchy airspace disease seen stable throughout the lung  fields bilaterally. Bilateral chest tubes in place with no pneumothorax.  Tracheostomy tube in satisfactory position.     D:  02/01/2021  E:  02/01/2021               Results: Reviewed.  I reviewed the patient's new laboratory and imaging results.  I independently reviewed the patient's new images.    Medications: Reviewed.    Assessment/Plan   A / P     Ms. Gunter is a 75yo F with a history of COPD, DM, hypothyroidism and hypertension who presented to [facility] on 1/8/21 with shortness of air and weakness after testing positive for COVID-19 on 1/4/21. She was exposed to COVID through her son. She was placed on Remdesivir, Decadron and Lovenox and given empiric antibiotics. She was transferred to the ICU on 1/10/21 for worsening hypoxia and was intubated. On 1/18/21, sedation was weaned but she then developed disconjugate gaze. She underwent imaging of the head which was negative for stroke and bleeding. She was started on Precedex but this was discontinued when she developed severe hypotension. On 1/20/21, she developed a fever. C. Diff was negative. Sputum culture showed Klebsiella and Stenotrophomonas. She was then started on Cefepime and Vancomycin by ID and Micafungin for yeast dermatitis. A left sided chest tube was placed on 1/20/21 for a left pneumothorax. She developed a spontaneous pneumothorax on the right on 1/25/21 and a chest tube was placed emergently. She underwent a trach/PEG on 1/27/21. Neurology was consulted on 1/29/21 for persistent encephalopathy. EEG was performed and showed generalized slowing but no seizure activity.     Despite antibiotic therapy, she has continued to have a fever. Antibiotics to stop today. Seroquel has been discontinued as of 1/31 in case of drug fever. She has remained on a Dilaudid drip.       Nutrition:   NPO Diet  Advance Directives:   Code Status and Medical Interventions:   Ordered at: 01/08/21 6452     Level Of  Support Discussed With:    Patient     Code Status:    CPR     Medical Interventions (Level of Support Prior to Arrest):    Full       Active Hospital Problems    Diagnosis   • **Acute respiratory failure with hypoxia, status post tracheostomy on 1/27/2021   • Metabolic encephalopathy, likely multifactorial   • Pneumothorax, bilateral   • COVID-19 virus detected   • Disease of thyroid gland   • Diabetes mellitus (CMS/HCC)   • Hypertension       Assessment / Plan:    Continue mechanical ventilation. PST as tolerated.   Antibiotics to stop today. Continue to monitor.   Clamp right chest tube. CXR at noon. If no recurrence of pneumothorax, plan to d/c.   Continue left chest tube due to increased output.   Monitor fever off Seroquel.   D/c mary catheter  Increase tube feeds to goal as tolerated.   Neurology has signed off.   Continue current insulin regimen.   Continue low-dose midodrine.   AM labs and CXR    High level of risk due to:  severe exacerbation of chronic illness and illness with threat to life or bodily function.    Plan of care and goals reviewed during interdisciplinary rounds.  I discussed the patient's findings and my recommendations with nursing staff        Aubrie Vincent,     Intensive Care Medicine and Pulmonary Medicine

## 2021-02-01 NOTE — PROGRESS NOTES
INFECTIOUS DISEASE CONSULT/INITIAL HOSPITAL VISIT    Mei Gunter  1945  [id number]    Date of Consult: 2/1/2021    Admission Date: 1/8/2021      Requesting Provider: Dmitry Hernandez MD  Evaluating Physician: Harry Tran III, MD    Reason for Consultation: COVID-19 acute respiratory failure     History of present illness:    Patient is a 76 y.o. female with h/o COPD, T2DM, hypothyroidism, and HTN who presented to [facility] ED on 1/8/21 with increasing shortness of breath, weakness, fever, and dark black diarrhea.  The patient had shortness of breath and cough that started about 10 days ago with fever about 5 days ago of greater than 100F.  She tested positive for COVID-19 on or about 1/4/21 and caught COVID-19 from her son.  She was placed on Keflex by her PCP for possible bronchitis.  She also had nausea, vomiting, diarrhea and was waiting for Cdiff toxin test result.  She was having up to 5 to 10 stools a day and towards the latter part of the diarrhea, it had become sticky and slimy.  She denies dysuria or rashes.  Work up showed Tmax 100.8, worsening hypoxia now requiring 70% FiO2 45L O2 by high flow oxygen, A1C 6.8, lactic acid 1.2, D-dimer 1.8-->1.52, WBC 5700 with 86% neutrophils, Ferritin 334-->334, CRP 19.1-->31.75, -->520, proBNP 492, PCT 0.13, , and creatinine 1.02. A respiratory panel PCR is positive for COVID-19.  Blood cultures are pending. A CXR shows diffuse multifocal airspace disease.  She is currently on Remdesivir, Dexamethasone, and Lovenox.  ID was asked to evaluate and manage her antimicrobial therapy.     1/10/2021 patient in transfer the intensive care unit because of worsening hypoxia is currently intubated and sedated review of systems are unobtainable  1/11/21; patient is critically ill on pressor, vent, sedated fio2 45%; ros unobtainable  1/12/21; no events overnight; off of pressors, on vent; on tube feeds; no fever, ros unobtainable  1/13/21; on increasing fio2, sedation,  on vent, on pressors; started on zosyn.  On intermittent paralytics    1/14/21; patient is critically ill on vent peep 14, low dose pressors, sedated ros unobtainable    1/15/2021 patient is on low-dose Levophed likely for decreased vascular tone from sedation; patient is not requiring paralytics; FiO2 is down to 40%; patient is afebrile and hemodynamically stable with the exception of low-dose hypotension.  Review of systems are unobtainable    1/18/2021 patient on FiO2 of 40% propofol turned off patient remains on fentanyl Versed opens eyes to noxious stimulation afebrile normotensive  1/19/21; patient remains on vent, peep at 8, tolerating tube feeds; sedated ros unobtainable; afebrile, hemodynamically stable.  1/20/21; has fever, elevation of wbc, good response to bumex yesterday; on fio2 50%; started on levophed drip for hypotension. Remains critically ill. Had left sided pneumothorax treated with chest tube (pigtail drain)    1/21/2021; having low-grade temperatures remains intubated sedated on fentanyl drip given Ativan as needed for agitation restlessness; review of systems are unobtainable patient remains critically ill    1/22/21; no events overnight; afebrile, patient now on versed, fentanyl, remains intubated on fio2 40%; ros unobtainable    1/23/2021 patient is critically ill afebrile hemodynamically stable remains on FiO2 sedation is difficult patient is receiving as needed Ativan patient has been on Bumex drip review of systems are unobtainable    1/24/21; patient remains critically ill; on vent, on dilaudid drip for sedation  abx changed to ceftazidime last night;   Has low grade fevers on fio2 40%.  Ros unobtainable    1/25/2021 patient now has right-sided pneumothorax had chest tube placed today patient sedated on ventilator is hemodynamically stable has low-grade temperatures review of systems are unobtainable    1/26/21; no events overnight; remains critically ill remains on vent, sedation, ros  unobtainable    1/27/21; patient remains critically ill; on vent; returned from the OR for trach and peg today; has been diuresed today patient sedated on FiO2 40% no distress has been off of airborne precautions now    1/28/2021 patient remains on Dilaudid drip plan is to decrease this sedation patient is on low-dose of Levophed.  Patient is confused not following commands patient remains on vent receiving blood products today    1/29/21 patient is off of Dilaudid drip is resting quietly on ventilator patient opens eyes to voice but cannot follow commands    2/1/21; patient has fever now; is on fio2 50% resting quietly; on dilaudid drip.      Review of systems are unobtainable  Past Medical History:   Diagnosis Date   • Asthma    • COPD (chronic obstructive pulmonary disease) (CMS/HCC)    • Diabetes mellitus (CMS/[facility])    • Disease of thyroid gland    • Hypertension    • Restless leg syndrome    • Seasonal allergies        Past Surgical History:   Procedure Laterality Date   • BLADDER REPAIR     • CLAVICLE SURGERY     • HERNIA REPAIR     • HYSTERECTOMY     • RECTAL PROLAPSE REPAIR     • TRACHEOSTOMY AND PEG TUBE INSERTION N/A 1/27/2021    Procedure: TRACHEOSTOMY AND PERCUTANEOUS ENDOSCOPIC GASTROSTOMY TUBE INSERTION;  Surgeon: Eliud Dumont MD;  Location: [facility];  Service: General;  Laterality: N/A;   • TUBAL ABDOMINAL LIGATION         Family History   Problem Relation Age of Onset   • Diabetes Mother    • Asthma Mother    • Diabetes Father    • COPD Father        Social History     Socioeconomic History   • Marital status:      Spouse name: Not on file   • Number of children: Not on file   • Years of education: Not on file   • Highest education level: Not on file   Tobacco Use   • Smoking status: Never Smoker   • Smokeless tobacco: Never Used   Substance and Sexual Activity   • Alcohol use: Never     Frequency: Never   • Drug use: Never   • Sexual activity: Never       Allergies   Allergen Reactions    • Actonel [Risedronate] Myalgia   • Hydrocodone Nausea Only   • Levaquin [Levofloxacin] Myalgia   • Other Myalgia     Ebista: legs hurt   • Stadol [Butorphanol] Other (See Comments)     Lowers blood pressure           Medication:    Current Facility-Administered Medications:   •  [DISCONTINUED] acetaminophen (TYLENOL) tablet 650 mg, 650 mg, Oral, Q4H PRN, 650 mg at 01/08/21 2142 **OR** acetaminophen (TYLENOL) 160 MG/5ML solution 650 mg, 650 mg, Per G Tube, Q4H PRN, 650 mg at 02/01/21 0347 **OR** acetaminophen (TYLENOL) suppository 650 mg, 650 mg, Rectal, Q4H PRN, Inez Alonzo, PharmD  •  albuterol sulfate HFA (PROVENTIL HFA;VENTOLIN HFA;PROAIR HFA) inhaler 4 puff, 4 puff, Inhalation, Q6H - RT, Eliud Dumont MD, 4 puff at 02/01/21 0704  •  ALPRAZolam (XANAX) tablet 0.5 mg, 0.5 mg, Oral, TID PRN, Elo Todd MD, 0.5 mg at 02/01/21 0347  •  artificial tears ophthalmic ointment, , Both Eyes, Q1H PRN, Eliud Dumont MD, Given at 01/23/21 0319  •  bisacodyl (DULCOLAX) suppository 10 mg, 10 mg, Rectal, Daily PRN, Eliud Dumont MD  •  cefTAZidime (FORTAZ) 2 g/100 mL 0.9% NS IVPB (mpb), 2 g, Intravenous, Q8H, Harry Tran MD, 2 g at 02/01/21 1119  •  chlorhexidine (PERIDEX) 0.12 % solution 15 mL, 15 mL, Mouth/Throat, Q12H, Eliud Dumont MD, 15 mL at 02/01/21 0825  •  dextrose (D50W) 25 g/ 50mL Intravenous Solution 25 g, 25 g, Intravenous, Q15 Min PRN, Eliud Dumont MD, 25 g at 01/30/21 1721  •  enoxaparin (LOVENOX) syringe 40 mg, 40 mg, Subcutaneous, Q24H, Andrea Burgos MD, 40 mg at 02/01/21 0824  •  HYDROcodone-acetaminophen (NORCO) 5-325 MG per tablet 1 tablet, 1 tablet, Oral, Q6H PRN, Elo Todd MD, 1 tablet at 02/01/21 1018  •  HYDROmorphone (DILAUDID) INFUSION (PCA 1 mg/mL syringe), 0.2-3 mg/hr, Intravenous, Titrated, Andrea Burgos MD, Last Rate: 1 mL/hr at 02/01/21 0600, 1 mg/hr at 02/01/21 0600  •  influenza vac split quad  (FLUZONE,FLUARIX,AFLURIA,FLULAVAL) injection 0.5 mL, 0.5 mL, Intramuscular, During Hospitalization, Eliud Dumont MD  •  insulin detemir (LEVEMIR) injection 20 Units, 20 Units, Subcutaneous, Q12H, Eliud Dumont MD, 20 Units at 02/01/21 0832  •  insulin regular (humuLIN R,novoLIN R) injection 0-24 Units, 0-24 Units, Subcutaneous, Q6H, Eliud Dumont MD, 4 Units at 02/01/21 0527  •  insulin regular (humuLIN R,novoLIN R) injection 5 Units, 5 Units, Subcutaneous, Q6H, Eliud Dumont MD, 5 Units at 02/01/21 0527  •  Levothyroxine Sodium injection 75 mcg, 75 mcg, Intravenous, Daily, Eliud Dumont MD, 75 mcg at 02/01/21 1119  •  LORazepam (ATIVAN) injection 0.5 mg, 0.5 mg, Intravenous, Q4H PRN, Andrea Burgos MD, 0.5 mg at 01/31/21 1526  •  Magnesium Sulfate 2 gram Bolus, followed by 8 gram infusion (total Mg dose 10 grams)- Mg less than or equal to 1mg/dL, 2 g, Intravenous, PRN **OR** Magnesium Sulfate 2 gram / 50mL Infusion (GIVE X 3 BAGS TO EQUAL 6GM TOTAL DOSE) - Mg 1.1 - 1.5 mg/dl, 2 g, Intravenous, PRN **OR** Magnesium Sulfate 4 gram infusion- Mg 1.6-1.9 mg/dL, 4 g, Intravenous, PRN, Eliud Dumont MD, Last Rate: 25 mL/hr at 01/30/21 0858, 4 g at 01/30/21 0858  •  micafungin 100 mg/100 mL 0.9% NS IVPB (mbp), 100 mg, Intravenous, Q24H, Harry Tran MD, 100 mg at 02/01/21 1119  •  midodrine (PROAMATINE) tablet 5 mg, 5 mg, Per G Tube, TID DUSTIN, Sandie West APRN, 5 mg at 02/01/21 1022  •  norepinephrine (LEVOPHED) 8 mg in 250 mL NS infusion (premix), 0.02-0.3 mcg/kg/min, Intravenous, Titrated, Anais May, APRN, Stopped at 01/30/21 0656  •  nystatin (MYCOSTATIN) powder, , Topical, Q12H, Eliud Dumont MD, 1 application at 02/01/21 0825  •  ondansetron (ZOFRAN) tablet 4 mg, 4 mg, Per G Tube, Q6H PRN **OR** ondansetron (ZOFRAN) injection 4 mg, 4 mg, Intravenous, Q6H PRN, Inez Alonzo, PharmD  •  pantoprazole (PROTONIX) injection 40 mg, 40 mg, Intravenous, Q12H, Tim,  Eliud MEDINA MD, 40 mg at 02/01/21 0825  •  pneumococcal polysaccharide 23-valent (PNEUMOVAX-23) vaccine 0.5 mL, 0.5 mL, Intramuscular, During Hospitalization, Eliud Dumont MD  •  potassium chloride (KLOR-CON) packet 40 mEq, 40 mEq, Per PEG Tube, PRN, Inez Alonzo, PharmD  •  potassium phosphate 45 mmol in sodium chloride 0.9 % 250 mL infusion, 45 mmol, Intravenous, PRN **OR** potassium phosphate 30 mmol in sodium chloride 0.9 % 100 mL infusion, 30 mmol, Intravenous, PRN **OR** potassium phosphate 15 mmol in sodium chloride 0.9 % 100 mL infusion, 15 mmol, Intravenous, PRN **OR** sodium phosphates 45 mmol in sodium chloride 0.9 % 250 mL IVPB, 45 mmol, Intravenous, PRN **OR** sodium phosphates 30 mmol in sodium chloride 0.9 % 100 mL IVPB, 30 mmol, Intravenous, PRN, Last Rate: 25 mL/hr at 01/13/21 1254, 30 mmol at 01/13/21 1254 **OR** sodium phosphates 15 mmol in sodium chloride 0.9 % 100 mL IVPB, 15 mmol, Intravenous, PRN, Eliud Dumont MD, Last Rate: 50 mL/hr at 01/26/21 0916, 15 mmol at 01/26/21 0916  •  rOPINIRole (REQUIP) tablet 4 mg, 4 mg, Per G Tube, Nightly, Andrea Burgos MD, 4 mg at 01/31/21 2114  •  sodium chloride 0.9 % flush 10 mL, 10 mL, Intravenous, PRN, Eliud Dumont MD, 10 mL at 02/01/21 0826  •  valACYclovir (VALTREX) tablet 1,000 mg, 1,000 mg, Per G Tube, Q12H, Harry Tran MD, 1,000 mg at 02/01/21 0825    Antibiotics:  Anti-Infectives (From admission, onward)    Ordered     Dose/Rate Route Frequency Start Stop    01/28/21 0925  valACYclovir (VALTREX) tablet 1,000 mg     Ordering Provider: Harry Tran MD    1,000 mg Per G Tube Every 12 Hours Scheduled 01/28/21 1015 02/03/21 0859    01/27/21 1439  valACYclovir (VALTREX) tablet 1,000 mg     Ordering Provider: Harry Tran MD    1,000 mg Per G Tube Every 12 Hours Scheduled 01/27/21 1441 01/27/21 2042    01/23/21 1916  cefTAZidime (FORTAZ) 2 g/100 mL 0.9% NS IVPB (mpb)     Lee Moreno, PharmD let the  order  on 21 1427.   Ordering Provider: Harry Tran MD    2 g  over 30 Minutes Intravenous Every 8 Hours 21 2015 21 2359    21 1022  micafungin 100 mg/100 mL 0.9% NS IVPB (mbp)     Lee Moreno, PharmD let the order  on 21 1427.   Ordering Provider: Harry Tran MD    100 mg  over 60 Minutes Intravenous Every 24 Hours 21 1115 21 2359    21 1457  cefepime (MAXIPIME) 2 g/100 mL 0.9% NS (mbp)     Ordering Provider: Harry Tran MD    2 g  200 mL/hr over 30 Minutes Intravenous Once 21 1545 21 1630    21 1345  levoFLOXacin (LEVAQUIN) 500 mg/100 mL D5W (premix) (LEVAQUIN) 500 mg     Cornelia Tompkins, PharmD reviewed the order on 01/15/21 0713.   Ordering Provider: Harry Tran MD    500 mg Intravenous Every 24 Hours 21 1500 21 1412    21 0917  piperacillin-tazobactam (ZOSYN) 3.375 g in iso-osmotic dextrose 50 ml (premix)     Ordering Provider: Adelso Bach MD    3.375 g  over 30 Minutes Intravenous Once 21 1015 21 1052    21 1402  remdesivir 100 mg in sodium chloride 0.9 % 250 mL IVPB (powder vial)     Cornelia Tompkins, PharmD reviewed the order on 01/15/21 1335.   Ordering Provider: Larry Hawkins PA    100 mg  over 60 Minutes Intravenous Every 24 Hours 01/10/21 1200 21 1229    21 1801  remdesivir 100 mg in sodium chloride 0.9 % 250 mL IVPB (powder vial)     Ordering Provider: Mena Ribeiro APRN    100 mg  over 60 Minutes Intravenous Every 24 Hours 21 1800 21 1912    21 1801  remdesivir 200 mg in sodium chloride 0.9 % 250 mL IVPB (powder vial)     Ordering Provider: Mena Ribeiro APRN    200 mg  over 60 Minutes Intravenous Every 24 Hours 21 7711            Review of Systems:  See HPI      Physical Exam:   Vital Signs  Temp (24hrs), Av.3 °F (37.4 °C), Min:99 °F (37.2 °C), Max:100.4 °F (38 °C)    Temp  Min: 99  °F (37.2 °C)  Max: 100.4 °F (38 °C)  BP  Min: 77/38  Max: 140/50  Pulse  Min: 85  Max: 110  Resp  Min: 29  Max: 38  SpO2  Min: 89 %  Max: 99 %    GENERAL: Resting quietly on vent  fio2 50%   HEENT: Normocephalic, atraumatic.  Lower lip lesions present, hcnat  Tracheostomy present  HEART: Normal rate by monitor S1-S2  LUNGS: symmetrical inspiration; on vent; clear to auscultation  ABDOMEN: nondistended.  Soft  Musculoskeletal: No joint deformity    SKIN: No rashes on exposed skin    Laboratory Data    Results from last 7 days   Lab Units 02/01/21  0737 01/31/21  0316 01/30/21  0353   WBC 10*3/mm3 7.51 8.21 8.19   HEMOGLOBIN g/dL 7.7* 7.9* 8.4*   HEMATOCRIT % 24.9* 26.5* 30.1*   PLATELETS 10*3/mm3 299 295 234     Results from last 7 days   Lab Units 02/01/21  0332   SODIUM mmol/L 137   POTASSIUM mmol/L 4.5   CHLORIDE mmol/L 104   CO2 mmol/L 22.0   BUN mg/dL 14   CREATININE mg/dL 0.54*   GLUCOSE mg/dL 141*   CALCIUM mg/dL 8.5*     Results from last 7 days   Lab Units 02/01/21  0332   ALK PHOS U/L 55   BILIRUBIN mg/dL 0.3   ALT (SGPT) U/L 18   AST (SGOT) U/L 21         Results from last 7 days   Lab Units 02/01/21  0332   CRP mg/dL 37.55*         Results from last 7 days   Lab Units 02/01/21  0332 01/31/21  0316 01/30/21  0353   CK TOTAL U/L 24 26 36         Estimated Creatinine Clearance: 74.7 mL/min (A) (by C-G formula based on SCr of 0.54 mg/dL (L)).      Microbiology:  Microbiology Results (last 10 days)     Procedure Component Value - Date/Time    Herpes Simplex Virus Culture - Swab, Oropharynx [[id number]] Collected: 01/27/21 0004    Lab Status: Final result Specimen: Swab from Oropharynx Updated: 01/29/21 1908     HSV Culture Without Typing Negative    Narrative:      Performed at:  [address] [place] [address]  [id number]  : Steve Cooper PhD, Phone:  [phone]                Radiology:  Imaging Results (Last 72 Hours)     Procedure Component Value Units Date/Time    XR Chest  1 View [[id number]] Resulted: 02/01/21 1204     Updated: 02/01/21 1204    XR Chest 1 View [[id number]] Collected: 02/01/21 0921     Updated: 02/01/21 0923    Narrative:      EXAMINATION: XR CHEST 1 VW- 02/01/2021     INDICATION: J96.01-Acute respiratory failure with hypoxia;  U07.1-COVID-19; J12.82-Pneumonia due to Coronavirus disease 2019;  J93.9-Pneumothorax, unspecified; J93.9-Pneumothorax, unspecified      COMPARISON: 01/31/2021     FINDINGS: Patchy airspace disease seen diffusely throughout the lung  fields bilaterally. Bilateral chest tubes in place. There is a  tracheostomy tube in satisfactory position above the frankie.  Degenerative changes seen within the spine. Small bilateral pleural  effusions.          Impression:      Patchy airspace disease seen stable throughout the lung  fields bilaterally. Bilateral chest tubes in place with no pneumothorax.  Tracheostomy tube in satisfactory position.     D:  02/01/2021  E:  02/01/2021       XR Chest 1 View [[id number]] Collected: 01/31/21 0745     Updated: 01/31/21 0750    Narrative:      EXAMINATION: XR CHEST 1 VW-      INDICATION: resp failure; J96.01-Acute respiratory failure with hypoxia;  U07.1-COVID-19; J12.82-Pneumonia due to Coronavirus disease 2019;  J93.9-Pneumothorax, unspecified; J93.9-Pneumothorax, unspecified      COMPARISON: 1/30/2021     FINDINGS: Screw plate fixation of the right clavicle. Stable support  hardware. Cardiac silhouette is obscured. Low lung volumes with  extensive bilateral airspace opacities and prominent interstitial  markings. Small left pleural effusion. No pneumothorax.       Impression:      No interval change.     This report was finalized on 1/31/2021 7:47 AM by Noble Rojas.       XR Chest 1 View [[id number]] Collected: 01/30/21 0913     Updated: 01/30/21 0918    Narrative:      EXAMINATION: XR CHEST 1 VW-      INDICATION: resp failure; J96.01-Acute respiratory failure with hypoxia;  U07.1-COVID-19; J12.82-Pneumonia due to  Coronavirus disease 2019;  J93.9-Pneumothorax, unspecified; J93.9-Pneumothorax, unspecified      COMPARISON: 1/29/2021     FINDINGS: Screw plate fixation of the right clavicle. Right PICC.  Tracheostomy tube is in place. Left-sided pleural catheter is in place.  Right-sided pleural catheter is in place. Cardiac silhouette is somewhat  obscured. Diffuse bilateral airspace opacities appear increased,  particularly in the right lung, and again are most prominent at the left  lung base. Small left pleural effusion. No pneumothorax.        Impression:      1. Support hardware is stable.     2. Slightly worsening lung aeration, probably within the right lung.     This report was finalized on 1/30/2021 9:14 AM by Noble Rojas.       MRI Brain Without Contrast [[id number]] Collected: 01/30/21 0855     Updated: 01/30/21 0902    Narrative:      EXAMINATION: MRI BRAIN WO CONTRAST-      INDICATION: Mental status change, unknown cause; J96.01-Acute  respiratory failure with hypoxia; U07.1-COVID-19; J12.82-Pneumonia due  to Coronavirus disease 2019; J93.9-Pneumothorax, unspecified;  J93.9-Pneumothorax, unspecified     TECHNIQUE: Multiplanar MR imaging of the brain was performed without  administration of intravenous contrast.     COMPARISON: CT head dated 1/19/2021     FINDINGS: No midline shift. No restricted diffusion. No findings to  suggest acute hemorrhage. No extra-axial fluid collection. No midline  shift. Ventricles are normal in size. Gray-white differentiation is  preserved. Globes are normal. Mucosal thickening in the frontal sinuses  and ethmoid air cells. Extensive bilateral mastoid air cell effusions.  Vascular flow voids are maintained. No suspicious FLAIR  hyperintensities.        Impression:      1. No acute intracranial findings. Bilateral mastoid air cell effusions.     This report was finalized on 1/30/2021 8:58 AM by Noble Rojas.       XR Chest 1 View [[id number]] Collected: 01/29/21 0815     Updated: 01/29/21  2206    Narrative:      EXAMINATION: XR CHEST 1 VW- 01/29/2021     INDICATION: J96.01-Acute respiratory failure with hypoxia;  U07.1-COVID-19; J12.82-Pneumonia due to Coronavirus disease 2019;  J93.9-Pneumothorax, unspecified; J93.9-Pneumothorax, unspecified     COMPARISON: 01/28/2021     FINDINGS: PICC line remains in the SVC. There are bilateral pleural  drains in place. Heart shadow is partly obscured but probably only upper  limits of normal size. There is coarse bilateral pulmonary interstitial  disease stable in pattern from yesterday's study. No pneumothorax or  other new chest disease is seen.       Impression:      Stable pulmonary interstitial disease.        D:  01/29/2021  E:  01/29/2021     This report was finalized on 1/29/2021 10:03 PM by Dr. Azam Villaseñor MD.           Estimated Creatinine Clearance: 74.7 mL/min (A) (by C-G formula based on SCr of 0.54 mg/dL (L)).    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

## 2021-02-01 NOTE — PLAN OF CARE
Goal Outcome Evaluation:        Outcome Summary: remains febrile and tylenol just given at end of previous shift-tachypneic even more than vent settings-seems slightly more restless at times this am-still no eye contact -does not follow commands-does not spont move anything but head-minimal trach secretions obtained today-skin surrounding trach remains red-left chest pigtail drain leaking at insertion sitepinkish fluid in small amounts early in shift but began to leak much more and redressed at 1500 then clot stripped down tubing at 1845 and immediate drainage of 74ml orange fluid-adequate urine output-temp starting to rise again this afternoon

## 2021-02-01 NOTE — PROGRESS NOTES
Continued Stay Note  [facility]     Patient Name: Mei Gunter  MRN: [id number]  Today's Date: 2/1/2021    Admit Date: 1/8/2021    Discharge Plan     Row Name 02/01/21 1203       Plan    Plan  LTACH    Plan Comments  Patient sedated on vent. Discussed in MDR.  Patient may have one chest tube dc'd today.  [facility] and [facility] are following patient.  CM will continue to follow.        Discharge Codes    No documentation.       Expected Discharge Date and Time     Expected Discharge Date Expected Discharge Time    Feb 8, 2021             Kay Gutierrez RN

## 2021-02-01 NOTE — PROGRESS NOTES
Clinical Nutrition     Nutrition Support   Reason for Visit:   MDR, Follow-up protocol, EN    Patient Name: Mei Gunter  YOB: 1945  MRN: [id number]  Date of Encounter: 02/01/21 11:14 EST  Admission date: 1/8/2021    Nutrition Assessment   Assessment     Applicable diagnosis, conditions, procedures:  Covid 19 PNA  Acute respiratory failure   Intubated (1/9)  Disconjugate gaze (1/19)  Fever   Leukocytosis  Volume overload with significant third spaced edema  L pneumothorax, s/p chest tube placement (1/20)  R pneumothorax, s/p chest tube placement (1/25)  S/p tracheostomy and PEG placements (1/27)  Encephalopathy  Fevers    Past Medical History   Asthma  COPD  T2DM   HTN  RLS  Hernia Repair     Reported/Observed/Food/Nutrition Related History:     Noted that EN was held on (1/30) for high residuals. EN currently running at 25 ml/hr this AM. OK per intensivist to increase EN slowly back to goal rate as pt tolerates.     Per I&O- last bowel movement documented (1/26-1/27).       Labs reviewed       Results from last 7 days   Lab Units 02/01/21  0332 01/31/21  0316 01/30/21  0353   SODIUM mmol/L 137 137 138   POTASSIUM mmol/L 4.5 4.5 4.5   CHLORIDE mmol/L 104 106 106   CO2 mmol/L 22.0 23.0 20.0*   BUN mg/dL 14 15 17   CREATININE mg/dL 0.54* 0.58 0.58   GLUCOSE mg/dL 141* 97 105*   CALCIUM mg/dL 8.5* 8.2* 8.4*   PHOSPHORUS mg/dL 3.2 2.6 2.9     Results from last 7 days   Lab Units 02/01/21  0503 01/31/21  2311 01/31/21  1709 01/31/21  1211 01/31/21  0508 01/30/21  2306   GLUCOSE mg/dL 184* 117 161* 136* 108 137*     Lab Results   Lab Value Date/Time    HGBA1C 6.80 (H) 01/08/2021 1515       Medications reviewed   Pertinent: insulin, protonix, requip, valtrex  GTT: dilaudid  PRN: tylenol, xanax, norco, ativan    relistor given on (1/14 and 1/16)   pericolace given from (1/9-1/16)  1 dose of lokelma given (1/15)    Needs Assessment (2/1)   Height used: 66 in/168 cm  Weight used: 228 lb/104  kg -- ? closer to dry weight  IBW: 130 lb/59 kg    Estimate calorie needs: ~2000 kcal/day  PSU (Ve= 12.2, Tmax= 38)= 2026 kcal  20 kcal/kg actual weight= 2080 kcal    Estimated protein needs: ~118 g pro/day  2.0 g/kg IBW= 118 g pro  1.0-1.2 g/kg actual weight= 104-124 g pro      Current Nutrition Prescription   PO: NPO Diet     EN: Peptamen AF at 80 ml/hr (goal volume= 1600 ml/day) and free water at 50 ml/hr  Route: PEG  Verified at the bedside: No -- per current [facility] policy  Provides at goal volume: 1920 kcal, 121 g pro, 9 g fiber, 1298 ml water from EN, 2298 ml water total      Evaluation of delivered nutrients/fluids-EN:  3 Days:  665 ml, 42% -- has been on hold and/or running at trophic rate since (1/30)      Nutrition Diagnosis   Date: 1-9-21  Updated: 1-13, 1/13  Problem Inadequate energy intake   Etiology Per Clinical Status: ARF/VENT   Signs/Symptoms NPO    Status: improved, EN started on 1-11    Nutrition Intervention    Follow treatment progress, Care plan reviewed   -Will continue with current EN order at this time  -Will adjust EN order in Epic to increase slowly to goal rate as pt tolerates    -Will continue to follow and adjust nutrition support regimen as medically appropriate       Goal:   General: Nutrition support treatment  EN/PN: Tolerate EN at goal    Monitoring/Evaluation:   Per protocol, I&O, Pertinent labs, EN delivery/tolerance, Weight, GI status, Symptoms    Sherry Bennett, MS RD/LD CNSC  Time Spent: 45 minutes

## 2021-02-02 NOTE — PLAN OF CARE
Problem: Fall Injury Risk  Goal: Absence of Fall and Fall-Related Injury  Intervention: Identify and Manage Contributors to Fall Injury Risk  Recent Flowsheet Documentation  Taken 2/1/2021 0800 by Tiara Patel RN  Self-Care Promotion: independence encouraged  Intervention: Promote Injury-Free Environment  Recent Flowsheet Documentation  Taken 2/1/2021 1800 by Tiara Patel RN  Safety Promotion/Fall Prevention: safety round/check completed  Taken 2/1/2021 1600 by Tiara Patel RN  Safety Promotion/Fall Prevention:   activity supervised   assistive device/personal items within reach  Taken 2/1/2021 1400 by Tiara Patel RN  Safety Promotion/Fall Prevention: safety round/check completed  Taken 2/1/2021 1200 by Tiara Patel RN  Safety Promotion/Fall Prevention:   activity supervised   safety round/check completed  Taken 2/1/2021 1000 by Tiara Patel RN  Safety Promotion/Fall Prevention: safety round/check completed  Taken 2/1/2021 0800 by Tiara Patel RN  Safety Promotion/Fall Prevention:   activity supervised   safety round/check completed   Goal Outcome Evaluation:        Outcome Summary: pt remains restless and tachypneic. Norco, xanax and ativan given with little results. Right chest tube removed but oozing serous fluid from the site. Dressing reinforced with abd pads . Left ct had 110 output. b/p is stable with midorine. Hr remains 90's to 110's. T-max was 100.8 axillary. Dilaudid increased to 1.4mg/hr. Will continue to monitor respiratory status with hopes of improvement in drainage amount . Temp control remains problematic . will condtinue to monitor.

## 2021-02-02 NOTE — PROGRESS NOTES
INTENSIVIST   PROGRESS NOTE     Hospital:  LOS: 25 days     Ms. Mei Gunter, 76 y.o. female is followed for a Chief Complaint of: Respiratory Failure      Subjective   S     Interval History:  Febrile this AM to 101. Restless.        The patient's relevant past medical, surgical and social history were reviewed and updated in Epic as appropriate.      ROS: Unable to obtain secondary to mental status and mechanical ventilation.     Objective   O     Vitals:  Temp  Min: 98.6 °F (37 °C)  Max: 101.4 °F (38.6 °C)  BP  Min: 95/44  Max: 164/71  Pulse  Min: 75  Max: 116  Resp  Min: 26  Max: 42  SpO2  Min: 85 %  Max: 99 % No data recorded    Intake/Ouptut 24 hrs (7:00AM - 6:59 AM)  Intake & Output (last 3 days)       01/30 0701 - 01/31 0700 01/31 0701 - 02/01 0700 02/01 0701 - 02/02 0700 02/02 0701 - 02/03 0700    I.V. (mL/kg) 186.4 (1.7) 317 (2.8) 261.5 (2.3) 44.6 (0.4)    Other 245 350 785 321    NG/ 498 881 215    IV Piggyback 399 400 350     Total Intake(mL/kg) 1273.4 (11.4) 1565 (14) 2277.5 (20.3) 580.6 (5.2)    Urine (mL/kg/hr) 1250 (0.5) 780 (0.3) 700 (0.3) 300 (0.7)    Emesis/NG output 30       Chest Tube 28 194 180 30    Total Output 1308 974 880 330    Net -34.6 +591 +1397.5 +250.6            Urine Unmeasured Occurrence   1 x 1 x          Medications (drips):  HYDROmorphone 1 mg/ml, Last Rate: 1.6 mg/hr (02/02/21 0905)  norepinephrine, Last Rate: Stopped (01/30/21 0656)        Mechanical Ventilator Settings:          Resp Rate (Set): 26     FiO2 (%): 45 %  PEEP/CPAP (cm H2O): 5 cm H20    Minute Ventilation (L/min) (Obs): 14.4 L/min  Resp Rate (Observed) Vent: 33  I:E Ratio (Set): 1:0.58  I:E Ratio (Obs): 2.30:1    PIP Observed (cm H2O): 37 cm H2O  Plateau Pressure (cm H2O): 0 cm H2O    Physical Examination  Telemetry:  Normal sinus rhythm.    Constitutional:  No acute distress.  Trach in place on mechanical ventilation.    Eyes: No scleral icterus.   PERRL, EOM intact.    Neck:  Supple, FROM    Cardiovascular: Normal rate, regular and rhythm. Normal heart sounds.  No murmurs, gallop or rub.   Respiratory: No respiratory distress. Normal respiratory effort.  Diminished bilaterally. No wheezing.    Left chest tube in place with serosanguinous drainage.    Abdominal:  Soft. No masses. Nontender. No distension. No HSM.   Extremities: No digital cyanosis. No clubbing.  No peripheral edema.   Skin: No rashes, lesions or ulcers   Neurological:   Moves eyes and extremities.   Does not follow commands.             Results from last 7 days   Lab Units 02/02/21 0440 02/01/21  0737 01/31/21  0316   WBC 10*3/mm3 7.94 7.51 8.21   HEMOGLOBIN g/dL 7.3* 7.7* 7.9*   MCV fL 93.4 91.5 96.0   PLATELETS 10*3/mm3 307 299 295     Results from last 7 days   Lab Units 02/02/21 0440 02/01/21  0332 01/31/21  0316   SODIUM mmol/L 135* 137  137 137   POTASSIUM mmol/L 4.4 4.6  4.5 4.5   CO2 mmol/L 23.0 18.0*  22.0 23.0   CREATININE mg/dL 0.58 0.58  0.54* 0.58   GLUCOSE mg/dL 124* 135*  141* 97   MAGNESIUM mg/dL 2.0 2.4  2.1 2.4   PHOSPHORUS mg/dL 2.9 3.2  3.2 2.6     Estimated Creatinine Clearance: 74.7 mL/min (by C-G formula based on SCr of 0.58 mg/dL).  Results from last 7 days   Lab Units 02/02/21 0440 02/01/21  0332 01/31/21  0316   ALK PHOS U/L 62 54  55 44   BILIRUBIN mg/dL 0.3 0.2  0.3 0.3   ALT (SGPT) U/L 12 17  18 19   AST (SGOT) U/L 16 21  21 20             Images:  Imaging Results (Last 24 Hours)     Procedure Component Value Units Date/Time    XR Chest 1 View [[id number]] Collected: 02/02/21 0842     Updated: 02/02/21 0856    Narrative:      EXAMINATION: XR CHEST 1 VW-      INDICATION: J96.01-Acute respiratory failure with hypoxia;  U07.1-COVID-19; J12.82-Pneumonia due to Coronavirus disease 2019;  J93.9-Pneumothorax, unspecified.      COMPARISON: Chest x-ray 02/01/2021.     FINDINGS: Interval removal of right chest tube with a left chest tube  remain in place. No discrete pneumothorax with bilateral dense  pulmonary  opacifications stable to slightly increased from prior comparison along  with probable trace right and small left pleural effusion.           Impression:      Interval removal of right chest tube with a left chest tube  remain in place. No discrete pneumothorax with bilateral dense pulmonary  opacifications stable to slightly increased from prior comparison along  with probable trace right and small left pleural effusion.     D:  02/02/2021  E:  02/02/2021          XR Chest 1 View [[id number]] Collected: 02/01/21 1249     Updated: 02/01/21 1642    Narrative:      EXAMINATION: XR CHEST 1 VW-02/01/2021:      INDICATION: Evaluate R lung; chest tube clamped; J96.01-Acute  respiratory failure with hypoxia; U07.1-COVID-19; J12.82-Pneumonia due  to Coronavirus disease 2019; J93.9-Pneumothorax, unspecified;  J93.9-Pneumothorax, unspecified.      COMPARISON: NONE.     FINDINGS: Portable chest reveals bilateral chest tubes in place. No  definite pneumothorax identified on the right. Increased markings seen  diffusely throughout the lung fields bilaterally. PICC line catheter is  stable. Tracheostomy tube in satisfactory position.       Impression:      No definite pneumothorax identified on the right. Chest is  stable.     D:  02/01/2021  E:  02/01/2021     This report was finalized on 2/1/2021 4:39 PM by Dr. Vita De Jesus MD.       XR Chest 1 View [[id number]] Collected: 02/01/21 0921     Updated: 02/01/21 1641    Narrative:      EXAMINATION: XR CHEST 1 VW- 02/01/2021     INDICATION: J96.01-Acute respiratory failure with hypoxia;  U07.1-COVID-19; J12.82-Pneumonia due to Coronavirus disease 2019;  J93.9-Pneumothorax, unspecified; J93.9-Pneumothorax, unspecified      COMPARISON: 01/31/2021     FINDINGS: Patchy airspace disease seen diffusely throughout the lung  fields bilaterally. Bilateral chest tubes in place. There is a  tracheostomy tube in satisfactory position above the frankie.  Degenerative changes seen  within the spine. Small bilateral pleural  effusions.          Impression:      Patchy airspace disease seen stable throughout the lung  fields bilaterally. Bilateral chest tubes in place with no pneumothorax.  Tracheostomy tube in satisfactory position.     D:  02/01/2021  E:  02/01/2021     This report was finalized on 2/1/2021 4:38 PM by Dr. Vita De Jesus MD.               Results: Reviewed.  I reviewed the patient's new laboratory and imaging results.  I independently reviewed the patient's new images.    Medications: Reviewed.    Assessment/Plan   A / P     Ms. Gunter is a 75yo F with a history of COPD, DM, hypothyroidism and hypertension who presented to [facility] on 1/8/21 with shortness of air and weakness after testing positive for COVID-19 on 1/4/21. She was exposed to COVID through her son. She was placed on Remdesivir, Decadron and Lovenox and given empiric antibiotics. She was transferred to the ICU on 1/10/21 for worsening hypoxia and was intubated. On 1/18/21, sedation was weaned but she then developed disconjugate gaze. She underwent imaging of the head which was negative for stroke and bleeding. She was started on Precedex but this was discontinued when she developed severe hypotension. On 1/20/21, she developed a fever. C. Diff was negative. Sputum culture showed Klebsiella and Stenotrophomonas. She was then started on Cefepime and Vancomycin by ID and Micafungin for yeast dermatitis. A left sided chest tube was placed on 1/20/21 for a left pneumothorax. She developed a spontaneous pneumothorax on the right on 1/25/21 and a chest tube was placed emergently. She underwent a trach/PEG on 1/27/21. Neurology was consulted on 1/29/21 for persistent encephalopathy. EEG was performed and showed generalized slowing but no seizure activity.     Despite antibiotic therapy, she has continued to have a fever. Seroquel has been discontinued as of 1/31 in case of drug fever. She has remained on a Dilaudid drip.  The right chest tube was removed on 2/1/21.       Nutrition:   NPO Diet  Advance Directives:   Code Status and Medical Interventions:   Ordered at: 01/08/21 4631     Level Of Support Discussed With:    Patient     Code Status:    CPR     Medical Interventions (Level of Support Prior to Arrest):    Full       Active Hospital Problems    Diagnosis   • **Acute respiratory failure with hypoxia, status post tracheostomy on 1/27/2021   • Metabolic encephalopathy, likely multifactorial   • Pneumothorax, bilateral   • COVID-19 virus detected   • Disease of thyroid gland   • Diabetes mellitus (CMS/HCC)   • Hypertension       Assessment / Plan:    Continue mechanical ventilation. PST as tolerated.   Monitor off antibiotics.     Continue left chest tube due to increased output.   Start Zyprexa.   Increase tube feeds to goal as tolerated.   Neurology has signed off.   Decrease insulin.   Continue low-dose midodrine.   AM labs and CXR    High level of risk due to:  severe exacerbation of chronic illness and illness with threat to life or bodily function.    Plan of care and goals reviewed during interdisciplinary rounds.  I discussed the patient's findings and my recommendations with nursing staff        Aubrie Vincent,     Intensive Care Medicine and Pulmonary Medicine

## 2021-02-02 NOTE — PROGRESS NOTES
Multidisciplinary Rounds    Time: 20min  Patient Name: Mei Gunter  Date of Encounter: 02/02/21 09:25 EST  MRN: [id number]  Admission date: 1/8/2021      Reason for visit: MDR. RD to continue to follow per protocol.     Additional information obtained during MDR: Pt tolerating EN, slowly increasing to goal rate, with 45% of EN goal volume delivered over the past 24 hrs. Per I&O- last bowel movement documented (1/26-1/27).     Current diet: NPO Diet    EN: Peptamen AF at 80 ml/hr and free water at 50 ml/hr via PEG    Intervention:  Follow treatment plan  Care plan reviewed    Follow up:   Per protocol      Sherry Bennett MS RD/[facility]  09:25 EST

## 2021-02-02 NOTE — PLAN OF CARE
Goal Outcome Evaluation:  Plan of Care Reviewed With: patient  Progress: no change  Outcome Summary: Continues on mechanical ventilator. Restless/agitation at beginning of shift, PRN Ativan, and Norco given with little improvement. Dilaudid PCA increased to 1.6. Zyprexa initiated. Max temperature 101.4. Tylenol given x1. 40ml out of chest tube. Will continue to monitor.

## 2021-02-02 NOTE — PLAN OF CARE
Goal Outcome Evaluation:  Plan of Care Reviewed With: patient  Progress: no change  Outcome Summary: Pt continues to be restless and tachyneic especially with any type of noise or stimulation. Norco, ativan, xanax PRN given with minimal effect on agitation/restlessness. R chest tube removed yesterday; CT site draining copious amounts of secretions at beginning of shift. Dressing changed and albumin given, no drainage noted after. VSS. Tmax 101.4, tylenol given x1.

## 2021-02-03 NOTE — PROGRESS NOTES
Clinical Nutrition     Nutrition Support   Reason for Visit:   MDR, Follow-up protocol, EN    Patient Name: Mei Gunter  YOB: 1945  MRN: [id number]  Date of Encounter: 02/03/21 12:18 EST  Admission date: 1/8/2021    Nutrition Assessment   Assessment     Applicable diagnosis, conditions, procedures:  Covid 19 PNA  Acute respiratory failure   Intubated (1/9)  Disconjugate gaze (1/19)  Fever   Leukocytosis  Volume overload with significant third spaced edema  L pneumothorax, s/p chest tube placement (1/20)  R pneumothorax, s/p chest tube placement (1/25)  S/p tracheostomy and PEG placements (1/27)  Encephalopathy  Fevers    Past Medical History   Asthma  COPD  T2DM   HTN  RLS  Hernia Repair     Reported/Observed/Food/Nutrition Related History:     RN reports pt had residuals of 500 ml last night, therefore EN was held for most of the night. OK per intensivist to restart EN at half rate until pt has a bowel movement.       Per I&O- last bowel movement documented (1/26-1/27) -- planning to add bowel regimen today (2/3)      Labs reviewed       Results from last 7 days   Lab Units 02/03/21  0340 02/02/21  2109 02/02/21  0440 02/01/21  0332   SODIUM mmol/L 138 135* 135* 137  137   POTASSIUM mmol/L 4.7 4.7 4.4 4.6  4.5   CHLORIDE mmol/L 104 101 102 104  104   CO2 mmol/L 24.0 23.0 23.0 18.0*  22.0   BUN mg/dL 19 17 15 14  14   CREATININE mg/dL 0.59 0.60 0.58 0.58  0.54*   GLUCOSE mg/dL 122* 104* 124* 135*  141*   CALCIUM mg/dL 8.7 8.9 8.6 8.3*  8.5*   PHOSPHORUS mg/dL 2.2*  --  2.9 3.2  3.2     Results from last 7 days   Lab Units 02/03/21  1145 02/03/21  0509 02/02/21  2314 02/02/21  2125 02/02/21  1757 02/02/21  1125   GLUCOSE mg/dL 123 135* 123 103 171* 139*     Lab Results   Lab Value Date/Time    HGBA1C 6.80 (H) 01/08/2021 1515       Medications reviewed   Pertinent: antibiotic, senokot, colace, lasix, insulin, oxycodone, protonix, miralax, seroquel, requip  PRN: tylenol,  xanax, norco, ativan    relistor given on (1/14 and 1/16)   pericolace given from (1/9-1/16)  1 dose of lokelma given (1/15)    Needs Assessment (2/1)   Height used: 66 in/168 cm  Weight used: 228 lb/104 kg -- ? closer to dry weight  IBW: 130 lb/59 kg    Estimate calorie needs: ~2000 kcal/day  PSU (Ve= 12.2, Tmax= 38)= 2026 kcal  20 kcal/kg actual weight= 2080 kcal    Estimated protein needs: ~118 g pro/day  2.0 g/kg IBW= 118 g pro  1.0-1.2 g/kg actual weight= 104-124 g pro      Current Nutrition Prescription   PO: NPO Diet     EN: Peptamen AF at 80 ml/hr (goal volume= 1600 ml/day) and free water at 50 ml/hr  Route: PEG  Verified at the bedside: No -- per current [facility] policy  Provides at goal volume: 1920 kcal, 121 g pro, 9 g fiber, 1298 ml water from EN, 2298 ml water total      Evaluation of delivered nutrients/fluids-EN:  1 Day:  811 ml, 51%      Nutrition Diagnosis   Date: 1-9-21  Updated: 1-13, 1/13  Problem Inadequate energy intake   Etiology Per Clinical Status: ARF/VENT   Signs/Symptoms NPO    Status: improved, EN started on 1-11    Nutrition Intervention    Follow treatment progress, Care plan reviewed   -Will continue with current EN order at this time  -Planning to run EN at half rate at this time until pt has a bowel movement    -Will continue to follow and adjust nutrition support regimen as medically appropriate       Goal:   General: Nutrition support treatment  EN/PN: Tolerate EN at goal    Monitoring/Evaluation:   Per protocol, I&O, Pertinent labs, EN delivery/tolerance, Weight, GI status, Symptoms    Sherry Bennett, MS RD/LD CNSC  Time Spent: 45 minutes

## 2021-02-03 NOTE — PROGRESS NOTES
"Patient Name:  Mei Gunter  YOB: 1945  [id number]    Surgery Progress Note    Date of visit: 2/3/2021      Subjective: Was asked by staff to see Mrs. Gunter this morning will rounding in the ICU due to concern for drainage around the tracheostomy site. She has not had difficulty with ventilation or oxygenation, but has continued to have fevers. She is without leukocytosis. She has had some wound breakdown and secretions surrounding the trach site.           Objective:     BP 97/44   Pulse 82   Temp 99.8 °F (37.7 °C) (Axillary)   Resp 28   Ht 167.6 cm (66\")   Wt 112 kg (247 lb)   SpO2 93%   BMI 39.87 kg/m²     Intake/Output Summary (Last 24 hours) at 2/3/2021 1531  Last data filed at 2/3/2021 1300  Gross per 24 hour   Intake 1851.3 ml   Output 1890 ml   Net -38.7 ml       GEN:   Critically ill, resting in bed, no distress   Neck:  The tracheostomy is in appropriate position, with the padding removed and tracheostomy site carefully examined there is some evidence of ulceration around the wound site as well as copious thick tan respiratory secretions within the wound bed, with the secretions suctioned free there is no gross necrosis   CV:   Regular rate and rhythm  L:  Clear to auscultation bilaterally, not labored on room air   Abd:  Soft, not tender, not distended  Ext:  No cyanosis, clubbing, or edema    Recent labs that are back at this time have been reviewed.           Assessment/ Plan:    Mrs. Gunter is a 76-year-old lady with history of COPD, diabetes, hypothyroidism, and hypertension with complex hospital course related to COVID-19 pneumonia with ongoing respiratory failure and feeding difficulty who I have been asked to see for PEG placement and tracheostomy creation.   Trach and PEG were placed 1/27        #Respiratory failure  -s/p tracheostomy on 1/27  -I was asked to see her today due to concern regarding the tracheostomy site. On exam, there is some ulceration of the wound site " surrounding the tracheostomy appliance as well as quite a bit of thick tan mucoid secretions. With this suctioned free there does not appear to be any obvious evidence of wound necrosis.   -Wound recommend to continue antibiotics at this time and routine local wound and trach care. If there is concern for lack of improvement of the wound site or worsened clinical status, would consider CT neck and chest to evaluate for deep space infecton      Eliud Dumont MD  2/3/2021  15:31 EST

## 2021-02-03 NOTE — PROGRESS NOTES
INFECTIOUS DISEASE CONSULT/INITIAL HOSPITAL VISIT    Mei Gunter  1945  [id number]    Date of Consult: 2/2/2021    Admission Date: 1/8/2021      Requesting Provider: Dmitry Hernandez MD  Evaluating Physician: Harry Tran III, MD    Reason for Consultation: COVID-19 acute respiratory failure     History of present illness:    Patient is a 76 y.o. female with h/o COPD, T2DM, hypothyroidism, and HTN who presented to [facility] ED on 1/8/21 with increasing shortness of breath, weakness, fever, and dark black diarrhea.  The patient had shortness of breath and cough that started about 10 days ago with fever about 5 days ago of greater than 100F.  She tested positive for COVID-19 on or about 1/4/21 and caught COVID-19 from her son.  She was placed on Keflex by her PCP for possible bronchitis.  She also had nausea, vomiting, diarrhea and was waiting for Cdiff toxin test result.  She was having up to 5 to 10 stools a day and towards the latter part of the diarrhea, it had become sticky and slimy.  She denies dysuria or rashes.  Work up showed Tmax 100.8, worsening hypoxia now requiring 70% FiO2 45L O2 by high flow oxygen, A1C 6.8, lactic acid 1.2, D-dimer 1.8-->1.52, WBC 5700 with 86% neutrophils, Ferritin 334-->334, CRP 19.1-->31.75, -->520, proBNP 492, PCT 0.13, , and creatinine 1.02. A respiratory panel PCR is positive for COVID-19.  Blood cultures are pending. A CXR shows diffuse multifocal airspace disease.  She is currently on Remdesivir, Dexamethasone, and Lovenox.  ID was asked to evaluate and manage her antimicrobial therapy.     1/10/2021 patient in transfer the intensive care unit because of worsening hypoxia is currently intubated and sedated review of systems are unobtainable  1/11/21; patient is critically ill on pressor, vent, sedated fio2 45%; ros unobtainable  1/12/21; no events overnight; off of pressors, on vent; on tube feeds; no fever, ros unobtainable  1/13/21; on increasing fio2, sedation,  on vent, on pressors; started on zosyn.  On intermittent paralytics    1/14/21; patient is critically ill on vent peep 14, low dose pressors, sedated ros unobtainable    1/15/2021 patient is on low-dose Levophed likely for decreased vascular tone from sedation; patient is not requiring paralytics; FiO2 is down to 40%; patient is afebrile and hemodynamically stable with the exception of low-dose hypotension.  Review of systems are unobtainable    1/18/2021 patient on FiO2 of 40% propofol turned off patient remains on fentanyl Versed opens eyes to noxious stimulation afebrile normotensive  1/19/21; patient remains on vent, peep at 8, tolerating tube feeds; sedated ros unobtainable; afebrile, hemodynamically stable.  1/20/21; has fever, elevation of wbc, good response to bumex yesterday; on fio2 50%; started on levophed drip for hypotension. Remains critically ill. Had left sided pneumothorax treated with chest tube (pigtail drain)    1/21/2021; having low-grade temperatures remains intubated sedated on fentanyl drip given Ativan as needed for agitation restlessness; review of systems are unobtainable patient remains critically ill    1/22/21; no events overnight; afebrile, patient now on versed, fentanyl, remains intubated on fio2 40%; ros unobtainable    1/23/2021 patient is critically ill afebrile hemodynamically stable remains on FiO2 sedation is difficult patient is receiving as needed Ativan patient has been on Bumex drip review of systems are unobtainable    1/24/21; patient remains critically ill; on vent, on dilaudid drip for sedation  abx changed to ceftazidime last night;   Has low grade fevers on fio2 40%.  Ros unobtainable    1/25/2021 patient now has right-sided pneumothorax had chest tube placed today patient sedated on ventilator is hemodynamically stable has low-grade temperatures review of systems are unobtainable    1/26/21; no events overnight; remains critically ill remains on vent, sedation, ros  unobtainable    1/27/21; patient remains critically ill; on vent; returned from the OR for trach and peg today; has been diuresed today patient sedated on FiO2 40% no distress has been off of airborne precautions now    1/28/2021 patient remains on Dilaudid drip plan is to decrease this sedation patient is on low-dose of Levophed.  Patient is confused not following commands patient remains on vent receiving blood products today    1/29/21 patient is off of Dilaudid drip is resting quietly on ventilator patient opens eyes to voice but cannot follow commands    2/1/21; patient has fever now; is on fio2 50% resting quietly; on dilaudid drip.    2/2/21; continues to have fever, otherwise hemodynamically stable; seroquel held; on vent;      Review of systems are unobtainable  Past Medical History:   Diagnosis Date   • Asthma    • COPD (chronic obstructive pulmonary disease) (CMS/HCC)    • Diabetes mellitus (CMS/HCC)    • Disease of thyroid gland    • Hypertension    • Restless leg syndrome    • Seasonal allergies        Past Surgical History:   Procedure Laterality Date   • BLADDER REPAIR     • CLAVICLE SURGERY     • HERNIA REPAIR     • HYSTERECTOMY     • RECTAL PROLAPSE REPAIR     • TRACHEOSTOMY AND PEG TUBE INSERTION N/A 1/27/2021    Procedure: TRACHEOSTOMY AND PERCUTANEOUS ENDOSCOPIC GASTROSTOMY TUBE INSERTION;  Surgeon: Eliud Dumont MD;  Location: [facility];  Service: General;  Laterality: N/A;   • TUBAL ABDOMINAL LIGATION         Family History   Problem Relation Age of Onset   • Diabetes Mother    • Asthma Mother    • Diabetes Father    • COPD Father        Social History     Socioeconomic History   • Marital status:      Spouse name: Not on file   • Number of children: Not on file   • Years of education: Not on file   • Highest education level: Not on file   Tobacco Use   • Smoking status: Never Smoker   • Smokeless tobacco: Never Used   Substance and Sexual Activity   • Alcohol use: Never      Frequency: Never   • Drug use: Never   • Sexual activity: Never       Allergies   Allergen Reactions   • Actonel [Risedronate] Myalgia   • Hydrocodone Nausea Only   • Levaquin [Levofloxacin] Myalgia   • Other Myalgia     Ebista: legs hurt   • Stadol [Butorphanol] Other (See Comments)     Lowers blood pressure           Medication:    Current Facility-Administered Medications:   •  [DISCONTINUED] acetaminophen (TYLENOL) tablet 650 mg, 650 mg, Oral, Q4H PRN, 650 mg at 01/08/21 2142 **OR** acetaminophen (TYLENOL) 160 MG/5ML solution 650 mg, 650 mg, Per G Tube, Q4H PRN, 650 mg at 02/02/21 0838 **OR** acetaminophen (TYLENOL) suppository 650 mg, 650 mg, Rectal, Q4H PRN, Inez Alonzo, PharmD  •  albuterol sulfate HFA (PROVENTIL HFA;VENTOLIN HFA;PROAIR HFA) inhaler 4 puff, 4 puff, Inhalation, Q6H - RT, Eliud Dumont MD, 4 puff at 02/02/21 1928  •  ALPRAZolam (XANAX) tablet 0.5 mg, 0.5 mg, Oral, TID PRN, Elo Todd MD, 0.5 mg at 02/02/21 2034  •  artificial tears ophthalmic ointment, , Both Eyes, Q1H PRN, Eliud Dumont MD, Given at 01/23/21 0319  •  bisacodyl (DULCOLAX) suppository 10 mg, 10 mg, Rectal, Daily PRN, Eliud Dumont MD  •  chlorhexidine (PERIDEX) 0.12 % solution 15 mL, 15 mL, Mouth/Throat, Q12H, Eliud Dumont MD, 15 mL at 02/02/21 0825  •  dextrose (D50W) 25 g/ 50mL Intravenous Solution 25 g, 25 g, Intravenous, Q15 Min PRN, Eliud Dumont MD, 25 g at 02/01/21 1232  •  enoxaparin (LOVENOX) syringe 40 mg, 40 mg, Subcutaneous, Q24H, Andrea Burgos MD, 40 mg at 02/02/21 1003  •  furosemide (LASIX) 10 MG/ML injection  - ADS Override Pull, , , ,   •  furosemide (LASIX) injection 40 mg, 40 mg, Intravenous, Once, Anais May, APRN  •  HYDROcodone-acetaminophen (NORCO) 5-325 MG per tablet 1 tablet, 1 tablet, Oral, Q6H PRN, Elo Todd MD, 1 tablet at 02/02/21 0903  •  HYDROmorphone (DILAUDID) INFUSION (PCA 1 mg/mL syringe), 0.2-3 mg/hr, Intravenous, Titrated, Aubrey,  Andrea SAMUELS MD, Last Rate: 1.6 mL/hr at 02/02/21 0905, New Syringe/Cartridge at 02/02/21 1632  •  influenza vac split quad (FLUZONE,FLUARIX,AFLURIA,FLULAVAL) injection 0.5 mL, 0.5 mL, Intramuscular, During Hospitalization, Eliud Dumont MD  •  insulin detemir (LEVEMIR) injection 10 Units, 10 Units, Subcutaneous, Q12H, Sandie West, BEVERLY, 10 Units at 02/02/21 0829  •  insulin regular (humuLIN R,novoLIN R) injection 0-24 Units, 0-24 Units, Subcutaneous, Q6H, Eliud Dumont MD, 4 Units at 02/02/21 1808  •  insulin regular (humuLIN R,novoLIN R) injection 5 Units, 5 Units, Subcutaneous, Q6H, Eliud Dumont MD, 5 Units at 02/02/21 1809  •  Levothyroxine Sodium injection 75 mcg, 75 mcg, Intravenous, Daily, Eliud Dumont MD, 75 mcg at 02/02/21 1123  •  LORazepam (ATIVAN) injection 0.5 mg, 0.5 mg, Intravenous, Q4H PRN, Andrea Burgos MD, 0.5 mg at 02/02/21 2005  •  LORazepam (ATIVAN) injection 2 mg, 2 mg, Intravenous, Once, Anais May, APRN  •  Magnesium Sulfate 2 gram Bolus, followed by 8 gram infusion (total Mg dose 10 grams)- Mg less than or equal to 1mg/dL, 2 g, Intravenous, PRN **OR** Magnesium Sulfate 2 gram / 50mL Infusion (GIVE X 3 BAGS TO EQUAL 6GM TOTAL DOSE) - Mg 1.1 - 1.5 mg/dl, 2 g, Intravenous, PRN **OR** Magnesium Sulfate 4 gram infusion- Mg 1.6-1.9 mg/dL, 4 g, Intravenous, PRN, Eliud Dumont MD, Last Rate: 25 mL/hr at 01/30/21 0858, 4 g at 01/30/21 0858  •  midodrine (PROAMATINE) tablet 5 mg, 5 mg, Per G Tube, TID AC, Sandie West, APRN, Stopped at 02/02/21 1131  •  norepinephrine (LEVOPHED) 8 mg in 250 mL NS infusion (premix), 0.02-0.3 mcg/kg/min, Intravenous, Titrated, Anais May, BEVERLY, Stopped at 01/30/21 0656  •  nystatin (MYCOSTATIN) powder, , Topical, Q12H, Eliud Dumont MD, Given at 02/02/21 0828  •  OLANZapine (zyPREXA) tablet 10 mg, 10 mg, Oral, Daily, Carlton, Aubrie TORRES, DO, 10 mg at 02/02/21 1123  •  ondansetron (ZOFRAN) tablet 4 mg, 4 mg, Per G Tube, Q6H  PRN **OR** ondansetron (ZOFRAN) injection 4 mg, 4 mg, Intravenous, Q6H PRN, Inez Alonzo, PharmD  •  pantoprazole (PROTONIX) injection 40 mg, 40 mg, Intravenous, Q12H, Eliud Dumont MD, 40 mg at 02/02/21 0825  •  pneumococcal polysaccharide 23-valent (PNEUMOVAX-23) vaccine 0.5 mL, 0.5 mL, Intramuscular, During Hospitalization, Eliud Dumont MD  •  potassium chloride (KLOR-CON) packet 40 mEq, 40 mEq, Per PEG Tube, PRN, Inez Alonzo, PharmD  •  potassium phosphate 45 mmol in sodium chloride 0.9 % 250 mL infusion, 45 mmol, Intravenous, PRN **OR** potassium phosphate 30 mmol in sodium chloride 0.9 % 100 mL infusion, 30 mmol, Intravenous, PRN **OR** potassium phosphate 15 mmol in sodium chloride 0.9 % 100 mL infusion, 15 mmol, Intravenous, PRN **OR** sodium phosphates 45 mmol in sodium chloride 0.9 % 250 mL IVPB, 45 mmol, Intravenous, PRN **OR** sodium phosphates 30 mmol in sodium chloride 0.9 % 100 mL IVPB, 30 mmol, Intravenous, PRN, Last Rate: 25 mL/hr at 01/13/21 1254, 30 mmol at 01/13/21 1254 **OR** sodium phosphates 15 mmol in sodium chloride 0.9 % 100 mL IVPB, 15 mmol, Intravenous, PRN, Eliud Dumont MD, Last Rate: 50 mL/hr at 01/26/21 0916, 15 mmol at 01/26/21 0916  •  rOPINIRole (REQUIP) tablet 4 mg, 4 mg, Per G Tube, Nightly, Andera Burgos MD, 4 mg at 02/02/21 2034  •  sodium chloride 0.9 % flush 10 mL, 10 mL, Intravenous, PRN, Eliud Dumont MD, 10 mL at 02/01/21 0826    Antibiotics:  Anti-Infectives (From admission, onward)    Ordered     Dose/Rate Route Frequency Start Stop    01/27/21 1439  valACYclovir (VALTREX) tablet 1,000 mg     Ordering Provider: Harry Tran MD    1,000 mg Per G Tube Every 12 Hours Scheduled 01/27/21 1441 01/27/21 2042    01/22/21 1022  micafungin 100 mg/100 mL 0.9% NS IVPB (mbp)     Lee Moreno, PharmD reviewed the order on 01/29/21 1427.   Ordering Provider: Harry Tran MD    100 mg  over 60 Minutes Intravenous Every 24 Hours 01/22/21  1115 21 1219    21 1457  cefepime (MAXIPIME) 2 g/100 mL 0.9% NS (mbp)     Ordering Provider: Harry Tran MD    2 g  200 mL/hr over 30 Minutes Intravenous Once 21 1545 21 1630    21 1345  levoFLOXacin (LEVAQUIN) 500 mg/100 mL D5W (premix) (LEVAQUIN) 500 mg     Cornelia Tompkins, PharmD reviewed the order on 01/15/21 0713.   Ordering Provider: Harry Tran MD    500 mg Intravenous Every 24 Hours 21 1500 21 1412    21 0917  piperacillin-tazobactam (ZOSYN) 3.375 g in iso-osmotic dextrose 50 ml (premix)     Ordering Provider: Adelso Bach MD    3.375 g  over 30 Minutes Intravenous Once 21 1015 21 1052    21 1402  remdesivir 100 mg in sodium chloride 0.9 % 250 mL IVPB (powder vial)     Cornelia Tompkins, PharmD reviewed the order on 01/15/21 1335.   Ordering Provider: Larry Hawkins PA    100 mg  over 60 Minutes Intravenous Every 24 Hours 01/10/21 1200 21 1229    21 1801  remdesivir 100 mg in sodium chloride 0.9 % 250 mL IVPB (powder vial)     Ordering Provider: Mena Ribeiro APRN    100 mg  over 60 Minutes Intravenous Every 24 Hours 21 1800 21 1912    21 1801  remdesivir 200 mg in sodium chloride 0.9 % 250 mL IVPB (powder vial)     Ordering Provider: Mena Ribeiro APRN    200 mg  over 60 Minutes Intravenous Every 24 Hours 21 2200 21 2242            Review of Systems:  See HPI      Physical Exam:   Vital Signs  Temp (24hrs), Av.1 °F (37.8 °C), Min:98.6 °F (37 °C), Max:101.4 °F (38.6 °C)    Temp  Min: 98.6 °F (37 °C)  Max: 101.4 °F (38.6 °C)  BP  Min: 91/48  Max: 164/71  Pulse  Min: 75  Max: 111  Resp  Min: 26  Max: 46  SpO2  Min: 85 %  Max: 99 %    GENERAL: Resting quietly on vent  fio2 50%   HEENT: Normocephalic, atraumatic.  No ext oral lesions  Tracheostomy present  HEART: Normal rate by monitor S1-S2  LUNGS: symmetrical inspiration; on vent;   ABDOMEN: nondistended.   Soft  Musculoskeletal: No joint deformity    SKIN: No rashes on exposed skin    Laboratory Data    Results from last 7 days   Lab Units 02/02/21  0440 02/01/21  0737 01/31/21  0316   WBC 10*3/mm3 7.94 7.51 8.21   HEMOGLOBIN g/dL 7.3* 7.7* 7.9*   HEMATOCRIT % 24.1* 24.9* 26.5*   PLATELETS 10*3/mm3 307 299 295     Results from last 7 days   Lab Units 02/02/21  0440   SODIUM mmol/L 135*   POTASSIUM mmol/L 4.4   CHLORIDE mmol/L 102   CO2 mmol/L 23.0   BUN mg/dL 15   CREATININE mg/dL 0.58   GLUCOSE mg/dL 124*   CALCIUM mg/dL 8.6     Results from last 7 days   Lab Units 02/02/21  0440   ALK PHOS U/L 62   BILIRUBIN mg/dL 0.3   ALT (SGPT) U/L 12   AST (SGOT) U/L 16         Results from last 7 days   Lab Units 02/02/21  0440   CRP mg/dL 36.64*         Results from last 7 days   Lab Units 02/02/21  0440 02/01/21  0332 01/31/21  0316   CK TOTAL U/L 41 24 26         Estimated Creatinine Clearance: 74.7 mL/min (by C-G formula based on SCr of 0.58 mg/dL).      Microbiology:  Microbiology Results (last 10 days)     Procedure Component Value - Date/Time    Herpes Simplex Virus Culture - Swab, Oropharynx [[id number]] Collected: 01/27/21 0004    Lab Status: Final result Specimen: Swab from Oropharynx Updated: 01/29/21 1908     HSV Culture Without Typing Negative    Narrative:      Performed at:  [address] [place] [address]  [id number]  : Steve Cooper PhD, Phone:  [phone]                Radiology:  Imaging Results (Last 72 Hours)     Procedure Component Value Units Date/Time    XR Chest 1 View [[id number]] Collected: 02/02/21 0842     Updated: 02/02/21 1056    Narrative:      EXAMINATION: XR CHEST 1 VW-      INDICATION: J96.01-Acute respiratory failure with hypoxia;  U07.1-COVID-19; J12.82-Pneumonia due to Coronavirus disease 2019;  J93.9-Pneumothorax, unspecified.      COMPARISON: Chest x-ray 02/01/2021.     FINDINGS: Interval removal of right chest tube with a left chest tube  remain in place.  No discrete pneumothorax with bilateral dense pulmonary  opacifications stable to slightly increased from prior comparison along  with probable trace right and small left pleural effusion.           Impression:      Interval removal of right chest tube with a left chest tube  remain in place. No discrete pneumothorax with bilateral dense pulmonary  opacifications stable to slightly increased from prior comparison along  with probable trace right and small left pleural effusion.     D:  02/02/2021  E:  02/02/2021     This report was finalized on 2/2/2021 10:53 AM by Dr. Mgiuel Angel Hightower.       XR Chest 1 View [[id number]] Collected: 02/01/21 1249     Updated: 02/01/21 1642    Narrative:      EXAMINATION: XR CHEST 1 VW-02/01/2021:      INDICATION: Evaluate R lung; chest tube clamped; J96.01-Acute  respiratory failure with hypoxia; U07.1-COVID-19; J12.82-Pneumonia due  to Coronavirus disease 2019; J93.9-Pneumothorax, unspecified;  J93.9-Pneumothorax, unspecified.      COMPARISON: NONE.     FINDINGS: Portable chest reveals bilateral chest tubes in place. No  definite pneumothorax identified on the right. Increased markings seen  diffusely throughout the lung fields bilaterally. PICC line catheter is  stable. Tracheostomy tube in satisfactory position.       Impression:      No definite pneumothorax identified on the right. Chest is  stable.     D:  02/01/2021  E:  02/01/2021     This report was finalized on 2/1/2021 4:39 PM by Dr. Vita De Jesus MD.       XR Chest 1 View [[id number]] Collected: 02/01/21 0921     Updated: 02/01/21 1641    Narrative:      EXAMINATION: XR CHEST 1 VW- 02/01/2021     INDICATION: J96.01-Acute respiratory failure with hypoxia;  U07.1-COVID-19; J12.82-Pneumonia due to Coronavirus disease 2019;  J93.9-Pneumothorax, unspecified; J93.9-Pneumothorax, unspecified      COMPARISON: 01/31/2021     FINDINGS: Patchy airspace disease seen diffusely throughout the lung  fields bilaterally. Bilateral chest  tubes in place. There is a  tracheostomy tube in satisfactory position above the frankie.  Degenerative changes seen within the spine. Small bilateral pleural  effusions.          Impression:      Patchy airspace disease seen stable throughout the lung  fields bilaterally. Bilateral chest tubes in place with no pneumothorax.  Tracheostomy tube in satisfactory position.     D:  02/01/2021  E:  02/01/2021     This report was finalized on 2/1/2021 4:38 PM by Dr. Vita De Jesus MD.       XR Chest 1 View [[id number]] Collected: 01/31/21 0745     Updated: 01/31/21 0750    Narrative:      EXAMINATION: XR CHEST 1 VW-      INDICATION: resp failure; J96.01-Acute respiratory failure with hypoxia;  U07.1-COVID-19; J12.82-Pneumonia due to Coronavirus disease 2019;  J93.9-Pneumothorax, unspecified; J93.9-Pneumothorax, unspecified      COMPARISON: 1/30/2021     FINDINGS: Screw plate fixation of the right clavicle. Stable support  hardware. Cardiac silhouette is obscured. Low lung volumes with  extensive bilateral airspace opacities and prominent interstitial  markings. Small left pleural effusion. No pneumothorax.       Impression:      No interval change.     This report was finalized on 1/31/2021 7:47 AM by Nolbe Rojas.           Estimated Creatinine Clearance: 74.7 mL/min (by C-G formula based on SCr of 0.58 mg/dL).    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      PLAN/RECOMMENDATIONS:   Thank you for asking us to see Mei Gunter, I recommend the following:  - Monitor oxygen requirements    - Continue anticoagulation per pulmonary critical care  - Continue steroids per pulmonary critical care  - s/p  remdesivir  S/p Actemra 8 mg/kg IV 1/20/21 (this lasts two weeks)  Status post course remdesivir        Unclear significance of stenotrophomonas;    Observe off antimicrobials for now    Patient at increased risk for vent associated pneumonia, cdiff colitis, line infection;    If fevers continue will need to pan cultures    Patient has been in ICU for 24 days    Guarded prognosis        She is critically ill    Harry Tran MD  2/2/2021  20:58 EST

## 2021-02-03 NOTE — PROGRESS NOTES
Continued Stay Note  [facility]     Patient Name: Mei Gunter  MRN: [id number]  Today's Date: 2/3/2021    Admit Date: 1/8/2021    Discharge Plan     Row Name 02/03/21 1109       Plan    Plan  LTACH    Plan Comments  Patient sedated on vent.  Discussed in MDR.  To try to wean sedating gtt, patient still has one chest tube.  Still reunning fevers.  [facility] and [facility] continue to follow.  Daughter's first choice is [facility].  CM will continue to follow.        Discharge Codes    No documentation.       Expected Discharge Date and Time     Expected Discharge Date Expected Discharge Time    Feb 8, 2021             Kay Gutierrez RN

## 2021-02-03 NOTE — PROGRESS NOTES
INFECTIOUS DISEASE CONSULT/INITIAL HOSPITAL VISIT    Mei Gunter  1945  [id number]    Date of Consult: 2/3/2021    Admission Date: 1/8/2021      Requesting Provider: Dmitry Hernandez MD  Evaluating Physician: Harry Tran III, MD    Reason for Consultation: COVID-19 acute respiratory failure     History of present illness:    Patient is a 76 y.o. female with h/o COPD, T2DM, hypothyroidism, and HTN who presented to [facility] ED on 1/8/21 with increasing shortness of breath, weakness, fever, and dark black diarrhea.  The patient had shortness of breath and cough that started about 10 days ago with fever about 5 days ago of greater than 100F.  She tested positive for COVID-19 on or about 1/4/21 and caught COVID-19 from her son.  She was placed on Keflex by her PCP for possible bronchitis.  She also had nausea, vomiting, diarrhea and was waiting for Cdiff toxin test result.  She was having up to 5 to 10 stools a day and towards the latter part of the diarrhea, it had become sticky and slimy.  She denies dysuria or rashes.  Work up showed Tmax 100.8, worsening hypoxia now requiring 70% FiO2 45L O2 by high flow oxygen, A1C 6.8, lactic acid 1.2, D-dimer 1.8-->1.52, WBC 5700 with 86% neutrophils, Ferritin 334-->334, CRP 19.1-->31.75, -->520, proBNP 492, PCT 0.13, , and creatinine 1.02. A respiratory panel PCR is positive for COVID-19.  Blood cultures are pending. A CXR shows diffuse multifocal airspace disease.  She is currently on Remdesivir, Dexamethasone, and Lovenox.  ID was asked to evaluate and manage her antimicrobial therapy.     1/10/2021 patient in transfer the intensive care unit because of worsening hypoxia is currently intubated and sedated review of systems are unobtainable  1/11/21; patient is critically ill on pressor, vent, sedated fio2 45%; ros unobtainable  1/12/21; no events overnight; off of pressors, on vent; on tube feeds; no fever, ros unobtainable  1/13/21; on increasing fio2, sedation,  on vent, on pressors; started on zosyn.  On intermittent paralytics    1/14/21; patient is critically ill on vent peep 14, low dose pressors, sedated ros unobtainable    1/15/2021 patient is on low-dose Levophed likely for decreased vascular tone from sedation; patient is not requiring paralytics; FiO2 is down to 40%; patient is afebrile and hemodynamically stable with the exception of low-dose hypotension.  Review of systems are unobtainable    1/18/2021 patient on FiO2 of 40% propofol turned off patient remains on fentanyl Versed opens eyes to noxious stimulation afebrile normotensive  1/19/21; patient remains on vent, peep at 8, tolerating tube feeds; sedated ros unobtainable; afebrile, hemodynamically stable.  1/20/21; has fever, elevation of wbc, good response to bumex yesterday; on fio2 50%; started on levophed drip for hypotension. Remains critically ill. Had left sided pneumothorax treated with chest tube (pigtail drain)    1/21/2021; having low-grade temperatures remains intubated sedated on fentanyl drip given Ativan as needed for agitation restlessness; review of systems are unobtainable patient remains critically ill    1/22/21; no events overnight; afebrile, patient now on versed, fentanyl, remains intubated on fio2 40%; ros unobtainable    1/23/2021 patient is critically ill afebrile hemodynamically stable remains on FiO2 sedation is difficult patient is receiving as needed Ativan patient has been on Bumex drip review of systems are unobtainable    1/24/21; patient remains critically ill; on vent, on dilaudid drip for sedation  abx changed to ceftazidime last night;   Has low grade fevers on fio2 40%.  Ros unobtainable    1/25/2021 patient now has right-sided pneumothorax had chest tube placed today patient sedated on ventilator is hemodynamically stable has low-grade temperatures review of systems are unobtainable    1/26/21; no events overnight; remains critically ill remains on vent, sedation, ros  unobtainable    1/27/21; patient remains critically ill; on vent; returned from the OR for trach and peg today; has been diuresed today patient sedated on FiO2 40% no distress has been off of airborne precautions now    1/28/2021 patient remains on Dilaudid drip plan is to decrease this sedation patient is on low-dose of Levophed.  Patient is confused not following commands patient remains on vent receiving blood products today    1/29/21 patient is off of Dilaudid drip is resting quietly on ventilator patient opens eyes to voice but cannot follow commands    2/1/21; patient has fever now; is on fio2 50% resting quietly; on dilaudid drip.    2/2/21; continues to have fever, otherwise hemodynamically stable; seroquel held; on vent;    2/3/2021; has fevers has been started on Zyprexa is on Dilaudid drip remains intubated patient is sedated          Review of systems are unobtainable  Past Medical History:   Diagnosis Date   • Asthma    • COPD (chronic obstructive pulmonary disease) (CMS/HCC)    • Diabetes mellitus (CMS/[facility])    • Disease of thyroid gland    • Hypertension    • Restless leg syndrome    • Seasonal allergies        Past Surgical History:   Procedure Laterality Date   • BLADDER REPAIR     • CLAVICLE SURGERY     • HERNIA REPAIR     • HYSTERECTOMY     • RECTAL PROLAPSE REPAIR     • TRACHEOSTOMY AND PEG TUBE INSERTION N/A 1/27/2021    Procedure: TRACHEOSTOMY AND PERCUTANEOUS ENDOSCOPIC GASTROSTOMY TUBE INSERTION;  Surgeon: Eliud Dumont MD;  Location: [facility];  Service: General;  Laterality: N/A;   • TUBAL ABDOMINAL LIGATION         Family History   Problem Relation Age of Onset   • Diabetes Mother    • Asthma Mother    • Diabetes Father    • COPD Father        Social History     Socioeconomic History   • Marital status:      Spouse name: Not on file   • Number of children: Not on file   • Years of education: Not on file   • Highest education level: Not on file   Tobacco Use   • Smoking status:  Never Smoker   • Smokeless tobacco: Never Used   Substance and Sexual Activity   • Alcohol use: Never     Frequency: Never   • Drug use: Never   • Sexual activity: Never       Allergies   Allergen Reactions   • Actonel [Risedronate] Myalgia   • Hydrocodone Nausea Only   • Levaquin [Levofloxacin] Myalgia   • Other Myalgia     Ebista: legs hurt   • Stadol [Butorphanol] Other (See Comments)     Lowers blood pressure           Medication:    Current Facility-Administered Medications:   •  [DISCONTINUED] acetaminophen (TYLENOL) tablet 650 mg, 650 mg, Oral, Q4H PRN, 650 mg at 01/08/21 2142 **OR** acetaminophen (TYLENOL) 160 MG/5ML solution 650 mg, 650 mg, Per G Tube, Q4H PRN, 650 mg at 02/02/21 2132 **OR** acetaminophen (TYLENOL) suppository 650 mg, 650 mg, Rectal, Q4H PRN, Inze Alonzo, PharmD, 650 mg at 02/03/21 0131  •  albuterol sulfate HFA (PROVENTIL HFA;VENTOLIN HFA;PROAIR HFA) inhaler 4 puff, 4 puff, Inhalation, Q6H - RT, Eliud Dumont MD, 4 puff at 02/03/21 0705  •  ALPRAZolam (XANAX) tablet 0.5 mg, 0.5 mg, Oral, TID PRN, Elo Todd MD, 0.5 mg at 02/02/21 2034  •  artificial tears ophthalmic ointment, , Both Eyes, Q1H PRN, Eliud Dumont MD, Given at 01/23/21 0319  •  bisacodyl (DULCOLAX) suppository 10 mg, 10 mg, Rectal, Daily PRN, Eliud Dumont MD  •  chlorhexidine (PERIDEX) 0.12 % solution 15 mL, 15 mL, Mouth/Throat, Q12H, Eliud Dumont MD, 15 mL at 02/02/21 2119  •  dextrose (D50W) 25 g/ 50mL Intravenous Solution 25 g, 25 g, Intravenous, Q15 Min PRN, Eliud Dumont MD, 25 g at 02/01/21 1232  •  enoxaparin (LOVENOX) syringe 40 mg, 40 mg, Subcutaneous, Q24H, Andrea Burgos MD, 40 mg at 02/02/21 1003  •  HYDROcodone-acetaminophen (NORCO) 5-325 MG per tablet 1 tablet, 1 tablet, Oral, Q6H PRN, Elo Todd MD, 1 tablet at 02/02/21 0903  •  HYDROmorphone (DILAUDID) INFUSION (PCA 1 mg/mL syringe), 0.2-3 mg/hr, Intravenous, Titrated, Andrea Burgos MD, Last Rate:  2.4 mL/hr at 02/03/21 0020, New Syringe/Cartridge at 02/03/21 0427  •  influenza vac split quad (FLUZONE,FLUARIX,AFLURIA,FLULAVAL) injection 0.5 mL, 0.5 mL, Intramuscular, During Hospitalization, Eliud Dumont MD  •  insulin detemir (LEVEMIR) injection 10 Units, 10 Units, Subcutaneous, Q12H, Sandie West APRN, Stopped at 02/02/21 2155  •  insulin regular (humuLIN R,novoLIN R) injection 0-24 Units, 0-24 Units, Subcutaneous, Q6H, Eliud Dumont MD, 4 Units at 02/02/21 1808  •  insulin regular (humuLIN R,novoLIN R) injection 5 Units, 5 Units, Subcutaneous, Q6H, Eliud Dumont MD, 5 Units at 02/03/21 0523  •  Levothyroxine Sodium injection 75 mcg, 75 mcg, Intravenous, Daily, Eliud Dumont MD, 75 mcg at 02/02/21 1123  •  LORazepam (ATIVAN) injection 0.5 mg, 0.5 mg, Intravenous, Q4H PRN, Andrea Burgos MD, 0.5 mg at 02/03/21 0502  •  Magnesium Sulfate 2 gram Bolus, followed by 8 gram infusion (total Mg dose 10 grams)- Mg less than or equal to 1mg/dL, 2 g, Intravenous, PRN **OR** Magnesium Sulfate 2 gram / 50mL Infusion (GIVE X 3 BAGS TO EQUAL 6GM TOTAL DOSE) - Mg 1.1 - 1.5 mg/dl, 2 g, Intravenous, PRN **OR** Magnesium Sulfate 4 gram infusion- Mg 1.6-1.9 mg/dL, 4 g, Intravenous, PRN, Eliud Dumont MD, Last Rate: 25 mL/hr at 01/30/21 0858, 4 g at 01/30/21 0858  •  midodrine (PROAMATINE) tablet 5 mg, 5 mg, Per G Tube, TID AC, Sandie West APRN, Stopped at 02/02/21 1131  •  norepinephrine (LEVOPHED) 8 mg in 250 mL NS infusion (premix), 0.02-0.3 mcg/kg/min, Intravenous, Titrated, Anais May, APRN, Stopped at 01/30/21 0656  •  nystatin (MYCOSTATIN) powder, , Topical, Q12H, Eliud Dumont MD, Given at 02/02/21 2119  •  OLANZapine (zyPREXA) tablet 10 mg, 10 mg, Oral, Daily, Case, Aubrie VJaswinder, DO, 10 mg at 02/02/21 1123  •  ondansetron (ZOFRAN) tablet 4 mg, 4 mg, Per G Tube, Q6H PRN **OR** ondansetron (ZOFRAN) injection 4 mg, 4 mg, Intravenous, Q6H PRN, Inez Alonzo, PharmD  •  pantoprazole  (PROTONIX) injection 40 mg, 40 mg, Intravenous, Q12H, Eliud Dumont MD, 40 mg at 02/02/21 2119  •  pneumococcal polysaccharide 23-valent (PNEUMOVAX-23) vaccine 0.5 mL, 0.5 mL, Intramuscular, During Hospitalization, Eliud Dumont MD  •  potassium chloride (KLOR-CON) packet 40 mEq, 40 mEq, Per PEG Tube, PRN, Inez Alonzo, PharmD  •  potassium phosphate 45 mmol in sodium chloride 0.9 % 250 mL infusion, 45 mmol, Intravenous, PRN **OR** potassium phosphate 30 mmol in sodium chloride 0.9 % 100 mL infusion, 30 mmol, Intravenous, PRN **OR** potassium phosphate 15 mmol in sodium chloride 0.9 % 100 mL infusion, 15 mmol, Intravenous, PRN **OR** sodium phosphates 45 mmol in sodium chloride 0.9 % 250 mL IVPB, 45 mmol, Intravenous, PRN **OR** sodium phosphates 30 mmol in sodium chloride 0.9 % 100 mL IVPB, 30 mmol, Intravenous, PRN, Last Rate: 25 mL/hr at 01/13/21 1254, 30 mmol at 01/13/21 1254 **OR** sodium phosphates 15 mmol in sodium chloride 0.9 % 100 mL IVPB, 15 mmol, Intravenous, PRN, Eliud Dumont MD, Last Rate: 50 mL/hr at 01/26/21 0916, 15 mmol at 01/26/21 0916  •  rOPINIRole (REQUIP) tablet 4 mg, 4 mg, Per G Tube, Nightly, Andrea Burgos MD, 4 mg at 02/02/21 2034  •  sodium chloride 0.9 % flush 10 mL, 10 mL, Intravenous, PRN, Eliud Dumont MD, 10 mL at 02/01/21 0826    Antibiotics:  Anti-Infectives (From admission, onward)    Ordered     Dose/Rate Route Frequency Start Stop    01/27/21 1439  valACYclovir (VALTREX) tablet 1,000 mg     Ordering Provider: Harry Tran MD    1,000 mg Per G Tube Every 12 Hours Scheduled 01/27/21 1441 01/27/21 2042 01/22/21 1022  micafungin 100 mg/100 mL 0.9% NS IVPB (mbp)     Lee Moreno, PharmD reviewed the order on 01/29/21 1427.   Ordering Provider: Harry Tran MD    100 mg  over 60 Minutes Intravenous Every 24 Hours 01/22/21 1115 02/01/21 1219    01/20/21 1457  cefepime (MAXIPIME) 2 g/100 mL 0.9% NS (mbp)     Ordering Provider: Chelsea  Harry Samuel MD    2 g  200 mL/hr over 30 Minutes Intravenous Once 21 1545 21 1630    21 1345  levoFLOXacin (LEVAQUIN) 500 mg/100 mL D5W (premix) (LEVAQUIN) 500 mg     Cornelia Tompkins, PharmD reviewed the order on 01/15/21 0713.   Ordering Provider: Harry Tran MD    500 mg Intravenous Every 24 Hours 21 1500 21 1412    21 0917  piperacillin-tazobactam (ZOSYN) 3.375 g in iso-osmotic dextrose 50 ml (premix)     Ordering Provider: Adelso Bach MD    3.375 g  over 30 Minutes Intravenous Once 21 1015 21 1052    21 1402  remdesivir 100 mg in sodium chloride 0.9 % 250 mL IVPB (powder vial)     Cornelia Tompkins, PharmD reviewed the order on 01/15/21 1335.   Ordering Provider: Larry Hawkins PA    100 mg  over 60 Minutes Intravenous Every 24 Hours 01/10/21 1200 21 1229    21 1801  remdesivir 100 mg in sodium chloride 0.9 % 250 mL IVPB (powder vial)     Ordering Provider: Mena Ribeiro APRN    100 mg  over 60 Minutes Intravenous Every 24 Hours 21 1800 21 1912    21 1801  remdesivir 200 mg in sodium chloride 0.9 % 250 mL IVPB (powder vial)     Ordering Provider: Mena Ribeiro APRN    200 mg  over 60 Minutes Intravenous Every 24 Hours 21 2200 21 2242            Review of Systems:  See HPI      Physical Exam:   Vital Signs  Temp (24hrs), Av.1 °F (38.4 °C), Min:99.7 °F (37.6 °C), Max:102.1 °F (38.9 °C)    Temp  Min: 99.7 °F (37.6 °C)  Max: 102.1 °F (38.9 °C)  BP  Min: 83/52  Max: 164/71  Pulse  Min: 79  Max: 120  Resp  Min: 30  Max: 58  SpO2  Min: 85 %  Max: 100 %    GENERAL: Resting quietly on vent  fio2 50%   HEENT: Normocephalic, atraumatic.  No ext oral lesions  Tracheostomy present  HEART: Normal rate by monitor S1-S2  LUNGS: symmetrical inspiration; on vent;   ABDOMEN: nondistended.  Nontender    Skin no joint deformity    SKIN: No rashes on exposed skin    Laboratory Data    Results from last 7 days    Lab Units 02/03/21  0340 02/02/21  2109 02/02/21  0440   WBC 10*3/mm3 8.82 11.98* 7.94   HEMOGLOBIN g/dL 7.9* 8.8* 7.3*   HEMATOCRIT % 26.2* 29.3* 24.1*   PLATELETS 10*3/mm3 354 412 307     Results from last 7 days   Lab Units 02/03/21  0340   SODIUM mmol/L 138   POTASSIUM mmol/L 4.7   CHLORIDE mmol/L 104   CO2 mmol/L 24.0   BUN mg/dL 19   CREATININE mg/dL 0.59   GLUCOSE mg/dL 122*   CALCIUM mg/dL 8.7     Results from last 7 days   Lab Units 02/03/21  0340   ALK PHOS U/L 75   BILIRUBIN mg/dL 0.3   ALT (SGPT) U/L 13   AST (SGOT) U/L 17         Results from last 7 days   Lab Units 02/03/21  0340   CRP mg/dL 33.03*         Results from last 7 days   Lab Units 02/03/21  0340 02/02/21  0440 02/01/21  0332   CK TOTAL U/L 61 41 24         Estimated Creatinine Clearance: 74.7 mL/min (by C-G formula based on SCr of 0.59 mg/dL).      Microbiology:  Microbiology Results (last 10 days)     Procedure Component Value - Date/Time    Herpes Simplex Virus Culture - Swab, Oropharynx [[id number]] Collected: 01/27/21 0004    Lab Status: Final result Specimen: Swab from Oropharynx Updated: 01/29/21 1908     HSV Culture Without Typing Negative    Narrative:      Performed at:  [address] [place] [address]  [id number]  : Steve Cooper PhD, Phone:  [phone]                Radiology:  Imaging Results (Last 72 Hours)     Procedure Component Value Units Date/Time    XR Chest 1 View [[id number]] Resulted: 02/03/21 0322     Updated: 02/03/21 0332    XR Chest 1 View [[id number]] Collected: 02/02/21 2149     Updated: 02/02/21 2151    Narrative:      CR Chest 1 Vw    INDICATION:   Change in respiratory status     COMPARISON:    Same date at 4:55 AM    FINDINGS:  Single portable AP view(s) of the chest.    Support lines and tubes are unchanged.    Prominent patchy airspace disease is demonstrated bilaterally. This shows modest improvement when compared to the prior study of the same date. No pneumothorax. No  obvious pleural fluid.      Impression:      Prominent bilateral patchy airspace disease which shows modest improvement when compared to the prior study of the same date.    Signer Name: Martin Arteaga MD   Signed: 2/2/2021 9:49 PM   Workstation Name: [id number]    [facility]    XR Chest 1 View [[id number]] Collected: 02/02/21 0842     Updated: 02/02/21 1056    Narrative:      EXAMINATION: XR CHEST 1 VW-      INDICATION: J96.01-Acute respiratory failure with hypoxia;  U07.1-COVID-19; J12.82-Pneumonia due to Coronavirus disease 2019;  J93.9-Pneumothorax, unspecified.      COMPARISON: Chest x-ray 02/01/2021.     FINDINGS: Interval removal of right chest tube with a left chest tube  remain in place. No discrete pneumothorax with bilateral dense pulmonary  opacifications stable to slightly increased from prior comparison along  with probable trace right and small left pleural effusion.           Impression:      Interval removal of right chest tube with a left chest tube  remain in place. No discrete pneumothorax with bilateral dense pulmonary  opacifications stable to slightly increased from prior comparison along  with probable trace right and small left pleural effusion.     D:  02/02/2021  E:  02/02/2021     This report was finalized on 2/2/2021 10:53 AM by Dr. Miguel Angel Hightower.       XR Chest 1 View [[id number]] Collected: 02/01/21 1249     Updated: 02/01/21 1642    Narrative:      EXAMINATION: XR CHEST 1 VW-02/01/2021:      INDICATION: Evaluate R lung; chest tube clamped; J96.01-Acute  respiratory failure with hypoxia; U07.1-COVID-19; J12.82-Pneumonia due  to Coronavirus disease 2019; J93.9-Pneumothorax, unspecified;  J93.9-Pneumothorax, unspecified.      COMPARISON: NONE.     FINDINGS: Portable chest reveals bilateral chest tubes in place. No  definite pneumothorax identified on the right. Increased markings seen  diffusely throughout the lung fields bilaterally. PICC line catheter is  stable.  Tracheostomy tube in satisfactory position.       Impression:      No definite pneumothorax identified on the right. Chest is  stable.     D:  02/01/2021  E:  02/01/2021     This report was finalized on 2/1/2021 4:39 PM by Dr. Vita De Jesus MD.       XR Chest 1 View [[id number]] Collected: 02/01/21 0921     Updated: 02/01/21 1641    Narrative:      EXAMINATION: XR CHEST 1 VW- 02/01/2021     INDICATION: J96.01-Acute respiratory failure with hypoxia;  U07.1-COVID-19; J12.82-Pneumonia due to Coronavirus disease 2019;  J93.9-Pneumothorax, unspecified; J93.9-Pneumothorax, unspecified      COMPARISON: 01/31/2021     FINDINGS: Patchy airspace disease seen diffusely throughout the lung  fields bilaterally. Bilateral chest tubes in place. There is a  tracheostomy tube in satisfactory position above the frankie.  Degenerative changes seen within the spine. Small bilateral pleural  effusions.          Impression:      Patchy airspace disease seen stable throughout the lung  fields bilaterally. Bilateral chest tubes in place with no pneumothorax.  Tracheostomy tube in satisfactory position.     D:  02/01/2021  E:  02/01/2021     This report was finalized on 2/1/2021 4:38 PM by Dr. Vita De Jesus MD.           Estimated Creatinine Clearance: 74.7 mL/min (by C-G formula based on SCr of 0.59 mg/dL).    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    Patient given multiple course of antibiotics occluding Zosyn.  Levaquin.  Ceftazidime.  And vancomycin.      Patient cannot be weaned off ventilator will likely have recurrent pneumonia.    Patient is up in the hospital 26 days and in the ICU 25 days    Very poor overall prognosis    Check blood cultures x2 sets, urine culture, sputum culture    Observe of antibiotics for the time being    Patient is critically ill    Harry Tran MD  2/3/2021  08:00 EST

## 2021-02-03 NOTE — PROGRESS NOTES
INTENSIVIST   PROGRESS NOTE     Hospital:  LOS: 26 days     Ms. Mei Gunter, 76 y.o. female is followed for a Chief Complaint of: Respiratory Failure      Subjective   S     Interval History:  Febrile to 102.8 last night.  Left chest tube with 180cc of output.        The patient's relevant past medical, surgical and social history were reviewed and updated in Epic as appropriate.      ROS: Unable to obtain secondary to mental status and mechanical ventilation.     Objective   O     Vitals:  Temp  Min: 99.7 °F (37.6 °C)  Max: 102.1 °F (38.9 °C)  BP  Min: 83/52  Max: 159/74  Pulse  Min: 79  Max: 120  Resp  Min: 30  Max: 58  SpO2  Min: 90 %  Max: 100 % No data recorded    Intake/Ouptut 24 hrs (7:00AM - 6:59 AM)  Intake & Output (last 3 days)       01/31 0701 - 02/01 0700 02/01 0701 - 02/02 0700 02/02 0701 - 02/03 0700 02/03 0701 - 02/04 0700    I.V. (mL/kg) 317 (2.8) 261.5 (2.3) 248.4 (2.2)     Other 350 785 672     NG/ 881 811     IV Piggyback 400 350      Total Intake(mL/kg) 1565 (14) 2277.5 (20.3) 1731.4 (15.5)     Urine (mL/kg/hr) 780 (0.3) 700 (0.3) 1850 (0.7)     Emesis/NG output        Chest Tube 194 180 180     Total Output      Net +591 +1397.5 -298.6             Urine Unmeasured Occurrence  1 x 2 x           Medications (drips):  HYDROmorphone 1 mg/ml, Last Rate: 2.4 mg/hr (02/03/21 0020)        Mechanical Ventilator Settings:          Resp Rate (Set): 26     FiO2 (%): 50 %  PEEP/CPAP (cm H2O): 5 cm H20    Minute Ventilation (L/min) (Obs): 10.9 L/min  Resp Rate (Observed) Vent: 28  I:E Ratio (Set): 1:0.58  I:E Ratio (Obs): 1:1.70    PIP Observed (cm H2O): 33 cm H2O  Plateau Pressure (cm H2O): 35 cm H2O    Physical Examination  Telemetry:  Normal sinus rhythm.    Constitutional:  No acute distress.  Trach in place on mechanical ventilation.    Eyes: No scleral icterus.   PERRL, EOM intact.    Neck:  Supple, FROM   Cardiovascular: Normal rate, regular and rhythm. Normal heart sounds.  No  murmurs, gallop or rub.   Respiratory: No respiratory distress. Normal respiratory effort.  Diminished bilaterally. No wheezing.    Left chest tube in place with serosanguinous drainage.    Abdominal:  Soft. No masses. Nontender. No distension. No HSM.   Extremities: No digital cyanosis. No clubbing.  No peripheral edema.   Skin: No rashes, lesions or ulcers   Neurological:   Moves eyes and extremities.   Does not follow commands.             Results from last 7 days   Lab Units 02/03/21 0340 02/02/21 2109 02/02/21 0440   WBC 10*3/mm3 8.82 11.98* 7.94   HEMOGLOBIN g/dL 7.9* 8.8* 7.3*   MCV fL 92.3 92.1 93.4   PLATELETS 10*3/mm3 354 412 307     Results from last 7 days   Lab Units 02/03/21 0340 02/02/21 2109 02/02/21 0440 02/01/21  0332   SODIUM mmol/L 138 135* 135* 137  137   POTASSIUM mmol/L 4.7 4.7 4.4 4.6  4.5   CO2 mmol/L 24.0 23.0 23.0 18.0*  22.0   CREATININE mg/dL 0.59 0.60 0.58 0.58  0.54*   GLUCOSE mg/dL 122* 104* 124* 135*  141*   MAGNESIUM mg/dL 1.9 2.0 2.0 2.4  2.1   PHOSPHORUS mg/dL 2.2*  --  2.9 3.2  3.2     Estimated Creatinine Clearance: 74.7 mL/min (by C-G formula based on SCr of 0.59 mg/dL).  Results from last 7 days   Lab Units 02/03/21 0340 02/02/21 0440 02/01/21  0332   ALK PHOS U/L 75 62 54  55   BILIRUBIN mg/dL 0.3 0.3 0.2  0.3   ALT (SGPT) U/L 13 12 17  18   AST (SGOT) U/L 17 16 21  21             Images:  Imaging Results (Last 24 Hours)     Procedure Component Value Units Date/Time    XR Chest 1 View [[id number]] Collected: 02/03/21 0827     Updated: 02/03/21 0831    Narrative:      EXAMINATION: XR CHEST 1 VW-      INDICATION: resp failure; J96.01-Acute respiratory failure with hypoxia;  U07.1-COVID-19; J12.82-Pneumonia due to Coronavirus disease 2019;  J93.9-Pneumothorax, unspecified; J93.9-Pneumothorax, unspecified      COMPARISON: One day prior     FINDINGS: Support hardware projects unchanged. Unchanged bilateral dense  pulmonary opacifications. No distinct  pneumothorax. Heart and  mediastinal contours remain obscured.       Impression:      No significant interval change.     This report was finalized on 2/3/2021 8:28 AM by Amor Asencio.       XR Chest 1 View [[id number]] Collected: 02/02/21 2149     Updated: 02/02/21 2151    Narrative:      CR Chest 1 Vw    INDICATION:   Change in respiratory status     COMPARISON:    Same date at 4:55 AM    FINDINGS:  Single portable AP view(s) of the chest.    Support lines and tubes are unchanged.    Prominent patchy airspace disease is demonstrated bilaterally. This shows modest improvement when compared to the prior study of the same date. No pneumothorax. No obvious pleural fluid.      Impression:      Prominent bilateral patchy airspace disease which shows modest improvement when compared to the prior study of the same date.    Signer Name: Martin Arteaga MD   Signed: 2/2/2021 9:49 PM   Workstation Name: [email or web address]    [facility]            Results: Reviewed.  I reviewed the patient's new laboratory and imaging results.  I independently reviewed the patient's new images.    Medications: Reviewed.    Assessment/Plan   A / P     Ms. Gunter is a 75yo F with a history of COPD, DM, hypothyroidism and hypertension who presented to [facility] on 1/8/21 with shortness of air and weakness after testing positive for COVID-19 on 1/4/21. She was exposed to COVID through her son. She was placed on Remdesivir, Decadron and Lovenox and given empiric antibiotics. She was transferred to the ICU on 1/10/21 for worsening hypoxia and was intubated. On 1/18/21, sedation was weaned but she then developed disconjugate gaze. She underwent imaging of the head which was negative for stroke and bleeding. She was started on Precedex but this was discontinued when she developed severe hypotension. On 1/20/21, she developed a fever. C. Diff was negative. Sputum culture showed Klebsiella and Stenotrophomonas. She was then started on  Cefepime and Vancomycin by ID and Micafungin for yeast dermatitis. A left sided chest tube was placed on 1/20/21 for a left pneumothorax. She developed a spontaneous pneumothorax on the right on 1/25/21 and a chest tube was placed emergently. She underwent a trach/PEG on 1/27/21. Neurology was consulted on 1/29/21 for persistent encephalopathy. EEG was performed and showed generalized slowing but no seizure activity.     Despite antibiotic therapy, she has continued to have a fever. Seroquel has been discontinued as of 1/31 in case of drug fever. She has remained on a Dilaudid drip. The right chest tube was removed on 2/1/21.       Nutrition:   NPO Diet  Advance Directives:   Code Status and Medical Interventions:   Ordered at: 01/08/21 1094     Level Of Support Discussed With:    Patient     Code Status:    CPR     Medical Interventions (Level of Support Prior to Arrest):    Full       Active Hospital Problems    Diagnosis   • **Acute respiratory failure with hypoxia, status post tracheostomy on 1/27/2021   • Metabolic encephalopathy, likely multifactorial   • Pneumothorax, bilateral   • COVID-19 virus detected   • Disease of thyroid gland   • Diabetes mellitus (CMS/HCC)   • Hypertension       Assessment / Plan:    Continue mechanical ventilation. PST as tolerated.   Restart Fortaz for possible trach site infection per General Surgery recommendations.      Continue left chest tube as there was 180cc in the past 24 hours.   Change back to seroquel.  Start scheduled Oxycodone. Wean off Dilaudid.   Keep tube feeds at 1/2 goal rate until bowel movement.   Change back to SSI  Continue low-dose midodrine.  Restart bowel regimen.   Replace magnesium.   AM labs and CXR    High level of risk due to:  severe exacerbation of chronic illness and illness with threat to life or bodily function.    Plan of care and goals reviewed during interdisciplinary rounds.  I discussed the patient's findings and my recommendations with  nursing staff        Aubrie Vincent, DO    Intensive Care Medicine and Pulmonary Medicine

## 2021-02-03 NOTE — PLAN OF CARE
Goal Outcome Evaluation:  Plan of Care Reviewed With: patient  Progress: no change  Outcome Summary: At the beginning of the shift patient continued to remain very restless and agitated. Pt asynchronous with vent., resp. rate in 50s, peak pressures 50s. Despite increasing Dilaudid gtt, ativan and versed given with minimal effect. Stat CXR obtained and lasix given. Tylenol given for fever. Tmax 102.1. TF held most of the night d/t high residuals. TF restarted at 0400.

## 2021-02-04 NOTE — PROGRESS NOTES
Multidisciplinary Rounds    Time: 20min  Patient Name: Mei Gunter  Date of Encounter: 02/04/21 09:15 EST  MRN: [id number]  Admission date: 1/8/2021      Reason for visit: MDR. RD to continue to follow per protocol.     Additional information obtained during MDR: Pt had a bowel movement overnight, EN rate increased to 50 ml/hr. Having residuals in the 200-300's. Pt receiving senokot, colace, miralax, and PRN dulcolax. Planning to keep EN rate at 50 ml/hr today until pt having consistent bowel movements.       Per I&O over the past 24 hrs:  1 bowel movements    Current diet: NPO Diet    EN: Peptamen AF at 80 ml/hr and free water at 50 ml/hr via PEG    Intervention:  Follow treatment plan  Care plan reviewed    Follow up:   Per protocol      Sherry Bennett MS RD/[facility]  09:25 EST

## 2021-02-04 NOTE — PROGRESS NOTES
INFECTIOUS DISEASE CONSULT/INITIAL HOSPITAL VISIT    Mei Gunter  1945  [id number]    Date of Consult: 2/4/2021    Admission Date: 1/8/2021      Requesting Provider: Dmitry Hernandez MD  Evaluating Physician: Harry Tran III, MD    Reason for Consultation: COVID-19 acute respiratory failure     History of present illness:    Patient is a 76 y.o. female with h/o COPD, T2DM, hypothyroidism, and HTN who presented to [facility] ED on 1/8/21 with increasing shortness of breath, weakness, fever, and dark black diarrhea.  The patient had shortness of breath and cough that started about 10 days ago with fever about 5 days ago of greater than 100F.  She tested positive for COVID-19 on or about 1/4/21 and caught COVID-19 from her son.  She was placed on Keflex by her PCP for possible bronchitis.  She also had nausea, vomiting, diarrhea and was waiting for Cdiff toxin test result.  She was having up to 5 to 10 stools a day and towards the latter part of the diarrhea, it had become sticky and slimy.  She denies dysuria or rashes.  Work up showed Tmax 100.8, worsening hypoxia now requiring 70% FiO2 45L O2 by high flow oxygen, A1C 6.8, lactic acid 1.2, D-dimer 1.8-->1.52, WBC 5700 with 86% neutrophils, Ferritin 334-->334, CRP 19.1-->31.75, -->520, proBNP 492, PCT 0.13, , and creatinine 1.02. A respiratory panel PCR is positive for COVID-19.  Blood cultures are pending. A CXR shows diffuse multifocal airspace disease.  She is currently on Remdesivir, Dexamethasone, and Lovenox.  ID was asked to evaluate and manage her antimicrobial therapy.     1/10/2021 patient in transfer the intensive care unit because of worsening hypoxia is currently intubated and sedated review of systems are unobtainable  1/11/21; patient is critically ill on pressor, vent, sedated fio2 45%; ros unobtainable  1/12/21; no events overnight; off of pressors, on vent; on tube feeds; no fever, ros unobtainable  1/13/21; on increasing fio2, sedation,  on vent, on pressors; started on zosyn.  On intermittent paralytics    1/14/21; patient is critically ill on vent peep 14, low dose pressors, sedated ros unobtainable    1/15/2021 patient is on low-dose Levophed likely for decreased vascular tone from sedation; patient is not requiring paralytics; FiO2 is down to 40%; patient is afebrile and hemodynamically stable with the exception of low-dose hypotension.  Review of systems are unobtainable    1/18/2021 patient on FiO2 of 40% propofol turned off patient remains on fentanyl Versed opens eyes to noxious stimulation afebrile normotensive  1/19/21; patient remains on vent, peep at 8, tolerating tube feeds; sedated ros unobtainable; afebrile, hemodynamically stable.  1/20/21; has fever, elevation of wbc, good response to bumex yesterday; on fio2 50%; started on levophed drip for hypotension. Remains critically ill. Had left sided pneumothorax treated with chest tube (pigtail drain)    1/21/2021; having low-grade temperatures remains intubated sedated on fentanyl drip given Ativan as needed for agitation restlessness; review of systems are unobtainable patient remains critically ill    1/22/21; no events overnight; afebrile, patient now on versed, fentanyl, remains intubated on fio2 40%; ros unobtainable    1/23/2021 patient is critically ill afebrile hemodynamically stable remains on FiO2 sedation is difficult patient is receiving as needed Ativan patient has been on Bumex drip review of systems are unobtainable    1/24/21; patient remains critically ill; on vent, on dilaudid drip for sedation  abx changed to ceftazidime last night;   Has low grade fevers on fio2 40%.  Ros unobtainable    1/25/2021 patient now has right-sided pneumothorax had chest tube placed today patient sedated on ventilator is hemodynamically stable has low-grade temperatures review of systems are unobtainable    1/26/21; no events overnight; remains critically ill remains on vent, sedation, ros  unobtainable    1/27/21; patient remains critically ill; on vent; returned from the OR for trach and peg today; has been diuresed today patient sedated on FiO2 40% no distress has been off of airborne precautions now    1/28/2021 patient remains on Dilaudid drip plan is to decrease this sedation patient is on low-dose of Levophed.  Patient is confused not following commands patient remains on vent receiving blood products today    1/29/21 patient is off of Dilaudid drip is resting quietly on ventilator patient opens eyes to voice but cannot follow commands    2/1/21; patient has fever now; is on fio2 50% resting quietly; on dilaudid drip.    2/2/21; continues to have fever, otherwise hemodynamically stable; seroquel held; on vent;    2/3/2021; has fevers has been started on Zyprexa is on Dilaudid drip remains intubated patient is sedated    2/4/2021; patient remains on ventilator been having fevers is currently hemodynamically stable    Review of systems unobtainable        Review of systems are unobtainable  Past Medical History:   Diagnosis Date   • Asthma    • COPD (chronic obstructive pulmonary disease) (CMS/HCC)    • Diabetes mellitus (CMS/[facility])    • Disease of thyroid gland    • Hypertension    • Restless leg syndrome    • Seasonal allergies        Past Surgical History:   Procedure Laterality Date   • BLADDER REPAIR     • CLAVICLE SURGERY     • HERNIA REPAIR     • HYSTERECTOMY     • RECTAL PROLAPSE REPAIR     • TRACHEOSTOMY AND PEG TUBE INSERTION N/A 1/27/2021    Procedure: TRACHEOSTOMY AND PERCUTANEOUS ENDOSCOPIC GASTROSTOMY TUBE INSERTION;  Surgeon: Eliud Dumont MD;  Location: [facility], [facility];  Service: General;  Laterality: N/A;   • TUBAL ABDOMINAL LIGATION         Family History   Problem Relation Age of Onset   • Diabetes Mother    • Asthma Mother    • Diabetes Father    • COPD Father        Social History     Socioeconomic History   • Marital status:      Spouse name: Not on file   • Number of  children: Not on file   • Years of education: Not on file   • Highest education level: Not on file   Tobacco Use   • Smoking status: Never Smoker   • Smokeless tobacco: Never Used   Substance and Sexual Activity   • Alcohol use: Never     Frequency: Never   • Drug use: Never   • Sexual activity: Never       Allergies   Allergen Reactions   • Actonel [Risedronate] Myalgia   • Hydrocodone Nausea Only   • Levaquin [Levofloxacin] Myalgia   • Other Myalgia     Ebista: legs hurt   • Stadol [Butorphanol] Other (See Comments)     Lowers blood pressure           Medication:    Current Facility-Administered Medications:   •  [DISCONTINUED] acetaminophen (TYLENOL) tablet 650 mg, 650 mg, Oral, Q4H PRN, 650 mg at 01/08/21 2142 **OR** acetaminophen (TYLENOL) 160 MG/5ML solution 650 mg, 650 mg, Per G Tube, Q4H PRN, 650 mg at 02/02/21 2132 **OR** acetaminophen (TYLENOL) suppository 650 mg, 650 mg, Rectal, Q4H PRN, Inez Alonzo, PharmD, 650 mg at 02/03/21 0131  •  albuterol sulfate HFA (PROVENTIL HFA;VENTOLIN HFA;PROAIR HFA) inhaler 4 puff, 4 puff, Inhalation, Q6H - RT, Eliud Dumont MD, 4 puff at 02/04/21 1213  •  ALPRAZolam (XANAX) tablet 0.5 mg, 0.5 mg, Oral, TID PRN, Elo Todd MD, 0.5 mg at 02/04/21 1007  •  artificial tears ophthalmic ointment, , Both Eyes, Q1H PRN, Eliud Dumont MD, Given at 01/23/21 0319  •  bisacodyl (DULCOLAX) suppository 10 mg, 10 mg, Rectal, Daily PRN, Eliud Dumont MD, 10 mg at 02/03/21 1658  •  cefTAZidime (FORTAZ) 2 g/100 mL 0.9% NS IVPB (mpb), 2 g, Intravenous, Q8H, Carlton, Aubrie TORRES DO, 2 g at 02/04/21 1349  •  chlorhexidine (PERIDEX) 0.12 % solution 15 mL, 15 mL, Mouth/Throat, Q12H, Eliud Dumont MD, 15 mL at 02/04/21 0824  •  dextrose (D50W) 25 g/ 50mL Intravenous Solution 25 g, 25 g, Intravenous, Q15 Min PRN, Eliud Dumont MD, 25 g at 02/01/21 1232  •  sennosides (SENOKOT) 8.8 MG/5ML syrup 10 mL, 10 mL, Per G Tube, BID, 10 mL at 02/04/21 0824 **AND** docusate  sodium (COLACE) liquid 100 mg, 100 mg, Per G Tube, BID, Inez Alonzo, PharmD, 100 mg at 02/04/21 0824  •  enoxaparin (LOVENOX) syringe 110 mg, 1 mg/kg, Subcutaneous, Q12H, Vicki Samaniego, PharmD, 110 mg at 02/04/21 0824  •  HYDROcodone-acetaminophen (NORCO) 5-325 MG per tablet 1 tablet, 1 tablet, Oral, Q6H PRN, Elo Todd MD, 1 tablet at 02/04/21 0736  •  HYDROmorphone (DILAUDID) INFUSION (PCA 1 mg/mL syringe), 0.2-3 mg/hr, Intravenous, Titrated, Andrea Burgos MD, Last Rate: 1.4 mL/hr at 02/04/21 1012, New Syringe/Cartridge at 02/04/21 1048  •  influenza vac split quad (FLUZONE,FLUARIX,AFLURIA,FLULAVAL) injection 0.5 mL, 0.5 mL, Intramuscular, During Hospitalization, Eliud Dumont MD  •  insulin detemir (LEVEMIR) injection 10 Units, 10 Units, Subcutaneous, Q12H, Sandie West APRN, 10 Units at 02/04/21 0835  •  insulin regular (humuLIN R,novoLIN R) injection 0-24 Units, 0-24 Units, Subcutaneous, Q6H, Eliud Dumont MD, 4 Units at 02/02/21 1808  •  Levothyroxine Sodium injection 75 mcg, 75 mcg, Intravenous, Daily, Eliud Dumont MD, 75 mcg at 02/04/21 1157  •  LORazepam (ATIVAN) injection 0.5 mg, 0.5 mg, Intravenous, Q4H PRN, Aubrie Vincent, DO, 0.5 mg at 02/04/21 1157  •  Magnesium Sulfate 2 gram Bolus, followed by 8 gram infusion (total Mg dose 10 grams)- Mg less than or equal to 1mg/dL, 2 g, Intravenous, PRN **OR** Magnesium Sulfate 2 gram / 50mL Infusion (GIVE X 3 BAGS TO EQUAL 6GM TOTAL DOSE) - Mg 1.1 - 1.5 mg/dl, 2 g, Intravenous, PRN **OR** Magnesium Sulfate 4 gram infusion- Mg 1.6-1.9 mg/dL, 4 g, Intravenous, PRN, Eliud Dumont MD, Last Rate: 25 mL/hr at 02/03/21 0955, 4 g at 02/03/21 0955  •  midodrine (PROAMATINE) tablet 5 mg, 5 mg, Per G Tube, TID AC, Sandie West, APRN, 5 mg at 02/04/21 1158  •  nystatin (MYCOSTATIN) powder, , Topical, Q12H, Eliud Dumont MD, Given at 02/04/21 0825  •  ondansetron (ZOFRAN) tablet 4 mg, 4 mg, Per G Tube, Q6H PRN **OR**  ondansetron (ZOFRAN) injection 4 mg, 4 mg, Intravenous, Q6H PRN, Inez Alonzo, PharmD  •  oxyCODONE (ROXICODONE) immediate release tablet 10 mg, 10 mg, Per G Tube, Q6H, Inez Alonzo, PharmD, 10 mg at 02/04/21 1158  •  pantoprazole (PROTONIX) injection 40 mg, 40 mg, Intravenous, Q12H, Eliud Dumont MD, 40 mg at 02/04/21 0825  •  pneumococcal polysaccharide 23-valent (PNEUMOVAX-23) vaccine 0.5 mL, 0.5 mL, Intramuscular, During Hospitalization, Eliud Dumont MD  •  polyethylene glycol (MIRALAX) packet 17 g, 17 g, Per G Tube, Daily, Inez Alonzo, PharmD, 17 g at 02/04/21 0824  •  potassium chloride (KLOR-CON) packet 40 mEq, 40 mEq, Per PEG Tube, PRN, Inez Alonzo, PharmD  •  potassium phosphate 45 mmol in sodium chloride 0.9 % 250 mL infusion, 45 mmol, Intravenous, PRN **OR** potassium phosphate 30 mmol in sodium chloride 0.9 % 100 mL infusion, 30 mmol, Intravenous, PRN **OR** potassium phosphate 15 mmol in sodium chloride 0.9 % 100 mL infusion, 15 mmol, Intravenous, PRN **OR** sodium phosphates 45 mmol in sodium chloride 0.9 % 250 mL IVPB, 45 mmol, Intravenous, PRN **OR** sodium phosphates 30 mmol in sodium chloride 0.9 % 100 mL IVPB, 30 mmol, Intravenous, PRN, Last Rate: 25 mL/hr at 01/13/21 1254, 30 mmol at 01/13/21 1254 **OR** sodium phosphates 15 mmol in sodium chloride 0.9 % 100 mL IVPB, 15 mmol, Intravenous, PRN, Eliud Dumont MD, Last Rate: 50 mL/hr at 01/26/21 0916, 15 mmol at 01/26/21 0916  •  QUEtiapine (SEROquel) tablet 75 mg, 75 mg, Per G Tube, Q12H, Aubrie Vincent DO  •  rOPINIRole (REQUIP) tablet 4 mg, 4 mg, Per G Tube, Nightly, Andrea Burgos MD, 4 mg at 02/03/21 2200  •  sodium chloride 0.9 % flush 10 mL, 10 mL, Intravenous, PRN, Eliud Dumont MD, 10 mL at 02/04/21 0825    Antibiotics:  Anti-Infectives (From admission, onward)    Ordered     Dose/Rate Route Frequency Start Stop    02/03/21 0918  cefTAZidime (FORTAZ) 2 g/100 mL 0.9% NS IVPB (mpb)     Inez Alonzo, PharmD reviewed  the order on 02/03/21 1151.   Ordering Provider: Aubrie Vincent V., DO    2 g  over 30 Minutes Intravenous Every 8 Hours Scheduled 02/03/21 1015 02/10/21 1359    01/27/21 1439  valACYclovir (VALTREX) tablet 1,000 mg     Ordering Provider: Harry Tran MD    1,000 mg Per G Tube Every 12 Hours Scheduled 01/27/21 1441 01/27/21 2042    01/22/21 1022  micafungin 100 mg/100 mL 0.9% NS IVPB (mbp)     Lee Moreno, PharmD reviewed the order on 01/29/21 1427.   Ordering Provider: Harry Tran MD    100 mg  over 60 Minutes Intravenous Every 24 Hours 01/22/21 1115 02/01/21 1219    01/20/21 1457  cefepime (MAXIPIME) 2 g/100 mL 0.9% NS (mbp)     Ordering Provider: Harry Tran MD    2 g  200 mL/hr over 30 Minutes Intravenous Once 01/20/21 1545 01/20/21 1630    01/14/21 1345  levoFLOXacin (LEVAQUIN) 500 mg/100 mL D5W (premix) (LEVAQUIN) 500 mg     Cornelia Tompkins, PharmD reviewed the order on 01/15/21 0713.   Ordering Provider: Harry Tran MD    500 mg Intravenous Every 24 Hours 01/14/21 1500 01/20/21 1412    01/13/21 0917  piperacillin-tazobactam (ZOSYN) 3.375 g in iso-osmotic dextrose 50 ml (premix)     Ordering Provider: Adelso Bach MD    3.375 g  over 30 Minutes Intravenous Once 01/13/21 1015 01/13/21 1052    01/09/21 1402  remdesivir 100 mg in sodium chloride 0.9 % 250 mL IVPB (powder vial)     Cornelia Tompkins, PharmD reviewed the order on 01/15/21 1335.   Ordering Provider: Larry Hawkins PA    100 mg  over 60 Minutes Intravenous Every 24 Hours 01/10/21 1200 01/17/21 1229    01/08/21 1801  remdesivir 100 mg in sodium chloride 0.9 % 250 mL IVPB (powder vial)     Ordering Provider: Mena Ribeiro APRN    100 mg  over 60 Minutes Intravenous Every 24 Hours 01/09/21 1800 01/09/21 1912    01/08/21 1801  remdesivir 200 mg in sodium chloride 0.9 % 250 mL IVPB (powder vial)     Ordering Provider: Mena Ribeiro APRN    200 mg  over 60 Minutes Intravenous Every 24 Hours 01/08/21 2203  21 2242            Review of Systems:  See HPI      Physical Exam:   Vital Signs  Temp (24hrs), Av.9 °F (37.2 °C), Min:98.2 °F (36.8 °C), Max:100 °F (37.8 °C)    Temp  Min: 98.2 °F (36.8 °C)  Max: 100 °F (37.8 °C)  BP  Min: 93/54  Max: 145/66  Pulse  Min: 86  Max: 111  Resp  Min: 34  Max: 45  SpO2  Min: 88 %  Max: 100 %    GENERAL: Resting quietly on vent  fio2 50%   HEENT: Normocephalic, atraumatic.  No ext oral lesions  Tracheostomy present  HEART: Normal rate by monitor S1-S2  LUNGS: symmetrical inspiration; on vent;   ABDOMEN: nondistended.  Nontender    Skin no joint deformity    SKIN: No rashes on exposed skin    Laboratory Data    Results from last 7 days   Lab Units 21  0330 21  0340 21  2109   WBC 10*3/mm3 7.57 8.82 11.98*   HEMOGLOBIN g/dL 7.4* 7.9* 8.8*   HEMATOCRIT % 24.5* 26.2* 29.3*   PLATELETS 10*3/mm3 346 354 412     Results from last 7 days   Lab Units 21  0330   SODIUM mmol/L 130*   POTASSIUM mmol/L 4.4   CHLORIDE mmol/L 97*   CO2 mmol/L 23.0   BUN mg/dL 20   CREATININE mg/dL 0.67   GLUCOSE mg/dL 118*   CALCIUM mg/dL 8.5*     Results from last 7 days   Lab Units 21  0330   ALK PHOS U/L 64   BILIRUBIN mg/dL 0.3   ALT (SGPT) U/L 13   AST (SGOT) U/L 23         Results from last 7 days   Lab Units 21  0330   CRP mg/dL 29.99*         Results from last 7 days   Lab Units 21  0330 21  0340 21  0440   CK TOTAL U/L 38 61 41         Estimated Creatinine Clearance: 74.7 mL/min (by C-G formula based on SCr of 0.67 mg/dL).      Microbiology:  Microbiology Results (last 10 days)     Procedure Component Value - Date/Time    Respiratory Culture - Aspirate, Bronchus [[id number]]  (Abnormal) Collected: 21 1156    Lab Status: Preliminary result Specimen: Aspirate from Bronchus Updated: 21 1106     Respiratory Culture Moderate growth (3+) Gram Negative Bacilli     Gram Stain Few (2+) WBCs per low power field      Rare (1+) Epithelial cells per  low power field      Rare (1+) Gram negative bacilli    Blood Culture - Blood, Arm, Left [[id number]] Collected: 02/03/21 1049    Lab Status: Preliminary result Specimen: Blood from Arm, Left Updated: 02/04/21 1131     Blood Culture No growth at 24 hours    Herpes Simplex Virus Culture - Swab, Oropharynx [[id number]] Collected: 01/27/21 0004    Lab Status: Final result Specimen: Swab from Oropharynx Updated: 01/29/21 1908     HSV Culture Without Typing Negative    Narrative:      Performed at:  01 - [address]  [id number]  : Steve Cooper PhD, Phone:  [phone]                Radiology:  Imaging Results (Last 72 Hours)     Procedure Component Value Units Date/Time    XR Chest 1 View [[id number]] Collected: 02/04/21 1331     Updated: 02/04/21 1337    Narrative:      EXAMINATION: XR CHEST 1 VW-02/04/2021:     INDICATION: Left chest tube clamped; J96.01-Acute respiratory failure  with hypoxia; U07.1-COVID-19; J12.82-Pneumonia due to Coronavirus  disease 2019; J93.9-Pneumothorax, unspecified; J93.9-Pneumothorax,  unspecified.     COMPARISON: 02/04/2021.     FINDINGS: Left-sided pigtail catheter remains in place. No pneumothorax  is seen. PICC line remains in the mid SVC. Diffuse pulmonary  interstitial disease remains relatively extensive but stable. No new  chest disease is seen.       Impression:      Stable pulmonary interstitial disease. No evidence of  pneumothorax or new chest pathology is seen.     D:  02/04/2021  E:  02/04/2021              XR Chest 1 View [[id number]] Collected: 02/04/21 0821     Updated: 02/04/21 0825    Narrative:      EXAMINATION: XR CHEST 1 VW- 02/04/2021     INDICATION: J96.01-Acute respiratory failure with hypoxia;  U07.1-COVID-19; J12.82-Pneumonia due to Coronavirus disease 2019;  J93.9-Pneumothorax, unspecified; J93.9-Pneumothorax, unspecified     COMPARISON: 02/03/2021     FINDINGS: Left pigtail drain remains in place. Tracheostomy tube  and  PICC line are again noted. Heart shadow is upper limits of normal size.  Coarse bilateral pulmonary interstitial disease is greater on the right  than left, but improved from the prior study. No effusion or  pneumothorax is seen.       Impression:      Persistent but improving bilateral pulmonary interstitial  disease, compared to 02/03/2021 exam.       D:  02/04/2021  E:  02/04/2021       XR Chest 1 View [[id number]] Collected: 02/03/21 0827     Updated: 02/03/21 0831    Narrative:      EXAMINATION: XR CHEST 1 VW-      INDICATION: resp failure; J96.01-Acute respiratory failure with hypoxia;  U07.1-COVID-19; J12.82-Pneumonia due to Coronavirus disease 2019;  J93.9-Pneumothorax, unspecified; J93.9-Pneumothorax, unspecified      COMPARISON: One day prior     FINDINGS: Support hardware projects unchanged. Unchanged bilateral dense  pulmonary opacifications. No distinct pneumothorax. Heart and  mediastinal contours remain obscured.       Impression:      No significant interval change.     This report was finalized on 2/3/2021 8:28 AM by Amor Asencio.       XR Chest 1 View [[id number]] Collected: 02/02/21 2149     Updated: 02/02/21 2151    Narrative:      CR Chest 1 Vw    INDICATION:   Change in respiratory status     COMPARISON:    Same date at 4:55 AM    FINDINGS:  Single portable AP view(s) of the chest.    Support lines and tubes are unchanged.    Prominent patchy airspace disease is demonstrated bilaterally. This shows modest improvement when compared to the prior study of the same date. No pneumothorax. No obvious pleural fluid.      Impression:      Prominent bilateral patchy airspace disease which shows modest improvement when compared to the prior study of the same date.    Signer Name: Martin Arteaga MD   Signed: 2/2/2021 9:49 PM   Workstation Name: [id number]    [facility]    XR Chest 1 View [[id number]] Collected: 02/02/21 0842     Updated: 02/02/21 1056    Narrative:       EXAMINATION: XR CHEST 1 VW-      INDICATION: J96.01-Acute respiratory failure with hypoxia;  U07.1-COVID-19; J12.82-Pneumonia due to Coronavirus disease 2019;  J93.9-Pneumothorax, unspecified.      COMPARISON: Chest x-ray 02/01/2021.     FINDINGS: Interval removal of right chest tube with a left chest tube  remain in place. No discrete pneumothorax with bilateral dense pulmonary  opacifications stable to slightly increased from prior comparison along  with probable trace right and small left pleural effusion.           Impression:      Interval removal of right chest tube with a left chest tube  remain in place. No discrete pneumothorax with bilateral dense pulmonary  opacifications stable to slightly increased from prior comparison along  with probable trace right and small left pleural effusion.     D:  02/02/2021  E:  02/02/2021     This report was finalized on 2/2/2021 10:53 AM by Dr. Miguel Angel Hightower.       XR Chest 1 View [[id number]] Collected: 02/01/21 1249     Updated: 02/01/21 1642    Narrative:      EXAMINATION: XR CHEST 1 VW-02/01/2021:      INDICATION: Evaluate R lung; chest tube clamped; J96.01-Acute  respiratory failure with hypoxia; U07.1-COVID-19; J12.82-Pneumonia due  to Coronavirus disease 2019; J93.9-Pneumothorax, unspecified;  J93.9-Pneumothorax, unspecified.      COMPARISON: NONE.     FINDINGS: Portable chest reveals bilateral chest tubes in place. No  definite pneumothorax identified on the right. Increased markings seen  diffusely throughout the lung fields bilaterally. PICC line catheter is  stable. Tracheostomy tube in satisfactory position.       Impression:      No definite pneumothorax identified on the right. Chest is  stable.     D:  02/01/2021  E:  02/01/2021     This report was finalized on 2/1/2021 4:39 PM by Dr. Vita De Jesus MD.       XR Chest 1 View [[id number]] Collected: 02/01/21 0921     Updated: 02/01/21 1641    Narrative:      EXAMINATION: XR CHEST 1 VW- 02/01/2021      INDICATION: J96.01-Acute respiratory failure with hypoxia;  U07.1-COVID-19; J12.82-Pneumonia due to Coronavirus disease 2019;  J93.9-Pneumothorax, unspecified; J93.9-Pneumothorax, unspecified      COMPARISON: 01/31/2021     FINDINGS: Patchy airspace disease seen diffusely throughout the lung  fields bilaterally. Bilateral chest tubes in place. There is a  tracheostomy tube in satisfactory position above the frankie.  Degenerative changes seen within the spine. Small bilateral pleural  effusions.          Impression:      Patchy airspace disease seen stable throughout the lung  fields bilaterally. Bilateral chest tubes in place with no pneumothorax.  Tracheostomy tube in satisfactory position.     D:  02/01/2021  E:  02/01/2021     This report was finalized on 2/1/2021 4:38 PM by Dr. Vita De Jesus MD.           Estimated Creatinine Clearance: 74.7 mL/min (by C-G formula based on SCr of 0.67 mg/dL).    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    Patient given multiple course of antibiotics occluding Zosyn.  Levaquin.  Ceftazidime.  And vancomycin.      Patient cannot be weaned off ventilator will likely have  recurrent pneumonia.    Patient is up in the hospital 26 days and in the ICU 25 days    Very poor overall prognosis    Check blood cultures x2 sets, urine culture, sputum culture    Observe of antibiotics for the time being given patient is hemodynamically stable    Patient is critically ill; follow-up new cultures sputum culture may reveal stenotrophomonas again;    Guarded prognosis family wishes for full support at this time    I have discussed the case with Dr. Carlton Tran MD  2/4/2021  14:54 EST

## 2021-02-04 NOTE — PLAN OF CARE
Goal Outcome Evaluation:  Plan of Care Reviewed With: patient  Progress: no change  Outcome Summary: VSS. Tmax 100. Dilaudid gtt weaned to 1.2mg/hr. Ativan and xanax given prn for increased restlessness. Adequate UOP. TF increased to 50ml/hr, residuals 225 & 350. Pt did have x1 moderate BM.

## 2021-02-04 NOTE — PROGRESS NOTES
Continued Stay Note  [facility]     Patient Name: Mei Gunter  MRN: [id number]  Today's Date: 2/4/2021    Admit Date: 1/8/2021    Discharge Plan     Row Name 02/04/21 1039       Plan    Plan  [facility]    Plan Comments  Patient still on vent.  Discussed in MDR, patient ready for LTACH.  Spoke with Lily with [facility] and they will have a bed for patient and she will initiate pre-cert today.  Have requested ALS ambulance for tomorrow afternoon, waiting for call to see when scheduled.  Will update daughter once she arrives.  CM will continue to follow.        Discharge Codes    No documentation.       Expected Discharge Date and Time     Expected Discharge Date Expected Discharge Time    Feb 8, 2021       1105  [facility] ALS ambulance scheduled for Sunday at 0900 trip # [id number]  RN to call report to [phone] and to fax discharge summary to [phone]      Kay Gutierrez RN

## 2021-02-04 NOTE — PLAN OF CARE
Problem: Fall Injury Risk  Goal: Absence of Fall and Fall-Related Injury  Intervention: Promote Injury-Free Environment  Recent Flowsheet Documentation  Taken 2/3/2021 1600 by Tiara Patel RN  Safety Promotion/Fall Prevention: safety round/check completed   Goal Outcome Evaluation:        Outcome Summary: Pt remains on ventialtor at 50% FIO2 . T-max was 100.8 . Dr. Sheffield at bedside to eval trach site , no orders received. Trach site is still angry red and draining serous fluid. Right chest tube output 20ml. Pt was started  back on a bowel regimen and given a prn suppository but no results as of yet. Pt continues to have large volume tube feeding residuals from peg. Was unable to advance tube feeding rate. Lasix onetime dose given with minimal uop. Roxicodone scheduled to aid in weaning pt off Dilaudid. VSS. will continue to monitor,.

## 2021-02-04 NOTE — PROCEDURES
Left Chest Tube Removal    The procedure was performed at patient bedside. The patient is sedated on a Dilaudid drip and remains on mechanical ventilation. The retention suture was removed and the chest tube discontinued with Duoderm occlusive dressing mmediately applied. Respiratory status remained unchanged through the procedure and the patient tolerated the procedure with no immediate complications.    BEVERLY Gregory, [facility] Services[facility]-BC  Pulmonary & Critical Care Medicine

## 2021-02-04 NOTE — PROGRESS NOTES
INTENSIVIST   PROGRESS NOTE     Hospital:  LOS: 27 days     Ms. Mei Gunter, 76 y.o. female is followed for a Chief Complaint of: Respiratory Failure      Subjective   S     Interval History:  Afebrile overnight. Repeat sputum culture with gram negative rods.        The patient's relevant past medical, surgical and social history were reviewed and updated in Epic as appropriate.      ROS: Unable to obtain secondary to mental status and mechanical ventilation.     Objective   O     Vitals:  Temp  Min: 98.2 °F (36.8 °C)  Max: 100 °F (37.8 °C)  BP  Min: 93/54  Max: 145/66  Pulse  Min: 82  Max: 111  Resp  Min: 34  Max: 45  SpO2  Min: 89 %  Max: 100 % No data recorded    Intake/Ouptut 24 hrs (7:00AM - 6:59 AM)  Intake & Output (last 3 days)       02/01 0701 - 02/02 0700 02/02 0701 - 02/03 0700 02/03 0701 - 02/04 0700 02/04 0701 - 02/05 0700    I.V. (mL/kg) 261.5 (2.3) 248.4 (2.2) 365.6 (3.3) 36.5 (0.3)    Other 785 672 878 150    NG/ 811 802 482    IV Piggyback 350  200 88.2    Total Intake(mL/kg) 2277.5 (20.3) 1731.4 (15.5) 2245.6 (20.1) 756.7 (6.8)    Urine (mL/kg/hr) 700 (0.3) 1850 (0.7) 1800 (0.7) 150 (0.2)    Stool   0     Chest Tube 180 180 20 30    Total Output 880 2030 1820 180    Net +1397.5 -298.6 +425.6 +576.7            Urine Unmeasured Occurrence 1 x 2 x 2 x     Stool Unmeasured Occurrence   1 x           Medications (drips):  HYDROmorphone 1 mg/ml, Last Rate: 1.2 mg/hr (02/04/21 0426)        Mechanical Ventilator Settings:          Resp Rate (Set): 26     FiO2 (%): 50 %  PEEP/CPAP (cm H2O): 5 cm H20    Minute Ventilation (L/min) (Obs): 14.6 L/min  Resp Rate (Observed) Vent: 42  I:E Ratio (Set): 1:0.83  I:E Ratio (Obs): 2.40:1    PIP Observed (cm H2O): 38 cm H2O  Plateau Pressure (cm H2O): 36 cm H2O    Physical Examination  Telemetry:  Normal sinus rhythm.    Constitutional:  No acute distress.  Trach in place on mechanical ventilation.    Eyes: No scleral icterus.   PERRL, EOM intact.    Neck:   Supple, FROM   Cardiovascular: Normal rate, regular and rhythm. Normal heart sounds.  No murmurs, gallop or rub.   Respiratory: No respiratory distress. Normal respiratory effort.  Diminished bilaterally. No wheezing.    Left chest tube in place with serosanguinous drainage.    Abdominal:  Soft. No masses. Nontender. No distension. No HSM.   Extremities: No digital cyanosis. No clubbing.  No peripheral edema.   Skin: No rashes, lesions or ulcers   Neurological:   Moves eyes and extremities.   Does not follow commands.             Results from last 7 days   Lab Units 02/04/21 0330 02/03/21 0340 02/02/21 2109   WBC 10*3/mm3 7.57 8.82 11.98*   HEMOGLOBIN g/dL 7.4* 7.9* 8.8*   MCV fL 92.8 92.3 92.1   PLATELETS 10*3/mm3 346 354 412     Results from last 7 days   Lab Units 02/04/21 0330 02/03/21 0340 02/02/21 2109 02/02/21 0440   SODIUM mmol/L 130* 138 135* 135*   POTASSIUM mmol/L 4.4 4.7 4.7 4.4   CO2 mmol/L 23.0 24.0 23.0 23.0   CREATININE mg/dL 0.67 0.59 0.60 0.58   GLUCOSE mg/dL 118* 122* 104* 124*   MAGNESIUM mg/dL 3.0* 1.9 2.0 2.0   PHOSPHORUS mg/dL 2.8 2.2*  --  2.9     Estimated Creatinine Clearance: 74.7 mL/min (by C-G formula based on SCr of 0.67 mg/dL).  Results from last 7 days   Lab Units 02/04/21 0330 02/03/21 0340 02/02/21  0440   ALK PHOS U/L 64 75 62   BILIRUBIN mg/dL 0.3 0.3 0.3   ALT (SGPT) U/L 13 13 12   AST (SGOT) U/L 23 17 16             Images:  Imaging Results (Last 24 Hours)     Procedure Component Value Units Date/Time    XR Chest 1 View [[id number]] Collected: 02/04/21 0821     Updated: 02/04/21 0825    Narrative:      EXAMINATION: XR CHEST 1 VW- 02/04/2021     INDICATION: J96.01-Acute respiratory failure with hypoxia;  U07.1-COVID-19; J12.82-Pneumonia due to Coronavirus disease 2019;  J93.9-Pneumothorax, unspecified; J93.9-Pneumothorax, unspecified     COMPARISON: 02/03/2021     FINDINGS: Left pigtail drain remains in place. Tracheostomy tube and  PICC line are again noted. Heart shadow  is upper limits of normal size.  Coarse bilateral pulmonary interstitial disease is greater on the right  than left, but improved from the prior study. No effusion or  pneumothorax is seen.       Impression:      Persistent but improving bilateral pulmonary interstitial  disease, compared to 02/03/2021 exam.       D:  02/04/2021  E:  02/04/2021               Results: Reviewed.  I reviewed the patient's new laboratory and imaging results.  I independently reviewed the patient's new images.    Medications: Reviewed.    Assessment/Plan   A / P     Ms. Gunter is a 75yo F with a history of COPD, DM, hypothyroidism and hypertension who presented to [facility] on 1/8/21 with shortness of air and weakness after testing positive for COVID-19 on 1/4/21. She was exposed to COVID through her son. She was placed on Remdesivir, Decadron and Lovenox and given empiric antibiotics. She was transferred to the ICU on 1/10/21 for worsening hypoxia and was intubated. On 1/18/21, sedation was weaned but she then developed disconjugate gaze. She underwent imaging of the head which was negative for stroke and bleeding. She was started on Precedex but this was discontinued when she developed severe hypotension. On 1/20/21, she developed a fever. C. Diff was negative. Sputum culture showed Klebsiella and Stenotrophomonas. She was then started on Cefepime and Vancomycin by ID and Micafungin for yeast dermatitis. A left sided chest tube was placed on 1/20/21 for a left pneumothorax. She developed a spontaneous pneumothorax on the right on 1/25/21 and a chest tube was placed emergently. She underwent a trach/PEG on 1/27/21. Neurology was consulted on 1/29/21 for persistent encephalopathy. EEG was performed and showed generalized slowing but no seizure activity.     Despite antibiotic therapy, she has continued to have a fever. Seroquel has been discontinued as of 1/31 in case of drug fever. She has remained on a Dilaudid drip. The right chest tube was  removed on 2/1/21.       Nutrition:   NPO Diet  Advance Directives:   Code Status and Medical Interventions:   Ordered at: 01/08/21 8567     Level Of Support Discussed With:    Patient     Code Status:    CPR     Medical Interventions (Level of Support Prior to Arrest):    Full       Active Hospital Problems    Diagnosis   • **Acute respiratory failure with hypoxia, status post tracheostomy on 1/27/2021   • Metabolic encephalopathy, likely multifactorial   • Pneumothorax, bilateral   • COVID-19 virus detected   • Disease of thyroid gland   • Diabetes mellitus (CMS/HCC)   • Hypertension       Assessment / Plan:    Continue mechanical ventilation. PST as tolerated.   Continue antibiotics. Follow up culture results.       Left chest tube clamped. Plan to pull this afternoon if CXR negative for pneumothorax.   Increase Seroquel.   Wean off Dilaudid. Will need to switch to Fentanyl if Dilaudid cannot be weaned.   Continue tube feeds. Slowly advance as bowel movements improve.    Continue current insulin regimen.   Continue low-dose midodrine.  Continue bowel regimen.   Replace magnesium.   AM labs and CXR    High level of risk due to:  severe exacerbation of chronic illness and illness with threat to life or bodily function.    Plan of care and goals reviewed during interdisciplinary rounds.  I discussed the patient's findings and my recommendations with nursing staff        Aubrie Vincent, DO    Intensive Care Medicine and Pulmonary Medicine

## 2021-02-05 NOTE — PLAN OF CARE
Goal Outcome Evaluation:     Progress: no change  Outcome Summary: Pt continues on vent, restless and agitated with stimulation. RR still 30-50 despite scheduled and prn meds. Scheduled ativan added today and seroquel dose increased. Pt did seem to rest more today, but still has long periods of agitation. Large amount subglottal secretions requiring frequent suctioning. Afebrile throughout shift. Residuals improving, last checked was 190ml. TF increased to 70ml/hr with goal of 80ml/hr, to be increased at 0100 to goal, FW decreased d/t drop in sodium on AM labs. No change in neuro status. Daughter updated via phone

## 2021-02-05 NOTE — PROGRESS NOTES
Continued Stay Note  [facility]     Patient Name: Mei Gunter  MRN: [id number]  Today's Date: 2/5/2021    Admit Date: 1/8/2021    Discharge Plan     Row Name 02/05/21 1059       Plan    Plan  [facility]    Plan Comments  Patient still on vent.  Spoke with daughter yesterday and updated on plan to [facility] on Sunday.  Spoke with Lily with [facility], Insurance pre-cert is still pending.  Lily will be  for [facility] this weekend and can be reached at [phone].  [facility] scheduled for 0900 Sunday 2/7/21. RN to call report to [phone] and fax discharge summary to [phone].  DEJON NP notifed yesterday pf pending discharge.  CM will continue to follow.        Discharge Codes    No documentation.       Expected Discharge Date and Time     Expected Discharge Date Expected Discharge Time    Feb 8, 2021       1130 Received call from Lily with [facility] and pre-cert has been approved.  Notified Anabela ARECHIGA.      Kay Gutierrez RN

## 2021-02-05 NOTE — PLAN OF CARE
Goal Outcome Evaluation:  Plan of Care Reviewed With: patient  Progress: no change  Outcome Summary: Pt remains restless and agitated, fentanyl gtt increased and prn medications administered around the clock. TF residuals ranging from 300-350 ml, rate unchanged. No change in neuro status. Tmax- 99.5.

## 2021-02-05 NOTE — PLAN OF CARE
Goal Outcome Evaluation:        Outcome Summary: Afebrile throughout shift. TF continues at 50ml/hr d/t high residuals (300-425ml). Pt had 1 moderate BM. PRN medications for pain and restlessness given without relief in restlessness/agitation. Dilaudid gtt changed to fentanyl for pending trasfer to [facility]. Neuro status unchanged.

## 2021-02-05 NOTE — PROGRESS NOTES
INTENSIVIST   PROGRESS NOTE     Hospital:  LOS: 28 days     Ms. Mei Gunter, 76 y.o. female is followed for a Chief Complaint of: Respiratory Failure      Subjective   S     Interval History:  Afebrile overnight. No improvement in mentation.        The patient's relevant past medical, surgical and social history were reviewed and updated in Epic as appropriate.      ROS: Unable to obtain secondary to mental status and mechanical ventilation.     Objective   O     Vitals:  Temp  Min: 97.2 °F (36.2 °C)  Max: 99.5 °F (37.5 °C)  BP  Min: 90/47  Max: 163/76  Pulse  Min: 83  Max: 114  Resp  Min: 32  Max: 45  SpO2  Min: 87 %  Max: 100 % No data recorded    Intake/Ouptut 24 hrs (7:00AM - 6:59 AM)  Intake & Output (last 3 days)       02/02 0701 - 02/03 0700 02/03 0701 - 02/04 0700 02/04 0701 - 02/05 0700 02/05 0701 - 02/06 0700    I.V. (mL/kg) 248.4 (2.2) 365.6 (3.3) 434.2 (3.9) 190 (1.7)    Other  251    NG/ 232 7778 446    IV Piggyback  200 288.2 100    Total Intake(mL/kg) 1731.4 (15.5) 2245.6 (20.1) 5016.4 (44.8) 987 (8.8)    Urine (mL/kg/hr) 1850 (0.7) 1800 (0.7) 600 (0.2) 100 (0.2)    Stool  0 0     Chest Tube 180 20 30     Total Output 2030 1820 630 100    Net -298.6 +425.6 +4386.4 +887            Urine Unmeasured Occurrence 2 x 2 x      Stool Unmeasured Occurrence  1 x 1 x           Medications (drips):  fentanyl 10 mcg/mL, Last Rate: 200 mcg/hr (02/05/21 0928)        Mechanical Ventilator Settings:          Resp Rate (Set): 26     FiO2 (%): 60 %  PEEP/CPAP (cm H2O): 5 cm H20    Minute Ventilation (L/min) (Obs): 12.3 L/min  Resp Rate (Observed) Vent: 40  I:E Ratio (Set): 1:0.82  I:E Ratio (Obs): 4.10:1    PIP Observed (cm H2O): 42 cm H2O  Plateau Pressure (cm H2O): 36 cm H2O    Physical Examination  Telemetry:  Normal sinus rhythm.    Constitutional:  No acute distress.  Trach in place on mechanical ventilation.    Eyes: No scleral icterus.   PERRL, EOM intact.    Neck:  Supple, FROM    Cardiovascular: Normal rate, regular and rhythm. Normal heart sounds.  No murmurs, gallop or rub.   Respiratory: No respiratory distress. Normal respiratory effort.  Diminished bilaterally. No wheezing.    Left chest tube in place with serosanguinous drainage.    Abdominal:  Soft. No masses. Nontender. No distension. No HSM.   Extremities: No digital cyanosis. No clubbing.  No peripheral edema.   Skin: No rashes, lesions or ulcers   Neurological:   Moves eyes and extremities.   Does not follow commands.             Results from last 7 days   Lab Units 02/05/21 0417 02/04/21 0330 02/03/21  0340   WBC 10*3/mm3 8.90 7.57 8.82   HEMOGLOBIN g/dL 7.3* 7.4* 7.9*   MCV fL 91.5 92.8 92.3   PLATELETS 10*3/mm3 336 346 354     Results from last 7 days   Lab Units 02/05/21 0417 02/04/21 0330 02/03/21  0340   SODIUM mmol/L 127* 130* 138   POTASSIUM mmol/L 4.9 4.4 4.7   CO2 mmol/L 23.0 23.0 24.0   CREATININE mg/dL 0.69 0.67 0.59   GLUCOSE mg/dL 171* 118* 122*   MAGNESIUM mg/dL 2.0 3.0* 1.9   PHOSPHORUS mg/dL 2.8 2.8 2.2*     Estimated Creatinine Clearance: 74.7 mL/min (by C-G formula based on SCr of 0.69 mg/dL).  Results from last 7 days   Lab Units 02/05/21 0417 02/04/21 0330 02/03/21  0340   ALK PHOS U/L 64 64 75   BILIRUBIN mg/dL 0.2 0.3 0.3   ALT (SGPT) U/L 10 13 13   AST (SGOT) U/L 14 23 17             Images:  Imaging Results (Last 24 Hours)     Procedure Component Value Units Date/Time    XR Chest 1 View [[id number]] Collected: 02/05/21 0903     Updated: 02/05/21 0908    Narrative:      EXAMINATION: XR CHEST 1 VW-02/05/2021:     INDICATION: Respiratory failure; J96.01-Acute respiratory failure with  hypoxia; U07.1-COVID-19; J12.82-Pneumonia due to Coronavirus disease  2019; J93.9-Pneumothorax, unspecified; J93.9-Pneumothorax, unspecified.     COMPARISON: 02/04/2021.     FINDINGS: Left-sided pigtail catheter has been removed. No pneumothorax  or significant effusion is seen. The coarse diffuse interstitial  disease  pattern of the lungs seen on yesterday's study is stable. The heart  appears upper normal size.       Impression:      Left pigtail catheter removal with no evidence of  pneumothorax or effusion. Extensive but stable bilateral pulmonary  interstitial disease.      D:  02/05/2021  E:  02/05/2021          XR Chest 1 View [[id number]] Collected: 02/04/21 0821     Updated: 02/04/21 2204    Narrative:      EXAMINATION: XR CHEST 1 VW- 02/04/2021     INDICATION: J96.01-Acute respiratory failure with hypoxia;  U07.1-COVID-19; J12.82-Pneumonia due to Coronavirus disease 2019;  J93.9-Pneumothorax, unspecified; J93.9-Pneumothorax, unspecified     COMPARISON: 02/03/2021     FINDINGS: Left pigtail drain remains in place. Tracheostomy tube and  PICC line are again noted. Heart shadow is upper limits of normal size.  Coarse bilateral pulmonary interstitial disease is greater on the right  than left, but improved from the prior study. No effusion or  pneumothorax is seen.       Impression:      Persistent but improving bilateral pulmonary interstitial  disease, compared to 02/03/2021 exam.       D:  02/04/2021  E:  02/04/2021     This report was finalized on 2/4/2021 10:01 PM by Dr. Azam Villaseñor MD.       XR Chest 1 View [[id number]] Collected: 02/04/21 1331     Updated: 02/04/21 2151    Narrative:      EXAMINATION: XR CHEST 1 VW-02/04/2021:     INDICATION: Left chest tube clamped; J96.01-Acute respiratory failure  with hypoxia; U07.1-COVID-19; J12.82-Pneumonia due to Coronavirus  disease 2019; J93.9-Pneumothorax, unspecified; J93.9-Pneumothorax,  unspecified.     COMPARISON: 02/04/2021.     FINDINGS: Left-sided pigtail catheter remains in place. No pneumothorax  is seen. PICC line remains in the mid SVC. Diffuse pulmonary  interstitial disease remains relatively extensive but stable. No new  chest disease is seen.       Impression:      Stable pulmonary interstitial disease. No evidence of  pneumothorax or new chest pathology  is seen.     D:  02/04/2021  E:  02/04/2021            This report was finalized on 2/4/2021 9:48 PM by Dr. Azam Villaseñor MD.               Results: Reviewed.  I reviewed the patient's new laboratory and imaging results.  I independently reviewed the patient's new images.    Medications: Reviewed.    Assessment/Plan   A / P     Ms. Gunter is a 77yo F with a history of COPD, DM, hypothyroidism and hypertension who presented to [facility] on 1/8/21 with shortness of air and weakness after testing positive for COVID-19 on 1/4/21. She was exposed to COVID through her son. She was placed on Remdesivir, Decadron and Lovenox and given empiric antibiotics. She was transferred to the ICU on 1/10/21 for worsening hypoxia and was intubated. On 1/18/21, sedation was weaned but she then developed disconjugate gaze. She underwent imaging of the head which was negative for stroke and bleeding. She was started on Precedex but this was discontinued when she developed severe hypotension. On 1/20/21, she developed a fever. C. Diff was negative. Sputum culture showed Klebsiella and Stenotrophomonas. She was then started on Cefepime and Vancomycin by ID and Micafungin for yeast dermatitis. A left sided chest tube was placed on 1/20/21 for a left pneumothorax. She developed a spontaneous pneumothorax on the right on 1/25/21 and a chest tube was placed emergently. She underwent a trach/PEG on 1/27/21. Neurology was consulted on 1/29/21 for persistent encephalopathy. EEG was performed and showed generalized slowing but no seizure activity.     Despite antibiotic therapy, she has continued to have a fever. Seroquel has been discontinued as of 1/31 in case of drug fever. She has remained on a Fentanyl drip. The right chest tube was removed on 2/1/21. The left chest tube was removed on 2/4/21.       Nutrition:   NPO Diet  Advance Directives:   Code Status and Medical Interventions:   Ordered at: 01/08/21 4651     Level Of Support Discussed With:     Patient     Code Status:    CPR     Medical Interventions (Level of Support Prior to Arrest):    Full       Active Hospital Problems    Diagnosis   • **Acute respiratory failure with hypoxia, status post tracheostomy on 1/27/2021   • Metabolic encephalopathy, likely multifactorial   • Pneumothorax, bilateral   • COVID-19 virus detected   • Disease of thyroid gland   • Diabetes mellitus (CMS/HCC)   • Hypertension       Assessment / Plan:    Continue mechanical ventilation. PST as tolerated. Thus far not tolerating any vent weaning.   Continue antibiotics. Follow up culture results.       Increase Seroquel again today.   Wean Fentanyl.  Continue tube feeds. Slowly advance as bowel movements improve.    Decrease free water.   Start mucomyst nebs.   Continue current insulin regimen.   Continue low-dose midodrine.  Continue bowel regimen.  AM labs and CXR    High level of risk due to:  severe exacerbation of chronic illness and illness with threat to life or bodily function.    Plan of care and goals reviewed during interdisciplinary rounds.  I discussed the patient's findings and my recommendations with nursing staff        Aubrie Vincent, DO    Intensive Care Medicine and Pulmonary Medicine

## 2021-02-05 NOTE — PROGRESS NOTES
Clinical Nutrition     Nutrition Support   Reason for Visit:   MDR, Follow-up protocol, EN    Patient Name: Mei Gunter  YOB: 1945  MRN: [id number]  Date of Encounter: 02/05/21 10:44 EST  Admission date: 1/8/2021    Nutrition Assessment   Assessment     Applicable diagnosis, conditions, procedures:  Covid 19 PNA  Acute respiratory failure   Intubated (1/9)  Disconjugate gaze (1/19)  Fever   Leukocytosis  Volume overload with significant third spaced edema  L pneumothorax, s/p chest tube placement (1/20)  R pneumothorax, s/p chest tube placement (1/25)  S/p tracheostomy and PEG placements (1/27)  Encephalopathy  Fevers    Past Medical History   Asthma  COPD  T2DM   HTN  RLS  Hernia Repair     Reported/Observed/Food/Nutrition Related History:     EN rate continues at 50. Pt having residuals in the 300's. OK per intensivist to slowly increase EN back to goal rate. Na+ of 127 today, will decrease free water from 50 ml/hr to 20 ml/hr.       Per I&O over the past 24 hrs:  1 bowel movement      Labs reviewed       Results from last 7 days   Lab Units 02/05/21  0417 02/04/21  0330 02/03/21  0340   SODIUM mmol/L 127* 130* 138   POTASSIUM mmol/L 4.9 4.4 4.7   CHLORIDE mmol/L 97* 97* 104   CO2 mmol/L 23.0 23.0 24.0   BUN mg/dL 22 20 19   CREATININE mg/dL 0.69 0.67 0.59   GLUCOSE mg/dL 171* 118* 122*   CALCIUM mg/dL 8.8 8.5* 8.7   PHOSPHORUS mg/dL 2.8 2.8 2.2*     Results from last 7 days   Lab Units 02/05/21  0505 02/04/21  2323 02/04/21  1708 02/04/21  1151 02/04/21  0522 02/03/21  2329   GLUCOSE mg/dL 168* 187* 148* 119 118 135*     Lab Results   Lab Value Date/Time    HGBA1C 6.80 (H) 01/08/2021 1515       Medications reviewed   Pertinent: antibiotic, senokot, colace, insulin, oxycodone, protonix, miralax, seroquel, requip  GTT: fentanyl  PRN: tylenol, xanax, norco, ativan    relistor given on (1/14 and 1/16)   pericolace given from (1/9-1/16)  1 dose of lokelma given (1/15)    Needs  Assessment (2/1)   Height used: 66 in/168 cm  Weight used: 228 lb/104 kg -- ? closer to dry weight  IBW: 130 lb/59 kg    Estimate calorie needs: ~2000 kcal/day  PSU (Ve= 12.2, Tmax= 38)= 2026 kcal  20 kcal/kg actual weight= 2080 kcal    Estimated protein needs: ~118 g pro/day  2.0 g/kg IBW= 118 g pro  1.0-1.2 g/kg actual weight= 104-124 g pro      Current Nutrition Prescription   PO: NPO Diet     EN: Peptamen AF at 80 ml/hr (goal volume= 1600 ml/day) and free water at 50 ml/hr  Route: PEG  Verified at the bedside: No -- per current [facility] policy  Provides at goal volume: 1920 kcal, 121 g pro, 9 g fiber, 1298 ml water from EN, 2298 ml water total      Evaluation of delivered nutrients/fluids-EN:  1 Day:  857 ml, 54%      Nutrition Diagnosis   Date: 1-9-21  Updated: 1-13, 1/13  Problem Inadequate energy intake   Etiology Per Clinical Status: ARF/VENT   Signs/Symptoms NPO    Status: improved, EN started on 1-11    Nutrition Intervention    Follow treatment progress, Care plan reviewed, Nutrition support order placed   -Will decrease free water to 20 ml/hr  -Which will provide at goal volume: 1698 ml water total/day    -Increase EN by 10 ml q 8 hrs to goal rate of 80 ml/hr as pt tolerates    -Will continue to follow and adjust nutrition support regimen as medically appropriate       Goal:   General: Nutrition support treatment  EN/PN: Tolerate EN at goal, Adjust EN    Monitoring/Evaluation:   Per protocol, I&O, Pertinent labs, EN delivery/tolerance, Weight, GI status, Symptoms    Sherry Bennett, MS RD/LD CNSC  Time Spent: 45 minutes

## 2021-02-05 NOTE — PROGRESS NOTES
INFECTIOUS DISEASE progress Note    Mei Gunter  1945  [id number]    Date: 2/5/2021    Admission Date: 1/8/2021      Requesting Provider: Dmitry Hernandez MD  Evaluating Physician: Harry Tran III, MD    Reason for Consultation: COVID-19 acute respiratory failure     History of present illness:    Patient is a 76 y.o. female with h/o COPD, T2DM, hypothyroidism, and HTN who presented to [facility] ED on 1/8/21 with increasing shortness of breath, weakness, fever, and dark black diarrhea.  The patient had shortness of breath and cough that started about 10 days ago with fever about 5 days ago of greater than 100F.  She tested positive for COVID-19 on or about 1/4/21 and caught COVID-19 from her son.  She was placed on Keflex by her PCP for possible bronchitis.  She also had nausea, vomiting, diarrhea and was waiting for Cdiff toxin test result.  She was having up to 5 to 10 stools a day and towards the latter part of the diarrhea, it had become sticky and slimy.  She denies dysuria or rashes.  Work up showed Tmax 100.8, worsening hypoxia now requiring 70% FiO2 45L O2 by high flow oxygen, A1C 6.8, lactic acid 1.2, D-dimer 1.8-->1.52, WBC 5700 with 86% neutrophils, Ferritin 334-->334, CRP 19.1-->31.75, -->520, proBNP 492, PCT 0.13, , and creatinine 1.02. A respiratory panel PCR is positive for COVID-19.  Blood cultures are pending. A CXR shows diffuse multifocal airspace disease.  She is currently on Remdesivir, Dexamethasone, and Lovenox.  ID was asked to evaluate and manage her antimicrobial therapy.     1/10/2021 patient in transfer the intensive care unit because of worsening hypoxia is currently intubated and sedated review of systems are unobtainable  1/11/21; patient is critically ill on pressor, vent, sedated fio2 45%; ros unobtainable  1/12/21; no events overnight; off of pressors, on vent; on tube feeds; no fever, ros unobtainable  1/13/21; on increasing fio2, sedation, on vent, on pressors;  started on zosyn.  On intermittent paralytics    1/14/21; patient is critically ill on vent peep 14, low dose pressors, sedated ros unobtainable    1/15/2021 patient is on low-dose Levophed likely for decreased vascular tone from sedation; patient is not requiring paralytics; FiO2 is down to 40%; patient is afebrile and hemodynamically stable with the exception of low-dose hypotension.  Review of systems are unobtainable    1/18/2021 patient on FiO2 of 40% propofol turned off patient remains on fentanyl Versed opens eyes to noxious stimulation afebrile normotensive  1/19/21; patient remains on vent, peep at 8, tolerating tube feeds; sedated ros unobtainable; afebrile, hemodynamically stable.  1/20/21; has fever, elevation of wbc, good response to bumex yesterday; on fio2 50%; started on levophed drip for hypotension. Remains critically ill. Had left sided pneumothorax treated with chest tube (pigtail drain)    1/21/2021; having low-grade temperatures remains intubated sedated on fentanyl drip given Ativan as needed for agitation restlessness; review of systems are unobtainable patient remains critically ill    1/22/21; no events overnight; afebrile, patient now on versed, fentanyl, remains intubated on fio2 40%; ros unobtainable    1/23/2021 patient is critically ill afebrile hemodynamically stable remains on FiO2 sedation is difficult patient is receiving as needed Ativan patient has been on Bumex drip review of systems are unobtainable    1/24/21; patient remains critically ill; on vent, on dilaudid drip for sedation  abx changed to ceftazidime last night;   Has low grade fevers on fio2 40%.  Ros unobtainable    1/25/2021 patient now has right-sided pneumothorax had chest tube placed today patient sedated on ventilator is hemodynamically stable has low-grade temperatures review of systems are unobtainable    1/26/21; no events overnight; remains critically ill remains on vent, sedation, ros  unobtainable    1/27/21; patient remains critically ill; on vent; returned from the OR for trach and peg today; has been diuresed today patient sedated on FiO2 40% no distress has been off of airborne precautions now    1/28/2021 patient remains on Dilaudid drip plan is to decrease this sedation patient is on low-dose of Levophed.  Patient is confused not following commands patient remains on vent receiving blood products today    1/29/21 patient is off of Dilaudid drip is resting quietly on ventilator patient opens eyes to voice but cannot follow commands    2/1/21; patient has fever now; is on fio2 50% resting quietly; on dilaudid drip.    2/2/21; continues to have fever, otherwise hemodynamically stable; seroquel held; on vent;    2/3/2021; has fevers has been started on Zyprexa is on Dilaudid drip remains intubated patient is sedated    2/4/2021; patient remains on ventilator been having fevers is currently hemodynamically stable    2/5/21: Left Chest tube removed on 2/4/21. Doing well on trach on 60% FiO2/PEEP 5.  On Ceftazidime.  Sputum culture with Klebsiella aerogenes susc to Ceftazidime.     Review of systems unobtainable        Review of systems are unobtainable  Past Medical History:   Diagnosis Date   • Asthma    • COPD (chronic obstructive pulmonary disease) (CMS/HCC)    • Diabetes mellitus (CMS/HCC)    • Disease of thyroid gland    • Hypertension    • Restless leg syndrome    • Seasonal allergies        Past Surgical History:   Procedure Laterality Date   • BLADDER REPAIR     • CLAVICLE SURGERY     • HERNIA REPAIR     • HYSTERECTOMY     • RECTAL PROLAPSE REPAIR     • TRACHEOSTOMY AND PEG TUBE INSERTION N/A 1/27/2021    Procedure: TRACHEOSTOMY AND PERCUTANEOUS ENDOSCOPIC GASTROSTOMY TUBE INSERTION;  Surgeon: Eliud Dumont MD;  Location: [facility];  Service: General;  Laterality: N/A;   • TUBAL ABDOMINAL LIGATION         Family History   Problem Relation Age of Onset   • Diabetes Mother    • Asthma  Mother    • Diabetes Father    • COPD Father        Social History     Socioeconomic History   • Marital status:      Spouse name: Not on file   • Number of children: Not on file   • Years of education: Not on file   • Highest education level: Not on file   Tobacco Use   • Smoking status: Never Smoker   • Smokeless tobacco: Never Used   Substance and Sexual Activity   • Alcohol use: Never     Frequency: Never   • Drug use: Never   • Sexual activity: Never       Allergies   Allergen Reactions   • Actonel [Risedronate] Myalgia   • Hydrocodone Nausea Only   • Levaquin [Levofloxacin] Myalgia   • Other Myalgia     Ebista: legs hurt   • Stadol [Butorphanol] Other (See Comments)     Lowers blood pressure           Medication:    Current Facility-Administered Medications:   •  [DISCONTINUED] acetaminophen (TYLENOL) tablet 650 mg, 650 mg, Oral, Q4H PRN, 650 mg at 01/08/21 2142 **OR** acetaminophen (TYLENOL) 160 MG/5ML solution 650 mg, 650 mg, Per G Tube, Q4H PRN, 650 mg at 02/02/21 2132 **OR** acetaminophen (TYLENOL) suppository 650 mg, 650 mg, Rectal, Q4H PRN, Inez Alonzo, PharmD, 650 mg at 02/03/21 0131  •  acetylcysteine (MUCOMYST) 20 % nebulizer solution 2 mL, 2 mL, Nebulization, BID - RT, Aubrie Vincent, DO  •  albuterol sulfate HFA (PROVENTIL HFA;VENTOLIN HFA;PROAIR HFA) inhaler 4 puff, 4 puff, Inhalation, Q6H - RT, Eliud Dumont MD, 4 puff at 02/05/21 1300  •  ALPRAZolam (XANAX) tablet 0.5 mg, 0.5 mg, Oral, TID PRN, Elo Todd MD, 0.5 mg at 02/05/21 0319  •  artificial tears ophthalmic ointment, , Both Eyes, Q1H PRN, Eliud Dumont MD, Given at 01/23/21 0319  •  bisacodyl (DULCOLAX) suppository 10 mg, 10 mg, Rectal, Daily PRN, Eliud Dumont MD, 10 mg at 02/03/21 1658  •  cefTAZidime (FORTAZ) 2 g/100 mL 0.9% NS IVPB (mpb), 2 g, Intravenous, Q8H, Aubrie Vincent DO, 2 g at 02/05/21 0535  •  chlorhexidine (PERIDEX) 0.12 % solution 15 mL, 15 mL, Mouth/Throat, Q12H, Eliud Dumont  MD, 15 mL at 02/05/21 0839  •  dextrose (D50W) 25 g/ 50mL Intravenous Solution 25 g, 25 g, Intravenous, Q15 Min PRN, Eliud Dumont MD, 25 g at 02/01/21 1232  •  sennosides (SENOKOT) 8.8 MG/5ML syrup 10 mL, 10 mL, Per G Tube, BID, 10 mL at 02/05/21 0903 **AND** docusate sodium (COLACE) liquid 100 mg, 100 mg, Per G Tube, BID, Inez Alonzo, PharmD, 100 mg at 02/05/21 0839  •  enoxaparin (LOVENOX) syringe 110 mg, 1 mg/kg, Subcutaneous, Q12H, Vicki Samaniego, PharmD, 110 mg at 02/05/21 0839  •  fentaNYL 2500 mcg/250 mL NS infusion,  mcg/hr, Intravenous, Titrated, Carlton Aubrie MELISSA, DO, Last Rate: 20 mL/hr at 02/05/21 0928, 200 mcg/hr at 02/05/21 0928  •  HYDROcodone-acetaminophen (NORCO) 5-325 MG per tablet 1 tablet, 1 tablet, Oral, Q6H PRN, Elo Todd MD, 1 tablet at 02/05/21 0839  •  influenza vac split quad (FLUZONE,FLUARIX,AFLURIA,FLULAVAL) injection 0.5 mL, 0.5 mL, Intramuscular, During Hospitalization, Eliud Dumont MD  •  insulin detemir (LEVEMIR) injection 10 Units, 10 Units, Subcutaneous, Q12H, Sandie West APRN, 10 Units at 02/05/21 0839  •  insulin regular (humuLIN R,novoLIN R) injection 0-24 Units, 0-24 Units, Subcutaneous, Q6H, Eliud Dumont MD, 4 Units at 02/05/21 0546  •  Levothyroxine Sodium injection 75 mcg, 75 mcg, Intravenous, Daily, Eliud Dumont MD, 75 mcg at 02/05/21 1025  •  LORazepam (ATIVAN) injection 0.5 mg, 0.5 mg, Intravenous, Q4H PRN, Aubrie Vincent, DO, 0.5 mg at 02/05/21 1302  •  LORazepam (ATIVAN) injection 1 mg, 1 mg, Intravenous, Q6H, Case, Aubrie V., DO, 1 mg at 02/05/21 1158  •  Magnesium Sulfate 2 gram Bolus, followed by 8 gram infusion (total Mg dose 10 grams)- Mg less than or equal to 1mg/dL, 2 g, Intravenous, PRN **OR** Magnesium Sulfate 2 gram / 50mL Infusion (GIVE X 3 BAGS TO EQUAL 6GM TOTAL DOSE) - Mg 1.1 - 1.5 mg/dl, 2 g, Intravenous, PRN **OR** Magnesium Sulfate 4 gram infusion- Mg 1.6-1.9 mg/dL, 4 g, Intravenous, PRN, Eliud Dumont  MD CAROL, Last Rate: 25 mL/hr at 02/03/21 0955, 4 g at 02/03/21 0955  •  midodrine (PROAMATINE) tablet 5 mg, 5 mg, Per G Tube, TID AC, Sandie West, APRN, 5 mg at 02/05/21 1158  •  nystatin (MYCOSTATIN) powder, , Topical, Q12H, Eliud Dumont MD, Given at 02/05/21 0903  •  ondansetron (ZOFRAN) tablet 4 mg, 4 mg, Per G Tube, Q6H PRN **OR** ondansetron (ZOFRAN) injection 4 mg, 4 mg, Intravenous, Q6H PRN, Inez Alonzo, PharmD  •  oxyCODONE (ROXICODONE) immediate release tablet 10 mg, 10 mg, Per G Tube, Q6H, Inez Alonzo, PharmD, 10 mg at 02/05/21 1158  •  pantoprazole (PROTONIX) injection 40 mg, 40 mg, Intravenous, Q12H, Eliud Dumont MD, 40 mg at 02/05/21 0839  •  pneumococcal polysaccharide 23-valent (PNEUMOVAX-23) vaccine 0.5 mL, 0.5 mL, Intramuscular, During Hospitalization, Eliud Dumont MD  •  polyethylene glycol (MIRALAX) packet 17 g, 17 g, Per G Tube, Daily, Inez Alonzo, PharmD, 17 g at 02/05/21 0839  •  potassium chloride (KLOR-CON) packet 40 mEq, 40 mEq, Per PEG Tube, PRN, Inez Alonzo, PharmD  •  potassium phosphate 45 mmol in sodium chloride 0.9 % 250 mL infusion, 45 mmol, Intravenous, PRN **OR** potassium phosphate 30 mmol in sodium chloride 0.9 % 100 mL infusion, 30 mmol, Intravenous, PRN **OR** potassium phosphate 15 mmol in sodium chloride 0.9 % 100 mL infusion, 15 mmol, Intravenous, PRN **OR** sodium phosphates 45 mmol in sodium chloride 0.9 % 250 mL IVPB, 45 mmol, Intravenous, PRN **OR** sodium phosphates 30 mmol in sodium chloride 0.9 % 100 mL IVPB, 30 mmol, Intravenous, PRN, Last Rate: 25 mL/hr at 01/13/21 1254, 30 mmol at 01/13/21 1254 **OR** sodium phosphates 15 mmol in sodium chloride 0.9 % 100 mL IVPB, 15 mmol, Intravenous, PRN, Eliud Dumont MD, Last Rate: 50 mL/hr at 01/26/21 0916, 15 mmol at 01/26/21 0916  •  QUEtiapine (SEROquel) tablet 100 mg, 100 mg, Per G Tube, Q12H, Aubrie Vincent, DO  •  rOPINIRole (REQUIP) tablet 4 mg, 4 mg, Per G Tube, Nightly, Andrea Burgos,  MD, 4 mg at 02/04/21 2012  •  sodium chloride 0.9 % flush 10 mL, 10 mL, Intravenous, PRN, Eliud Dumont MD, 10 mL at 02/04/21 2013    Antibiotics:  Anti-Infectives (From admission, onward)    Ordered     Dose/Rate Route Frequency Start Stop    02/03/21 0918  cefTAZidime (FORTAZ) 2 g/100 mL 0.9% NS IVPB (mpb)     Inez Alonzo, PharmD reviewed the order on 02/03/21 1151.   Ordering Provider: Aubrie Vincent, DO    2 g  over 30 Minutes Intravenous Every 8 Hours Scheduled 02/03/21 1015 02/10/21 1359    01/27/21 1439  valACYclovir (VALTREX) tablet 1,000 mg     Ordering Provider: Harry Tran MD    1,000 mg Per G Tube Every 12 Hours Scheduled 01/27/21 1441 01/27/21 2042    01/22/21 1022  micafungin 100 mg/100 mL 0.9% NS IVPB (mbp)     Lee Moreno, PharmD reviewed the order on 01/29/21 1427.   Ordering Provider: Harry Tran MD    100 mg  over 60 Minutes Intravenous Every 24 Hours 01/22/21 1115 02/01/21 1219    01/20/21 1457  cefepime (MAXIPIME) 2 g/100 mL 0.9% NS (mbp)     Ordering Provider: Harry Tran MD    2 g  200 mL/hr over 30 Minutes Intravenous Once 01/20/21 1545 01/20/21 1630    01/14/21 1345  levoFLOXacin (LEVAQUIN) 500 mg/100 mL D5W (premix) (LEVAQUIN) 500 mg     Cornelia Tompkins, PharmD reviewed the order on 01/15/21 0713.   Ordering Provider: Harry Tran MD    500 mg Intravenous Every 24 Hours 01/14/21 1500 01/20/21 1412    01/13/21 0917  piperacillin-tazobactam (ZOSYN) 3.375 g in iso-osmotic dextrose 50 ml (premix)     Ordering Provider: Adelso Bach MD    3.375 g  over 30 Minutes Intravenous Once 01/13/21 1015 01/13/21 1052    01/09/21 1402  remdesivir 100 mg in sodium chloride 0.9 % 250 mL IVPB (powder vial)     Cornelia Tompkins, PharmD reviewed the order on 01/15/21 1335.   Ordering Provider: Larry Hawkins PA    100 mg  over 60 Minutes Intravenous Every 24 Hours 01/10/21 1200 01/17/21 1229    01/08/21 1801  remdesivir 100 mg in sodium chloride 0.9 % 250  mL IVPB (powder vial)     Ordering Provider: Mena Ribeiro APRN    100 mg  over 60 Minutes Intravenous Every 24 Hours 21 1800 21 1912    21 1801  remdesivir 200 mg in sodium chloride 0.9 % 250 mL IVPB (powder vial)     Ordering Provider: Mena Ribeiro APRN    200 mg  over 60 Minutes Intravenous Every 24 Hours 21 2200 21 2242            Review of Systems:  See HPI      Physical Exam:   Vital Signs  Temp (24hrs), Av.5 °F (36.9 °C), Min:97.2 °F (36.2 °C), Max:99.5 °F (37.5 °C)    Temp  Min: 97.2 °F (36.2 °C)  Max: 99.5 °F (37.5 °C)  BP  Min: 87/58  Max: 163/76  Pulse  Min: 83  Max: 114  Resp  Min: 32  Max: 45  SpO2  Min: 87 %  Max: 100 %    GENERAL: Resting quietly on vent  fio2 60%   HEENT: Normocephalic, atraumatic.  No ext oral lesions  Tracheostomy present  HEART: Normal rate by monitor  LUNGS: symmetrical inspiration; on vent;   ABDOMEN: nondistended.  Nontender  Skin no joint deformity    SKIN: No rashes on exposed skin    Laboratory Data    Results from last 7 days   Lab Units 21  0330 21  0340   WBC 10*3/mm3 8.90 7.57 8.82   HEMOGLOBIN g/dL 7.3* 7.4* 7.9*   HEMATOCRIT % 23.8* 24.5* 26.2*   PLATELETS 10*3/mm3 336 346 354     Results from last 7 days   Lab Units 21   SODIUM mmol/L 127*   POTASSIUM mmol/L 4.9   CHLORIDE mmol/L 97*   CO2 mmol/L 23.0   BUN mg/dL 22   CREATININE mg/dL 0.69   GLUCOSE mg/dL 171*   CALCIUM mg/dL 8.8     Results from last 7 days   Lab Units 21   ALK PHOS U/L 64   BILIRUBIN mg/dL 0.2   ALT (SGPT) U/L 10   AST (SGOT) U/L 14         Results from last 7 days   Lab Units 21   CRP mg/dL 33.68*         Results from last 7 days   Lab Units 21  0417 21  0330 21  0340   CK TOTAL U/L 24 38 61         Estimated Creatinine Clearance: 74.7 mL/min (by C-G formula based on SCr of 0.69 mg/dL).      Microbiology:  Microbiology Results (last 10 days)     Procedure Component Value - Date/Time     Respiratory Culture - Aspirate, Bronchus [[id number]]  (Abnormal)  (Susceptibility) Collected: 02/03/21 1156    Lab Status: Preliminary result Specimen: Aspirate from Bronchus Updated: 02/05/21 1101     Respiratory Culture Moderate growth (3+) Klebsiella aerogenes     Gram Stain Few (2+) WBCs per low power field      Rare (1+) Epithelial cells per low power field      Rare (1+) Gram negative bacilli    Narrative:      Organism under investigation.2/5/21      Susceptibility      Klebsiella aerogenes     EFRAIN     Cefepime Susceptible     Ceftazidime Susceptible     Ceftriaxone Susceptible     Gentamicin Susceptible     Levofloxacin Susceptible     Piperacillin + Tazobactam Susceptible     Tetracycline Susceptible     Trimethoprim + Sulfamethoxazole Susceptible                Susceptibility Comments     Klebsiella aerogenes    Cefazolin sensitivity will not be reported for Enterobacteriaceae in non-urine isolates. If cefazolin is preferred, please call the microbiology lab to request an E-test.  With the exception of urinary-sourced infections, aminoglycosides should not be used as monotherapy.             Blood Culture - Blood, Arm, Left [[id number]] Collected: 02/03/21 1049    Lab Status: Preliminary result Specimen: Blood from Arm, Left Updated: 02/05/21 1131     Blood Culture No growth at 2 days    Herpes Simplex Virus Culture - Swab, Oropharynx [[id number]] Collected: 01/27/21 0004    Lab Status: Final result Specimen: Swab from Oropharynx Updated: 01/29/21 1908     HSV Culture Without Typing Negative    Narrative:      Performed at:  [address] [place] [address]  [id number]  : Steve Cooper PhD, Phone:  [phone]                Radiology:  Imaging Results (Last 72 Hours)     Procedure Component Value Units Date/Time    XR Chest 1 View [[id number]] Collected: 02/05/21 0903     Updated: 02/05/21 0908    Narrative:      EXAMINATION: XR CHEST 1 VW-02/05/2021:     INDICATION:  Respiratory failure; J96.01-Acute respiratory failure with  hypoxia; U07.1-COVID-19; J12.82-Pneumonia due to Coronavirus disease  2019; J93.9-Pneumothorax, unspecified; J93.9-Pneumothorax, unspecified.     COMPARISON: 02/04/2021.     FINDINGS: Left-sided pigtail catheter has been removed. No pneumothorax  or significant effusion is seen. The coarse diffuse interstitial disease  pattern of the lungs seen on yesterday's study is stable. The heart  appears upper normal size.       Impression:      Left pigtail catheter removal with no evidence of  pneumothorax or effusion. Extensive but stable bilateral pulmonary  interstitial disease.      D:  02/05/2021  E:  02/05/2021          XR Chest 1 View [[id number]] Collected: 02/04/21 0821     Updated: 02/04/21 2204    Narrative:      EXAMINATION: XR CHEST 1 VW- 02/04/2021     INDICATION: J96.01-Acute respiratory failure with hypoxia;  U07.1-COVID-19; J12.82-Pneumonia due to Coronavirus disease 2019;  J93.9-Pneumothorax, unspecified; J93.9-Pneumothorax, unspecified     COMPARISON: 02/03/2021     FINDINGS: Left pigtail drain remains in place. Tracheostomy tube and  PICC line are again noted. Heart shadow is upper limits of normal size.  Coarse bilateral pulmonary interstitial disease is greater on the right  than left, but improved from the prior study. No effusion or  pneumothorax is seen.       Impression:      Persistent but improving bilateral pulmonary interstitial  disease, compared to 02/03/2021 exam.       D:  02/04/2021  E:  02/04/2021     This report was finalized on 2/4/2021 10:01 PM by Dr. Azam Villaseñor MD.       XR Chest 1 View [[id number]] Collected: 02/04/21 1331     Updated: 02/04/21 2151    Narrative:      EXAMINATION: XR CHEST 1 VW-02/04/2021:     INDICATION: Left chest tube clamped; J96.01-Acute respiratory failure  with hypoxia; U07.1-COVID-19; J12.82-Pneumonia due to Coronavirus  disease 2019; J93.9-Pneumothorax, unspecified; J93.9-Pneumothorax,  unspecified.      COMPARISON: 02/04/2021.     FINDINGS: Left-sided pigtail catheter remains in place. No pneumothorax  is seen. PICC line remains in the mid SVC. Diffuse pulmonary  interstitial disease remains relatively extensive but stable. No new  chest disease is seen.       Impression:      Stable pulmonary interstitial disease. No evidence of  pneumothorax or new chest pathology is seen.     D:  02/04/2021  E:  02/04/2021            This report was finalized on 2/4/2021 9:48 PM by Dr. Azam Villaseñor MD.       XR Chest 1 View [[id number]] Collected: 02/03/21 0827     Updated: 02/03/21 0831    Narrative:      EXAMINATION: XR CHEST 1 VW-      INDICATION: resp failure; J96.01-Acute respiratory failure with hypoxia;  U07.1-COVID-19; J12.82-Pneumonia due to Coronavirus disease 2019;  J93.9-Pneumothorax, unspecified; J93.9-Pneumothorax, unspecified      COMPARISON: One day prior     FINDINGS: Support hardware projects unchanged. Unchanged bilateral dense  pulmonary opacifications. No distinct pneumothorax. Heart and  mediastinal contours remain obscured.       Impression:      No significant interval change.     This report was finalized on 2/3/2021 8:28 AM by Amor Asencio.       XR Chest 1 View [[id number]] Collected: 02/02/21 2149     Updated: 02/02/21 2151    Narrative:      CR Chest 1 Vw    INDICATION:   Change in respiratory status     COMPARISON:    Same date at 4:55 AM    FINDINGS:  Single portable AP view(s) of the chest.    Support lines and tubes are unchanged.    Prominent patchy airspace disease is demonstrated bilaterally. This shows modest improvement when compared to the prior study of the same date. No pneumothorax. No obvious pleural fluid.      Impression:      Prominent bilateral patchy airspace disease which shows modest improvement when compared to the prior study of the same date.    Signer Name: Martin Arteaga MD   Signed: 2/2/2021 9:49 PM   Workstation Name: [id number]    [facility]         Estimated Creatinine Clearance: 74.7 mL/min (by C-G formula based on SCr of 0.69 mg/dL).    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    Patient given multiple course of antibiotics including Zosyn.  Levaquin.  Ceftazidime.  And vancomycin.      Patient cannot be weaned off ventilator will likely have recurrent pneumonia.    Patient is up in the hospital 26 days and in the ICU 25 days    Observe of antibiotics for the time being given patient is hemodynamically stable    Patient is critically ill; follow-up new cultures sputum culture positive for Klebsiella aerogenes susc to Ceftazidime.    Guarded prognosis family wishes for full support at this time    Harry Tran MD saw and examined patient, verified hx and PE,  reviewed labs and micro data, and formulated dx, plan for treatment and all medical decision making.      Larry Hawkins PA-C for Harry Tran MD     I have seen and examined patient and agree with above    DESIRE Gomez  2/5/2021  13:22 EST

## 2021-02-06 NOTE — DISCHARGE SUMMARY
Discharge Summary    Patient name: Mei Gunter  CSN: [id number]  MRN: [id number]  : 1945  Today's date: 2021     Date of Admission: 2021  Date of Discharge:  2021    Admitting Physician:  Dr. Monahan  Primary Care Provider: Mich Castro MD  Consultations:   ID:  Dr. Chelsea Griffin Surgery: Dr. Dumont  Neurology:  Dr. Don     Admission Diagnosis:  Acute Respiratory Failure     Discharge Diagnoses:   Active Hospital Problems    Diagnosis   • **Acute respiratory failure with hypoxia, status post tracheostomy on 2021   • Metabolic encephalopathy, likely multifactorial   • Pneumothorax, bilateral   • COVID-19 virus detected   • Disease of thyroid gland   • Diabetes mellitus (CMS/HCC)   • Hypertension     Allergies:  Actonel [risedronate], Hydrocodone, Levaquin [levofloxacin], Other, and Stadol [butorphanol]    Code Status and Medical Interventions:   Ordered at: 21 1253     Level Of Support Discussed With:    Patient     Code Status:    CPR     Medical Interventions (Level of Support Prior to Arrest):    Full     Procedures/Testin21 BLE venous duplex - Normal bilateral lower extremity venous duplex scan    21 Left chest tube insertion, discontinued 21 Right chest tube insertion, discontinued 21 TTE - Left ventricular systolic function is normal. Left ventricular ejection fraction appears to be 66 - 70%.  Left ventricular diastolic function is consistent with (grade Ia w/high LAP) impaired relaxation.  Left ventricular outflow tract peak flow gradient at rest is 25 mmHg.  The right ventricular cavity is borderline dilated.  The right atrial cavity is mildly dilated.  There is calcification of the aortic valve.  Moderate aortic valve stenosis is present.  Moderate aortic valve regurgitation is present.  Mitral annular calcification is present.  Mild to moderate mitral valve stenosis is present.  Mild mitral valve regurgitation is  present.  There is mild pulmonic valve regurgitation present.  Moderate tricuspid valve regurgitation is present.  Estimated right ventricular systolic pressure from tricuspid regurgitation is markedly elevated (>55 mmHg).    1/27/21  Trach and PEG placement     1/29/21  EEG - Moderate generalized slow.  No epileptiform activity is seen    2/3/21 BLE venous duplex - Acute left lower extremity deep vein thrombosis noted in the peroneal and gastrocnemius    History of Present Illness:  Mei Gunter is a 75 y.o. female with PMH significant for COPD, asthma, diabetes, hypothyroidism, hypertension, restless leg syndrome who presented to [facility] ED on 1/8/2021 with increased shortness of breath, weakness, fever, dark black diarrhea.  Patient's onset of symptoms started approximately 10 days ago with cough and dyspnea followed the next day 5 fever.  Patient went to her PCP and was found to be Covid positive.  Patient felt mildly unwell for several days and then began having dark diarrhea, generalized weakness and increased dyspnea.  Patient was seen at [facility] over the last 2 days and was sent home, so she called her PCP who asked her to come to [facility].  Upon arrival in the ED, patient's room air oxygen saturation was 83%, which improved with supplemental oxygen.  Patient will be admitted to hospital medicine service for further evaluation and treatment.     Started with sinus type symptoms, then mildly productive cough, soa, fever 101F about 10 days ago.  Note that she had some black diarrhea that started same time as well, was given imodium by PCP a few days ago and has resolved.  Has never had an EGD or colonoscopy.  Not sticky.    Hospital Course:  While on the floor, she became increasingly hypoxic requiring 100% NRB to sats in upper 80s.  She was transferred to the ICU and urgently intubated.  She was continued on Decadron, Remdesivir and Baricitinib.  She was also given Actemra x1 1/10/21.  ID  was consulted to manage her antibiotics.  She had a heme + stool, but H/H remained stable.  She was started on intermediate dosing of Lovenox and PPI BID.  BLE venous duplex was negative. Her oxygenation worsened and she was treated for asthma exacerbation and secondary pneumonia with Zosyn/LVQ.   Sputum was + Klebsiella and Stenotrophomonas.  She developed neuro changes and underwent CTA H/N revealed no LVO, but irregularities in the right MCA territory of at least moderate stenosis.  CTH that revealed no acute changes but large bilateral mastoid effusion w/total opacifications.  She has continued to have agitation with encephalopathy.  CTP was unremarkable.  She developed a left PTX and chest tube was placed 1/20 and removed 2/4.  She then developed a right PTX and had a chest tube placed 1/25 and it was removed 2/1.  She had an ECHO with results above.  She required prolonged mechanical ventilation due to increased secretions, hypoxia and encephalopathy.  She underwent a trach and PEG.  She had an EEG with results above.  She continues to be failure to wean from the ventilator due to agitation despite Seroquel, Ativan, Oxycodone and Fentanyl drip.  She has had intermittent fevers.  She was placed on Fortaz for trach site drainage for 7 days, which was started on 2/3.  Repeat sputum + Klebsiella and Stenotrophomonas.  ID recommended starting IV Bactrim if she continued to have fevers since both organisms are susceptible to it.  She also had another BLE venous duplex that was + DVT in left peroneal and gastrocnemius.  She is receiving therapeutic Lovenox q 12 hours.  She has had intermittent high residual requiring TF to be held.  Her TF is held once again this morning and g-tube placed to gravity.  We recommend gradually restarting her TF back with a possible lower goal rate initially.  Patient is tachypneic with RR 30-50s.  It is felt that this is neurogenic due to COVID encephalopathy.  It is felt at this time  "that she is ready for transfer to LTAC for continued ventilator weaning and rehab.       Vitals:  /52   Pulse 99   Temp 97.3 °F (36.3 °C) (Axillary)   Resp (!) 42   Ht 167.6 cm (66\")   Wt 112 kg (247 lb)   SpO2 96%   BMI 39.87 kg/m²     Physical Exam:  Telemetry:  Normal sinus rhythm.    Constitutional:  No acute distress.  Trach in place on mechanical ventilation.    Eyes: No scleral icterus.   PERRL, EOM intact.    Neck:  Supple, FROM   Cardiovascular: Normal rate, regular and rhythm. Normal heart sounds.  No murmurs, gallop or rub.   Respiratory: No respiratory distress. Normal respiratory effort.  Diminished bilaterally. No wheezing.      Abdominal:  Soft. No masses. Nontender. No distension. No HSM.   Extremities: No digital cyanosis. No clubbing.  No peripheral edema.   Skin: No rashes, lesions or ulcers   Neurological:             Moves eyes and extremities.   Does not follow commands.        Labs:  Results from last 7 days   Lab Units 02/07/21  0315   WBC 10*3/mm3 11.81*   HEMOGLOBIN g/dL 7.3*   HEMATOCRIT % 24.3*   PLATELETS 10*3/mm3 375     Results from last 7 days   Lab Units 02/07/21  0315   SODIUM mmol/L 133*   POTASSIUM mmol/L 5.4*   CHLORIDE mmol/L 101   CO2 mmol/L 25.0   BUN mg/dL 30*   CREATININE mg/dL 0.59   CALCIUM mg/dL 9.6   BILIRUBIN mg/dL <0.2   ALK PHOS U/L 65   ALT (SGPT) U/L 10   AST (SGOT) U/L 15   GLUCOSE mg/dL 129*         Magnesium   Date Value Ref Range Status   02/07/2021 2.0 1.6 - 2.4 mg/dL Final   02/06/2021 1.9 1.6 - 2.4 mg/dL Final   02/05/2021 2.0 1.6 - 2.4 mg/dL Final     Phosphorus   Date Value Ref Range Status   02/07/2021 4.0 2.5 - 4.5 mg/dL Final   02/06/2021 2.4 (L) 2.5 - 4.5 mg/dL Final   02/05/2021 2.8 2.5 - 4.5 mg/dL Final              Discharge Medications      New Medications      Instructions Start Date   acetaminophen 160 MG/5ML solution  Commonly known as: TYLENOL   650 mg, Per G Tube, Every 4 Hours PRN      acetylcysteine 20 % nebulizer " solution  Commonly known as: MUCOMYST   2 mL, Nebulization, 2 Times Daily - RT      albuterol (2.5 MG/3ML) 0.083% nebulizer solution  Commonly known as: PROVENTIL  Replaces: albuterol sulfate  (90 Base) MCG/ACT inhaler   2.5 mg, Nebulization, Every 6 Hours - RT      ALPRAZolam 0.5 MG tablet  Commonly known as: XANAX   0.5 mg, Oral, 3 Times Daily PRN      artificial tears ophthalmic ointment   1 application, Both Eyes, Every 1 Hour PRN      bisacodyl 10 MG suppository  Commonly known as: DULCOLAX   10 mg, Rectal, Daily PRN      chlorhexidine 0.12 % solution  Commonly known as: PERIDEX   15 mL, Mouth/Throat, Every 12 Hours Scheduled      docusate sodium 150 MG/15ML liquid  Commonly known as: COLACE   100 mg, Per G Tube, 2 Times Daily      enoxaparin 120 MG/0.8ML solution syringe  Commonly known as: LOVENOX   1 mg/kg (110 mg), Subcutaneous, Every 12 Hours      fentaNYL 10 mcg/1 mL NS    mcg/hr ( mcg/hr), Intravenous, Titrated      HYDROcodone-acetaminophen 5-325 MG per tablet  Commonly known as: NORCO   1 tablet, Oral, Every 6 Hours PRN      influenza vac split quad 0.5 ML suspension prefilled syringe injection  Commonly known as: FLUZONE,FLUARIX,AFLURIA,FLULAVAL   0.5 mL, Intramuscular, Once      insulin detemir 100 UNIT/ML injection  Commonly known as: LEVEMIR   10 Units, Subcutaneous, Every 12 Hours Scheduled      insulin regular 100 UNIT/ML injection  Commonly known as: humuLIN R,novoLIN R   0-24 Units, Subcutaneous, Every 6 Hours Scheduled      Levothyroxine Sodium 100 MCG/5ML solution injection  Replaces: levothyroxine 125 MCG tablet   75 mcg, Intravenous, Daily at 1100      LORazepam 2 MG/ML injection  Commonly known as: ATIVAN   2 mg, Intravenous, Every 4 Hours      LORazepam 2 MG/ML injection  Commonly known as: ATIVAN   0.5 mg, Intravenous, Every 4 Hours PRN      midodrine 5 MG tablet  Commonly known as: PROAMATINE   5 mg, Per G Tube, 3 Times Daily Before Meals      nystatin 827399 UNIT/GM  powder  Commonly known as: MYCOSTATIN   Topical, Every 12 Hours Scheduled      ondansetron 4 MG/2ML injection  Commonly known as: ZOFRAN   4 mg, Intravenous, Every 6 Hours PRN      oxyCODONE 10 MG tablet  Commonly known as: ROXICODONE   10 mg, Per G Tube, Every 4 Hours Scheduled      pantoprazole 40 MG injection  Commonly known as: PROTONIX   40 mg, Intravenous, Every 12 Hours Scheduled      pneumococcal polysaccharide 23-valent 25 MCG/0.5ML vaccine  Commonly known as: PNEUMOVAX-23   0.5 mL, Intramuscular, Once      polyethylene glycol 17 g packet  Commonly known as: MIRALAX   17 g, Oral, Daily      QUEtiapine 100 MG tablet  Commonly known as: SEROquel   100 mg, Per G Tube, Every 8 Hours Scheduled      sennosides 8.8 MG/5ML syrup  Commonly known as: SENOKOT   10 mL, Per G Tube, 2 Times Daily         Changes to Medications      Instructions Start Date   rOPINIRole 4 MG tablet  Commonly known as: REQUIP  What changed:   · medication strength  · how much to take  · how to take this   4 mg, Per G Tube, Nightly         Stop These Medications    albuterol sulfate  (90 Base) MCG/ACT inhaler  Commonly known as: PROVENTIL HFA;VENTOLIN HFA;PROAIR HFA  Replaced by: albuterol (2.5 MG/3ML) 0.083% nebulizer solution     calcium carbonate 600 MG tablet  Commonly known as: OS-LESIA     cetirizine 10 MG tablet  Commonly known as: zyrTEC     cholecalciferol 10 MCG (400 UNIT) tablet  Commonly known as: VITAMIN D3     fluticasone 220 MCG/ACT inhaler  Commonly known as: FLOVENT HFA     levothyroxine 125 MCG tablet  Commonly known as: SYNTHROID, LEVOTHROID  Replaced by: Levothyroxine Sodium 100 MCG/5ML solution injection     lisinopril 5 MG tablet  Commonly known as: PRINIVIL,ZESTRIL     Loperamide A-D 2 MG tablet  Generic drug: loperamide     Magnesium 250 MG tablet     metFORMIN 500 MG tablet  Commonly known as: GLUCOPHAGE     metroNIDAZOLE 500 MG tablet  Commonly known as: FLAGYL     theophylline 300 MG 12 hr tablet  Commonly  known as: THEODUR          Diet Instructions     Diet: Tube Feeding; Continuous; Peptamen AF @80 ml/hr, FW @ 5ml/hr      Discharge Diet: Tube Feeding    Feeding Type: Continuous    Formula & Rate: Peptamen AF @80 ml/hr, FW @ 5ml/hr        Activity Instructions     Activity as Tolerated          Follow-up Appointments  No future appointments.    Discharge Instructions:  Ok to go to [facility] at 9am on 2/7/21  Will need Pulmonary to assist with ventilator liberation  Restart TF gradually with lower goal rate  Will need ID to manage antibiotics  Will need PT/OT/SLP     BEVERLY Avitia, AGACNP-BC, FNP-BC  Pulmonary & Critical Care Medicine    Time: I spent  60  minutes on this discharge activity which included: face-to-face encounter with the patient, reviewing the data in the system, coordination of the care with the nursing staff as well as consultants, documentation, and entering orders.      CC: Mich Castro MD

## 2021-02-06 NOTE — PLAN OF CARE
Goal Outcome Evaluation:  Plan of Care Reviewed With: patient  Progress: no change  Outcome Summary: Pt continues to be agitated and restless. RR ranging from 30-60 despite fent gtt, scheduled and prn meds. ABG ordered around 0200 results were WDL. TF increased to 80ml/hr, residuals improving. Sodium trending up on am labs. UOP- 700 ml.

## 2021-02-06 NOTE — PLAN OF CARE
Goal Outcome Evaluation:  Plan of Care Reviewed With: patient  Progress: no change     Patient continues to have tachypnea. RR ranges from 31-61 on vent despite scheduled Rosalina 10 mg Q 4, Ativan 2 mg Q 4, and 100 mg Seroquel Q 8. Na 129 free H2O decreased to 5 mL Q 1. Plan for [facility] tomorrow.

## 2021-02-06 NOTE — PROGRESS NOTES
INFECTIOUS DISEASE progress Note    Mei Gunter  1945  [id number]    Date: 2/6/2021    Admission Date: 1/8/2021      Requesting Provider: Dmitry Hernandez MD  Evaluating Physician: Harry Tran III, MD    Reason for Consultation: COVID-19 acute respiratory failure     History of present illness:    Patient is a 76 y.o. female with h/o COPD, T2DM, hypothyroidism, and HTN who presented to [facility] ED on 1/8/21 with increasing shortness of breath, weakness, fever, and dark black diarrhea.  The patient had shortness of breath and cough that started about 10 days ago with fever about 5 days ago of greater than 100F.  She tested positive for COVID-19 on or about 1/4/21 and caught COVID-19 from her son.  She was placed on Keflex by her PCP for possible bronchitis.  She also had nausea, vomiting, diarrhea and was waiting for Cdiff toxin test result.  She was having up to 5 to 10 stools a day and towards the latter part of the diarrhea, it had become sticky and slimy.  She denies dysuria or rashes.  Work up showed Tmax 100.8, worsening hypoxia now requiring 70% FiO2 45L O2 by high flow oxygen, A1C 6.8, lactic acid 1.2, D-dimer 1.8-->1.52, WBC 5700 with 86% neutrophils, Ferritin 334-->334, CRP 19.1-->31.75, -->520, proBNP 492, PCT 0.13, , and creatinine 1.02. A respiratory panel PCR is positive for COVID-19.  Blood cultures are pending. A CXR shows diffuse multifocal airspace disease.  She is currently on Remdesivir, Dexamethasone, and Lovenox.  ID was asked to evaluate and manage her antimicrobial therapy.     1/10/2021 patient in transfer the intensive care unit because of worsening hypoxia is currently intubated and sedated review of systems are unobtainable  1/11/21; patient is critically ill on pressor, vent, sedated fio2 45%; ros unobtainable  1/12/21; no events overnight; off of pressors, on vent; on tube feeds; no fever, ros unobtainable  1/13/21; on increasing fio2, sedation, on vent, on pressors;  started on zosyn.  On intermittent paralytics    1/14/21; patient is critically ill on vent peep 14, low dose pressors, sedated ros unobtainable    1/15/2021 patient is on low-dose Levophed likely for decreased vascular tone from sedation; patient is not requiring paralytics; FiO2 is down to 40%; patient is afebrile and hemodynamically stable with the exception of low-dose hypotension.  Review of systems are unobtainable    1/18/2021 patient on FiO2 of 40% propofol turned off patient remains on fentanyl Versed opens eyes to noxious stimulation afebrile normotensive  1/19/21; patient remains on vent, peep at 8, tolerating tube feeds; sedated ros unobtainable; afebrile, hemodynamically stable.  1/20/21; has fever, elevation of wbc, good response to bumex yesterday; on fio2 50%; started on levophed drip for hypotension. Remains critically ill. Had left sided pneumothorax treated with chest tube (pigtail drain)    1/21/2021; having low-grade temperatures remains intubated sedated on fentanyl drip given Ativan as needed for agitation restlessness; review of systems are unobtainable patient remains critically ill    1/22/21; no events overnight; afebrile, patient now on versed, fentanyl, remains intubated on fio2 40%; ros unobtainable    1/23/2021 patient is critically ill afebrile hemodynamically stable remains on FiO2 sedation is difficult patient is receiving as needed Ativan patient has been on Bumex drip review of systems are unobtainable    1/24/21; patient remains critically ill; on vent, on dilaudid drip for sedation  abx changed to ceftazidime last night;   Has low grade fevers on fio2 40%.  Ros unobtainable    1/25/2021 patient now has right-sided pneumothorax had chest tube placed today patient sedated on ventilator is hemodynamically stable has low-grade temperatures review of systems are unobtainable    1/26/21; no events overnight; remains critically ill remains on vent, sedation, ros  unobtainable    1/27/21; patient remains critically ill; on vent; returned from the OR for trach and peg today; has been diuresed today patient sedated on FiO2 40% no distress has been off of airborne precautions now    1/28/2021 patient remains on Dilaudid drip plan is to decrease this sedation patient is on low-dose of Levophed.  Patient is confused not following commands patient remains on vent receiving blood products today    1/29/21 patient is off of Dilaudid drip is resting quietly on ventilator patient opens eyes to voice but cannot follow commands    2/1/21; patient has fever now; is on fio2 50% resting quietly; on dilaudid drip.    2/2/21; continues to have fever, otherwise hemodynamically stable; seroquel held; on vent;    2/3/2021; has fevers has been started on Zyprexa is on Dilaudid drip remains intubated patient is sedated    2/4/2021; patient remains on ventilator been having fevers is currently hemodynamically stable    2/5/21: Left Chest tube removed on 2/4/21. Doing well on trach on 60% FiO2/PEEP 5.  On Ceftazidime.  Sputum culture with Klebsiella aerogenes susc to Ceftazidime.     2/6/2021 afebrile and normal tensive is on fentanyl drip remains on ventilator  Review of systems unobtainable        Review of systems are unobtainable  Past Medical History:   Diagnosis Date   • Asthma    • COPD (chronic obstructive pulmonary disease) (CMS/HCC)    • Diabetes mellitus (CMS/HCC)    • Disease of thyroid gland    • Hypertension    • Restless leg syndrome    • Seasonal allergies        Past Surgical History:   Procedure Laterality Date   • BLADDER REPAIR     • CLAVICLE SURGERY     • HERNIA REPAIR     • HYSTERECTOMY     • RECTAL PROLAPSE REPAIR     • TRACHEOSTOMY AND PEG TUBE INSERTION N/A 1/27/2021    Procedure: TRACHEOSTOMY AND PERCUTANEOUS ENDOSCOPIC GASTROSTOMY TUBE INSERTION;  Surgeon: Eliud Dumont MD;  Location: [facility];  Service: General;  Laterality: N/A;   • TUBAL ABDOMINAL LIGATION          Family History   Problem Relation Age of Onset   • Diabetes Mother    • Asthma Mother    • Diabetes Father    • COPD Father        Social History     Socioeconomic History   • Marital status:      Spouse name: Not on file   • Number of children: Not on file   • Years of education: Not on file   • Highest education level: Not on file   Tobacco Use   • Smoking status: Never Smoker   • Smokeless tobacco: Never Used   Substance and Sexual Activity   • Alcohol use: Never     Frequency: Never   • Drug use: Never   • Sexual activity: Never       Allergies   Allergen Reactions   • Actonel [Risedronate] Myalgia   • Hydrocodone Nausea Only   • Levaquin [Levofloxacin] Myalgia   • Other Myalgia     Ebista: legs hurt   • Stadol [Butorphanol] Other (See Comments)     Lowers blood pressure           Medication:    Current Facility-Administered Medications:   •  [DISCONTINUED] acetaminophen (TYLENOL) tablet 650 mg, 650 mg, Oral, Q4H PRN, 650 mg at 01/08/21 2142 **OR** acetaminophen (TYLENOL) 160 MG/5ML solution 650 mg, 650 mg, Per G Tube, Q4H PRN, 650 mg at 02/02/21 2132 **OR** acetaminophen (TYLENOL) suppository 650 mg, 650 mg, Rectal, Q4H PRN, Inez Alonzo, PharmD, 650 mg at 02/03/21 0131  •  acetylcysteine (MUCOMYST) 20 % nebulizer solution 2 mL, 2 mL, Nebulization, BID - RT, Case, Aubrie V., DO, 2 mL at 02/06/21 0722  •  albuterol (PROVENTIL) nebulizer solution 0.083% 2.5 mg/3mL, 2.5 mg, Nebulization, Q6H - RT, Case, Aubrie V., DO, 2.5 mg at 02/06/21 0722  •  ALPRAZolam (XANAX) tablet 0.5 mg, 0.5 mg, Oral, TID PRN, Elo Todd MD, 0.5 mg at 02/06/21 0914  •  artificial tears ophthalmic ointment, , Both Eyes, Q1H PRN, Eliud Dumont MD, Given at 01/23/21 0319  •  bisacodyl (DULCOLAX) suppository 10 mg, 10 mg, Rectal, Daily PRN, Eliud Dumont MD, 10 mg at 02/03/21 5437  •  cefTAZidime (FORTAZ) 2 g/100 mL 0.9% NS IVPB (mpb), 2 g, Intravenous, Q8H, Aubrie Vincent DO, 2 g at 02/06/21 0605  •   chlorhexidine (PERIDEX) 0.12 % solution 15 mL, 15 mL, Mouth/Throat, Q12H, Eliud Dumont MD, 15 mL at 02/06/21 0829  •  dextrose (D50W) 25 g/ 50mL Intravenous Solution 25 g, 25 g, Intravenous, Q15 Min PRN, Eliud Dumont MD, 25 g at 02/01/21 1232  •  sennosides (SENOKOT) 8.8 MG/5ML syrup 10 mL, 10 mL, Per G Tube, BID, 10 mL at 02/06/21 0829 **AND** docusate sodium (COLACE) liquid 100 mg, 100 mg, Per G Tube, BID, Inez Alonzo, PharmD, 100 mg at 02/06/21 0829  •  enoxaparin (LOVENOX) syringe 110 mg, 1 mg/kg, Subcutaneous, Q12H, Vicki Samaniego, PharmD, 110 mg at 02/06/21 0830  •  fentaNYL 2500 mcg/250 mL NS infusion,  mcg/hr, Intravenous, Titrated, Case, Aubrie V., DO, Last Rate: 25 mL/hr at 02/06/21 1054, 250 mcg/hr at 02/06/21 1054  •  HYDROcodone-acetaminophen (NORCO) 5-325 MG per tablet 1 tablet, 1 tablet, Oral, Q6H PRN, Elo Todd MD, 1 tablet at 02/06/21 0831  •  influenza vac split quad (FLUZONE,FLUARIX,AFLURIA,FLULAVAL) injection 0.5 mL, 0.5 mL, Intramuscular, During Hospitalization, Eliud Dumont MD  •  insulin detemir (LEVEMIR) injection 10 Units, 10 Units, Subcutaneous, Q12H, Sandie West APRN, 10 Units at 02/06/21 0844  •  insulin regular (humuLIN R,novoLIN R) injection 0-24 Units, 0-24 Units, Subcutaneous, Q6H, Eliud Dumont MD, 8 Units at 02/06/21 1114  •  Levothyroxine Sodium injection 75 mcg, 75 mcg, Intravenous, Daily, Eliud Dumont MD, 75 mcg at 02/06/21 1058  •  LORazepam (ATIVAN) injection 0.5 mg, 0.5 mg, Intravenous, Q4H PRN, Case, Aubrie V., DO, 0.5 mg at 02/06/21 0914  •  LORazepam (ATIVAN) injection 2 mg, 2 mg, Intravenous, Q4H, Case, Aubrie V., DO, 2 mg at 02/06/21 1054  •  Magnesium Sulfate 2 gram Bolus, followed by 8 gram infusion (total Mg dose 10 grams)- Mg less than or equal to 1mg/dL, 2 g, Intravenous, PRN **OR** Magnesium Sulfate 2 gram / 50mL Infusion (GIVE X 3 BAGS TO EQUAL 6GM TOTAL DOSE) - Mg 1.1 - 1.5 mg/dl, 2 g, Intravenous, PRN **OR**  Magnesium Sulfate 4 gram infusion- Mg 1.6-1.9 mg/dL, 4 g, Intravenous, PRN, Eliud Dumont MD, Last Rate: 25 mL/hr at 02/03/21 0955, 4 g at 02/03/21 0955  •  midodrine (PROAMATINE) tablet 5 mg, 5 mg, Per G Tube, TID AC, Brett, Sandie W, APRN, 5 mg at 02/06/21 1114  •  nystatin (MYCOSTATIN) powder, , Topical, Q12H, Eliud Dumont MD, Given at 02/06/21 0830  •  ondansetron (ZOFRAN) tablet 4 mg, 4 mg, Per G Tube, Q6H PRN **OR** ondansetron (ZOFRAN) injection 4 mg, 4 mg, Intravenous, Q6H PRN, Inez Alonzo, PharmD  •  oxyCODONE (ROXICODONE) immediate release tablet 10 mg, 10 mg, Per G Tube, Q4H, Aubrie Vincent, , 10 mg at 02/06/21 1115  •  pantoprazole (PROTONIX) injection 40 mg, 40 mg, Intravenous, Q12H, Eliud Dumont MD, 40 mg at 02/06/21 0830  •  pneumococcal polysaccharide 23-valent (PNEUMOVAX-23) vaccine 0.5 mL, 0.5 mL, Intramuscular, During Hospitalization, Eliud Dumont MD  •  polyethylene glycol (MIRALAX) packet 17 g, 17 g, Per G Tube, Daily, Inez Alonzo, PharmD, 17 g at 02/06/21 0831  •  potassium chloride (KLOR-CON) packet 40 mEq, 40 mEq, Per PEG Tube, PRN, Inez Alonzo, PharmD  •  potassium phosphate 45 mmol in sodium chloride 0.9 % 250 mL infusion, 45 mmol, Intravenous, PRN **OR** potassium phosphate 30 mmol in sodium chloride 0.9 % 100 mL infusion, 30 mmol, Intravenous, PRN **OR** potassium phosphate 15 mmol in sodium chloride 0.9 % 100 mL infusion, 15 mmol, Intravenous, PRN **OR** sodium phosphates 45 mmol in sodium chloride 0.9 % 250 mL IVPB, 45 mmol, Intravenous, PRN **OR** sodium phosphates 30 mmol in sodium chloride 0.9 % 100 mL IVPB, 30 mmol, Intravenous, PRN, Last Rate: 25 mL/hr at 01/13/21 1254, 30 mmol at 01/13/21 1254 **OR** sodium phosphates 15 mmol in sodium chloride 0.9 % 100 mL IVPB, 15 mmol, Intravenous, PRN, Eliud Dumont MD, Last Rate: 50 mL/hr at 01/26/21 0916, 15 mmol at 01/26/21 0916  •  QUEtiapine (SEROquel) tablet 100 mg, 100 mg, Per G Tube, Q8H, Case, Aubrie  V., DO  •  rOPINIRole (REQUIP) tablet 4 mg, 4 mg, Per G Tube, Nightly, Andrea Burgos MD, 4 mg at 02/05/21 2136  •  sodium chloride 0.9 % flush 10 mL, 10 mL, Intravenous, PRN, Eliud Dumont MD, 10 mL at 02/06/21 0830    Antibiotics:  Anti-Infectives (From admission, onward)    Ordered     Dose/Rate Route Frequency Start Stop    02/03/21 0918  cefTAZidime (FORTAZ) 2 g/100 mL 0.9% NS IVPB (mpb)     Inez Alonzo, PharmD reviewed the order on 02/03/21 1151.   Ordering Provider: Case, Aubrie V., DO    2 g  over 30 Minutes Intravenous Every 8 Hours Scheduled 02/03/21 1015 02/10/21 1359    01/27/21 1439  valACYclovir (VALTREX) tablet 1,000 mg     Ordering Provider: Harry Tran MD    1,000 mg Per G Tube Every 12 Hours Scheduled 01/27/21 1441 01/27/21 2042    01/22/21 1022  micafungin 100 mg/100 mL 0.9% NS IVPB (mbp)     Lee Moreno, PharmD reviewed the order on 01/29/21 1427.   Ordering Provider: Harry Tran MD    100 mg  over 60 Minutes Intravenous Every 24 Hours 01/22/21 1115 02/01/21 1219    01/20/21 1457  cefepime (MAXIPIME) 2 g/100 mL 0.9% NS (mbp)     Ordering Provider: Harry Tran MD    2 g  200 mL/hr over 30 Minutes Intravenous Once 01/20/21 1545 01/20/21 1630    01/14/21 1345  levoFLOXacin (LEVAQUIN) 500 mg/100 mL D5W (premix) (LEVAQUIN) 500 mg     Cornelia Tompkins, PharmD reviewed the order on 01/15/21 0713.   Ordering Provider: Harry Tran MD    500 mg Intravenous Every 24 Hours 01/14/21 1500 01/20/21 1412    01/13/21 0917  piperacillin-tazobactam (ZOSYN) 3.375 g in iso-osmotic dextrose 50 ml (premix)     Ordering Provider: Adelso Bach MD    3.375 g  over 30 Minutes Intravenous Once 01/13/21 1015 01/13/21 1052    01/09/21 1402  remdesivir 100 mg in sodium chloride 0.9 % 250 mL IVPB (powder vial)     Cornelia Tompkins, PharmD reviewed the order on 01/15/21 7540.   Ordering Provider: Larry Hawkins PA    100 mg  over 60 Minutes Intravenous Every 24 Hours  01/10/21 1200 21 1229    21 1801  remdesivir 100 mg in sodium chloride 0.9 % 250 mL IVPB (powder vial)     Ordering Provider: Mena Ribeiro APRN    100 mg  over 60 Minutes Intravenous Every 24 Hours 21 1800 21 1912    21 1801  remdesivir 200 mg in sodium chloride 0.9 % 250 mL IVPB (powder vial)     Ordering Provider: Mena Ribeiro APRN    200 mg  over 60 Minutes Intravenous Every 24 Hours 21 2200 21 2242            Review of Systems:  See HPI      Physical Exam:   Vital Signs  Temp (24hrs), Av.1 °F (36.7 °C), Min:97.7 °F (36.5 °C), Max:98.7 °F (37.1 °C)    Temp  Min: 97.7 °F (36.5 °C)  Max: 98.7 °F (37.1 °C)  BP  Min: 83/61  Max: 153/78  Pulse  Min: 80  Max: 107  Resp  Min: 37  Max: 56  SpO2  Min: 89 %  Max: 100 %    GENERAL: Resting quietly on vent  fio2 50%   HEENT: Normocephalic, atraumatic.  No ext oral lesions  Tracheostomy present  HEART: Normal rate by monitor  LUNGS: symmetrical inspiration; on vent;   ABDOMEN: nondistended.  Nontender  Skin no joint deformity    SKIN: No rashes on exposed skin    Laboratory Data    Results from last 7 days   Lab Units 21  0254 21  0417 21  0330   WBC 10*3/mm3 9.76 8.90 7.57   HEMOGLOBIN g/dL 7.1* 7.3* 7.4*   HEMATOCRIT % 23.0* 23.8* 24.5*   PLATELETS 10*3/mm3 339 336 346     Results from last 7 days   Lab Units 21  0254   SODIUM mmol/L 129*   POTASSIUM mmol/L 4.7   CHLORIDE mmol/L 100   CO2 mmol/L 23.0   BUN mg/dL 23   CREATININE mg/dL 0.49*   GLUCOSE mg/dL 179*   CALCIUM mg/dL 9.2     Results from last 7 days   Lab Units 21  0254   ALK PHOS U/L 66   BILIRUBIN mg/dL 0.2   ALT (SGPT) U/L 10   AST (SGOT) U/L 13         Results from last 7 days   Lab Units 21  0254   CRP mg/dL 26.35*         Results from last 7 days   Lab Units 21  0254 21  0417 21  0330   CK TOTAL U/L 15* 24 38         Estimated Creatinine Clearance: 74.7 mL/min (A) (by C-G formula based on SCr of 0.49 mg/dL  (L)).      Microbiology:  Microbiology Results (last 10 days)     Procedure Component Value - Date/Time    Respiratory Culture - Aspirate, Bronchus [[id number]]  (Abnormal)  (Susceptibility) Collected: 02/03/21 1156    Lab Status: Final result Specimen: Aspirate from Bronchus Updated: 02/06/21 1021     Respiratory Culture Moderate growth (3+) Klebsiella aerogenes      Heavy growth (4+) Stenotrophomonas maltophilia      No Normal Respiratory Avelina     Gram Stain Few (2+) WBCs per low power field      Rare (1+) Epithelial cells per low power field      Rare (1+) Gram negative bacilli    Narrative:            Susceptibility      Klebsiella aerogenes     EFRAIN     Cefepime Susceptible     Ceftazidime Susceptible     Ceftriaxone Susceptible     Gentamicin Susceptible     Levofloxacin Susceptible     Piperacillin + Tazobactam Susceptible     Tetracycline Susceptible     Trimethoprim + Sulfamethoxazole Susceptible                Susceptibility      Stenotrophomonas maltophilia     EFRAIN     Levofloxacin Resistant     Trimethoprim + Sulfamethoxazole Susceptible                Susceptibility Comments     Klebsiella aerogenes    Cefazolin sensitivity will not be reported for Enterobacteriaceae in non-urine isolates. If cefazolin is preferred, please call the microbiology lab to request an E-test.  With the exception of urinary-sourced infections, aminoglycosides should not be used as monotherapy.             Blood Culture - Blood, Arm, Left [[id number]] Collected: 02/03/21 1049    Lab Status: Preliminary result Specimen: Blood from Arm, Left Updated: 02/05/21 1131     Blood Culture No growth at 2 days                Radiology:  Imaging Results (Last 72 Hours)     Procedure Component Value Units Date/Time    XR Chest 1 View [[id number]] Collected: 02/06/21 0805     Updated: 02/06/21 0806    Narrative:         EXAMINATION: XR CHEST 1 VW-      INDICATION: resp failure; J96.01-Acute respiratory failure with hypoxia;  U07.1-COVID-19;  J12.82-Pneumonia due to Coronavirus disease 2019;  J93.9-Pneumothorax, unspecified; J93.9-Pneumothorax, unspecified      COMPARISON: 02/05/2021     FINDINGS: Portable chest reveals heart to be enlarged. Increased  markings seen diffusely throughout the lung fields bilaterally.  Tracheostomy tube in satisfactory position above the frankie. PICC line  catheter remains on the right. The markings have slightly worsened in  the interval. There is worsening of the aeration of the lung fields.           Impression:      Worsening of the aeration the lung fields in the interval  with slight worsening of the airspace disease. Tracheostomy tube remains  in satisfactory position.          XR Chest 1 View [[id number]] Collected: 02/05/21 0903     Updated: 02/05/21 2254    Narrative:      EXAMINATION: XR CHEST 1 VW-02/05/2021:     INDICATION: Respiratory failure; J96.01-Acute respiratory failure with  hypoxia; U07.1-COVID-19; J12.82-Pneumonia due to Coronavirus disease  2019; J93.9-Pneumothorax, unspecified; J93.9-Pneumothorax, unspecified.     COMPARISON: 02/04/2021.     FINDINGS: Left-sided pigtail catheter has been removed. No pneumothorax  or significant effusion is seen. The coarse diffuse interstitial disease  pattern of the lungs seen on yesterday's study is stable. The heart  appears upper normal size.       Impression:      Left pigtail catheter removal with no evidence of  pneumothorax or effusion. Extensive but stable bilateral pulmonary  interstitial disease.      D:  02/05/2021  E:  02/05/2021     This report was finalized on 2/5/2021 10:51 PM by Dr. Azam Villaseñor MD.       XR Chest 1 View [[id number]] Collected: 02/04/21 0821     Updated: 02/04/21 2204    Narrative:      EXAMINATION: XR CHEST 1 VW- 02/04/2021     INDICATION: J96.01-Acute respiratory failure with hypoxia;  U07.1-COVID-19; J12.82-Pneumonia due to Coronavirus disease 2019;  J93.9-Pneumothorax, unspecified; J93.9-Pneumothorax, unspecified     COMPARISON:  02/03/2021     FINDINGS: Left pigtail drain remains in place. Tracheostomy tube and  PICC line are again noted. Heart shadow is upper limits of normal size.  Coarse bilateral pulmonary interstitial disease is greater on the right  than left, but improved from the prior study. No effusion or  pneumothorax is seen.       Impression:      Persistent but improving bilateral pulmonary interstitial  disease, compared to 02/03/2021 exam.       D:  02/04/2021  E:  02/04/2021     This report was finalized on 2/4/2021 10:01 PM by Dr. Azam Villaseñor MD.       XR Chest 1 View [[id number]] Collected: 02/04/21 1331     Updated: 02/04/21 2151    Narrative:      EXAMINATION: XR CHEST 1 VW-02/04/2021:     INDICATION: Left chest tube clamped; J96.01-Acute respiratory failure  with hypoxia; U07.1-COVID-19; J12.82-Pneumonia due to Coronavirus  disease 2019; J93.9-Pneumothorax, unspecified; J93.9-Pneumothorax,  unspecified.     COMPARISON: 02/04/2021.     FINDINGS: Left-sided pigtail catheter remains in place. No pneumothorax  is seen. PICC line remains in the mid SVC. Diffuse pulmonary  interstitial disease remains relatively extensive but stable. No new  chest disease is seen.       Impression:      Stable pulmonary interstitial disease. No evidence of  pneumothorax or new chest pathology is seen.     D:  02/04/2021  E:  02/04/2021            This report was finalized on 2/4/2021 9:48 PM by Dr. Azam Villaseñor MD.           Estimated Creatinine Clearance: 74.7 mL/min (A) (by C-G formula based on SCr of 0.49 mg/dL (L)).    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    Patient given multiple course of antibiotics including Zosyn.  Levaquin.  Ceftazidime.  And vancomycin.      Patient cannot be weaned off ventilator will likely have recurrent pneumonia.      Observe of antibiotics for the time being given patient is hemodynamically stable    Repeat sputum cultures from 2/3 are growing Klebsiella erogenous and stenotrophomonas; both isolates are apparently susceptible to Bactrim    If patient has fever would start IV Bactrim    Develop antibiotics for the time being    Harry Tran MD  2/6/2021  11:30 EST

## 2021-02-07 NOTE — PROGRESS NOTES
Case Management Discharge Note      Final Note: Patient transferring to [facility] today at 0900.  Nurse to call report to [phone].  Transfer summary to be faxed to [phone] when available.  Patient has scheduled [facility] transportion for 0900 today.  [facility] on patient chartlet         Selected Continued Care - Admitted Since 1/8/2021     Destination Coordination complete    Service Provider Selected Services Address Phone Fax Patient Preferred    [facility] - [address] [address] [phone] --          Durable Medical Equipment    No services have been selected for the patient.              Dialysis/Infusion    No services have been selected for the patient.              Home Medical Care    No services have been selected for the patient.              Therapy    No services have been selected for the patient.              Community Resources    No services have been selected for the patient.                       Final Discharge Disposition Code: 63 - [facility]

## 2021-02-07 NOTE — DISCHARGE PLACEMENT REQUEST
"Garcia Gunter (76 y.o. Female)     Date of Birth Social Security Number Address Home Phone MRN    1945  [id number] [facility] [address] [phone] [id number]    Buddhist Marital Status          None        Admission Date Admission Type Admitting Provider Attending Provider Department, Room/Bed    1/8/21 Emergency Adelso Bach MD Case, Theresa V., DO [facility] 2A ICU, N202/1    Discharge Date Discharge Disposition Discharge Destination         Rehab Facility or Unit (DC - External)              Attending Provider: Aubrie Vincent DO    Allergies: Actonel [Risedronate], Hydrocodone, Levaquin [Levofloxacin], Other, Stadol [Butorphanol]    Isolation: Droplet   Infection: COVID (History) (01/26/21)   Code Status: CPR    Ht: 167.6 cm (66\")   Wt: 112 kg (247 lb)    Admission Cmt: None   Principal Problem: Acute respiratory failure with hypoxia, status post tracheostomy on 1/27/2021 [J96.01]                 Active Insurance as of 1/8/2021     Primary Coverage     Payor Plan Insurance Group Employer/Plan Group    HUMANA MEDICARE REPLACEMENT HUMANA MEDICARE REPLACEMENT [id number]     Payor Plan Address Payor Plan Phone Number Payor Plan Fax Number Effective Dates    [address] [address] [phone]  1/1/2020 - None Entered    [facility] [address]       Subscriber Name Subscriber Birth Date Member ID       TRACI GUNTERLEAH SANCHEZ 1945 [id number]                 Emergency Contacts      (Rel.) Home Phone Work Phone Mobile Phone    SANTI PALOMINO (Daughter) [phone] -- --    LyricSeun (Son) -- -- [phone]              Current Facility-Administered Medications   Medication Dose Route Frequency Provider Last Rate Last Admin   • acetaminophen (TYLENOL) 160 MG/5ML solution 650 mg  650 mg Per G Tube Q4H PRN Inez Alonzo, PharmD   650 mg at 02/02/21 2132    Or   • acetaminophen (TYLENOL) suppository 650 mg  650 mg Rectal Q4H PRN Inez Alonzo, PharmD   650 mg at " 02/03/21 0131   • acetylcysteine (MUCOMYST) 20 % nebulizer solution 2 mL  2 mL Nebulization BID - RT Case, Aubrie V., DO   2 mL at 02/07/21 0742   • albuterol (PROVENTIL) nebulizer solution 0.083% 2.5 mg/3mL  2.5 mg Nebulization Q6H - RT Case, Aubrie V., DO   2.5 mg at 02/07/21 0741   • ALPRAZolam (XANAX) tablet 0.5 mg  0.5 mg Oral TID PRN Elo Todd MD   0.5 mg at 02/06/21 0914   • artificial tears ophthalmic ointment   Both Eyes Q1H PRN Eliud Dumont MD   Given at 01/23/21 0319   • bisacodyl (DULCOLAX) suppository 10 mg  10 mg Rectal Daily PRN Eliud Dumont MD   10 mg at 02/03/21 1658   • chlorhexidine (PERIDEX) 0.12 % solution 15 mL  15 mL Mouth/Throat Q12H Eliud Dumont MD   15 mL at 02/07/21 0825   • dextrose (D50W) 25 g/ 50mL Intravenous Solution 25 g  25 g Intravenous Q15 Min PRN Eliud Dumont MD   25 g at 02/01/21 1232   • sennosides (SENOKOT) 8.8 MG/5ML syrup 10 mL  10 mL Per G Tube BID Inez Alonzo, PharmD   10 mL at 02/07/21 0824    And   • docusate sodium (COLACE) liquid 100 mg  100 mg Per G Tube BID Inez Alonoz PharmD   100 mg at 02/07/21 0825   • enoxaparin (LOVENOX) syringe 110 mg  1 mg/kg Subcutaneous Q12H Vicki Samaniego PharmD   110 mg at 02/07/21 0825   • fentaNYL 2500 mcg/250 mL NS infusion   mcg/hr Intravenous Titrated Case, Aubrie V., DO 20 mL/hr at 02/07/21 0649 200 mcg/hr at 02/07/21 0649   • HYDROcodone-acetaminophen (NORCO) 5-325 MG per tablet 1 tablet  1 tablet Oral Q6H PRN Elo Todd MD   1 tablet at 02/06/21 0831   • influenza vac split quad (FLUZONE,FLUARIX,AFLURIA,FLULAVAL) injection 0.5 mL  0.5 mL Intramuscular During Hospitalization Eliud Dumont MD       • insulin detemir (LEVEMIR) injection 10 Units  10 Units Subcutaneous Q12H Sandie West, APRN   10 Units at 02/07/21 0826   • insulin regular (humuLIN R,novoLIN R) injection 0-24 Units  0-24 Units Subcutaneous Q6H Eliud Dumont MD   8 Units at 02/06/21 1114   •  Levothyroxine Sodium injection 75 mcg  75 mcg Intravenous Daily Eliud Dumont MD   75 mcg at 02/06/21 1058   • LORazepam (ATIVAN) injection 0.5 mg  0.5 mg Intravenous Q4H PRN CaseAubrie, DO   0.5 mg at 02/06/21 0914   • LORazepam (ATIVAN) injection 2 mg  2 mg Intravenous Q4H CaseAubrie., DO   2 mg at 02/07/21 0625   • Magnesium Sulfate 2 gram Bolus, followed by 8 gram infusion (total Mg dose 10 grams)- Mg less than or equal to 1mg/dL  2 g Intravenous PRN Eliud Dumont MD        Or   • Magnesium Sulfate 2 gram / 50mL Infusion (GIVE X 3 BAGS TO EQUAL 6GM TOTAL DOSE) - Mg 1.1 - 1.5 mg/dl  2 g Intravenous PRN Eliud Dumont MD        Or   • Magnesium Sulfate 4 gram infusion- Mg 1.6-1.9 mg/dL  4 g Intravenous PRN Eliud Dumont MD 25 mL/hr at 02/03/21 0955 4 g at 02/03/21 0955   • midodrine (PROAMATINE) tablet 5 mg  5 mg Per G Tube TID AC Sandie West APRN   5 mg at 02/06/21 1640   • nystatin (MYCOSTATIN) powder   Topical Q12H Eliud Dumont MD   Given at 02/07/21 0849   • ondansetron (ZOFRAN) tablet 4 mg  4 mg Per G Tube Q6H PRN Inez Alonzo, PharmD        Or   • ondansetron (ZOFRAN) injection 4 mg  4 mg Intravenous Q6H PRN Inez Alonzo, PharmD       • oxyCODONE (ROXICODONE) immediate release tablet 10 mg  10 mg Per G Tube Q4H CaseAubrie, DO   10 mg at 02/07/21 0826   • pantoprazole (PROTONIX) injection 40 mg  40 mg Intravenous Q12H Eliud Dumont MD   40 mg at 02/07/21 0825   • pneumococcal polysaccharide 23-valent (PNEUMOVAX-23) vaccine 0.5 mL  0.5 mL Intramuscular During Hospitalization Eliud Dumont MD       • polyethylene glycol (MIRALAX) packet 17 g  17 g Per G Tube Daily Inez Alonzo, PharmD   17 g at 02/07/21 0849   • potassium chloride (KLOR-CON) packet 40 mEq  40 mEq Per PEG Tube PRN Inez Alonzo, PharmD       • potassium phosphate 45 mmol in sodium chloride 0.9 % 250 mL infusion  45 mmol Intravenous PRN Eliud Dumont MD        Or   • potassium phosphate 30  mmol in sodium chloride 0.9 % 100 mL infusion  30 mmol Intravenous PRN Eliud Dumont MD        Or   • potassium phosphate 15 mmol in sodium chloride 0.9 % 100 mL infusion  15 mmol Intravenous PRN Eliud Dumnot MD        Or   • sodium phosphates 45 mmol in sodium chloride 0.9 % 250 mL IVPB  45 mmol Intravenous PRN Eliud Dumont MD        Or   • sodium phosphates 30 mmol in sodium chloride 0.9 % 100 mL IVPB  30 mmol Intravenous PRN Eliud Dumont MD 25 mL/hr at 01/13/21 1254 30 mmol at 01/13/21 1254    Or   • sodium phosphates 15 mmol in sodium chloride 0.9 % 100 mL IVPB  15 mmol Intravenous PRN Eliud Dumont MD 50 mL/hr at 01/26/21 0916 15 mmol at 01/26/21 0916   • QUEtiapine (SEROquel) tablet 100 mg  100 mg Per G Tube Q8H Case, Aurbie VJaswinder, DO   100 mg at 02/07/21 0625   • rOPINIRole (REQUIP) tablet 4 mg  4 mg Per G Tube Nightly Andrea Burgos MD   4 mg at 02/06/21 2121   • sodium chloride 0.9 % flush 10 mL  10 mL Intravenous PRN Eliud Dumont MD   10 mL at 02/06/21 2122

## 2021-02-07 NOTE — DISCHARGE PLACEMENT REQUEST
"Garcia Gunter (76 y.o. Female)     Date of Birth Social Security Number Address Home Phone MRN    1945  [address] [facility] [address] [phone] [id number]    Buddhism Marital Status          None        Admission Date Admission Type Admitting Provider Attending Provider Department, Room/Bed    21 Emergency Adelso Bach MD Case, Theresa V., DO [facility] 2A ICU, N202/1    Discharge Date Discharge Disposition Discharge Destination         Rehab Facility or Unit (DC - External)              Attending Provider: Aubrie Vincent DO    Allergies: Actonel [Risedronate], Hydrocodone, Levaquin [Levofloxacin], Other, Stadol [Butorphanol]    Isolation: Droplet   Infection: COVID (History) (21)   Code Status: CPR    Ht: 167.6 cm (66\")   Wt: 112 kg (247 lb)    Admission Cmt: None   Principal Problem: Acute respiratory failure with hypoxia, status post tracheostomy on 2021 [J96.01]                 Active Insurance as of 2021     Primary Coverage     Payor Plan Insurance Group Employer/Plan Group    HUMANA MEDICARE REPLACEMENT HUMANA MEDICARE REPLACEMENT [id number]     Payor Plan Address Payor Plan Phone Number Payor Plan Fax Number Effective Dates    [address] [address] [phone]  2020 - None Entered    [facility] [address]       Subscriber Name Subscriber Birth Date Member ID       GARCIA GUNTER 1945 [id number]                 Emergency Contacts      (Rel.) Home Phone Work Phone Mobile Phone    GEORGETTESANTI (Daughter) [phone] -- --    Seun Gunter (Son) -- -- [phone]               Discharge Summary      Anabela Herr APRN at 21 1552          Discharge Summary    Patient name: Garcia Gunter  CSN: [id number]  MRN: [id number]  : 1945  Today's date: 2021     Date of Admission: 2021  Date of Discharge:  2021    Admitting Physician:  Dr. Monahan  Primary Care Provider: Gloria, " Mich Landin MD  Consultations:   ID:  Dr. Tran  Gen Surgery: Dr. Dumont  Neurology:  Dr. Don     Admission Diagnosis:  Acute Respiratory Failure     Discharge Diagnoses:   Active Hospital Problems    Diagnosis   • **Acute respiratory failure with hypoxia, status post tracheostomy on 2021   • Metabolic encephalopathy, likely multifactorial   • Pneumothorax, bilateral   • COVID-19 virus detected   • Disease of thyroid gland   • Diabetes mellitus (CMS/HCC)   • Hypertension     Allergies:  Actonel [risedronate], Hydrocodone, Levaquin [levofloxacin], Other, and Stadol [butorphanol]    Code Status and Medical Interventions:   Ordered at: 21 2712     Level Of Support Discussed With:    Patient     Code Status:    CPR     Medical Interventions (Level of Support Prior to Arrest):    Full     Procedures/Testin21 BLE venous duplex - Normal bilateral lower extremity venous duplex scan    21 Left chest tube insertion, discontinued 21 Right chest tube insertion, discontinued 21 TTE - Left ventricular systolic function is normal. Left ventricular ejection fraction appears to be 66 - 70%.  Left ventricular diastolic function is consistent with (grade Ia w/high LAP) impaired relaxation.  Left ventricular outflow tract peak flow gradient at rest is 25 mmHg.  The right ventricular cavity is borderline dilated.  The right atrial cavity is mildly dilated.  There is calcification of the aortic valve.  Moderate aortic valve stenosis is present.  Moderate aortic valve regurgitation is present.  Mitral annular calcification is present.  Mild to moderate mitral valve stenosis is present.  Mild mitral valve regurgitation is present.  There is mild pulmonic valve regurgitation present.  Moderate tricuspid valve regurgitation is present.  Estimated right ventricular systolic pressure from tricuspid regurgitation is markedly elevated (>55 mmHg).    21  Trach and PEG placement      1/29/21  EEG - Moderate generalized slow.  No epileptiform activity is seen    2/3/21 BLE venous duplex - Acute left lower extremity deep vein thrombosis noted in the peroneal and gastrocnemius    History of Present Illness:  Mei Gunter is a 75 y.o. female with PMH significant for COPD, asthma, diabetes, hypothyroidism, hypertension, restless leg syndrome who presented to [facility] ED on 1/8/2021 with increased shortness of breath, weakness, fever, dark black diarrhea.  Patient's onset of symptoms started approximately 10 days ago with cough and dyspnea followed the next day 5 fever.  Patient went to her PCP and was found to be Covid positive.  Patient felt mildly unwell for several days and then began having dark diarrhea, generalized weakness and increased dyspnea.  Patient was seen at [facility] over the last 2 days and was sent home, so she called her PCP who asked her to come to [facility].  Upon arrival in the ED, patient's room air oxygen saturation was 83%, which improved with supplemental oxygen.  Patient will be admitted to hospital medicine service for further evaluation and treatment.     Started with sinus type symptoms, then mildly productive cough, soa, fever 101F about 10 days ago.  Note that she had some black diarrhea that started same time as well, was given imodium by PCP a few days ago and has resolved.  Has never had an EGD or colonoscopy.  Not sticky.    Hospital Course:  While on the floor, she became increasingly hypoxic requiring 100% NRB to sats in upper 80s.  She was transferred to the ICU and urgently intubated.  She was continued on Decadron, Remdesivir and Baricitinib.  She was also given Actemra x1 1/10/21.  ID was consulted to manage her antibiotics.  She had a heme + stool, but H/H remained stable.  She was started on intermediate dosing of Lovenox and PPI BID.  BLE venous duplex was negative. Her oxygenation worsened and she was treated for asthma exacerbation  "and secondary pneumonia with Zosyn/LVQ.   Sputum was + Klebsiella and Stenotrophomonas.  She developed neuro changes and underwent CTA H/N revealed no LVO, but irregularities in the right MCA territory of at least moderate stenosis.  CTH that revealed no acute changes but large bilateral mastoid effusion w/total opacifications.  She has continued to have agitation with encephalopathy.  CTP was unremarkable.  She developed a left PTX and chest tube was placed 1/20 and removed 2/4.  She then developed a right PTX and had a chest tube placed 1/25 and it was removed 2/1.  She had an ECHO with results above.  She required prolonged mechanical ventilation due to increased secretions, hypoxia and encephalopathy.  She underwent a trach and PEG.  She had an EEG with results above.  She continues to be failure to wean from the ventilator due to agitation despite Seroquel, Ativan, Oxycodone and Fentanyl drip.  She has had intermittent fevers.  She was placed on Fortaz for trach site drainage for 7 days, which was started on 2/3.  Repeat sputum + Klebsiella and Stenotrophomonas.  ID recommended starting IV Bactrim if she continued to have fevers since both organisms are susceptible to it.  She also had another BLE venous duplex that was + DVT in left peroneal and gastrocnemius.  She is receiving therapeutic Lovenox q 12 hours.  She has had intermittent high residual requiring TF to be held.  Her TF is held once again this morning and g-tube placed to gravity.  We recommend gradually restarting her TF back with a possible lower goal rate initially.  It is felt at this time that she is ready for transfer to LTAC for continued ventilator weaning and rehab.       Vitals:  /52   Pulse 99   Temp 97.3 °F (36.3 °C) (Axillary)   Resp (!) 42   Ht 167.6 cm (66\")   Wt 112 kg (247 lb)   SpO2 96%   BMI 39.87 kg/m²     Physical Exam:  Constitutional:  Appears well-developed and well-nourished. No distress.   HEENT:  Normocephalic " and atraumatic. PERRL  Neck:  Neck supple. No JVD present.  Trach ok   CV: Normal rate, regular rhythm,  intact distal pulses.  No gallop, murmur or rub.  Pulmonary/Chest: Effort normal and breath sounds normal. No respiratory distress. No wheezes, rhonchi or rales.   Abdominal: Soft. +BS. No distension and no mass. There is no tenderness.  G tube to gravity.  Musculoskeletal: She does not follow commands.  Neurological:  Opens eyes and moves extremities spontaneously  Skin: Skin is warm and dry. No rash noted.   Extremities:  No clubbing, edema or cyanosis.  RUE PICC ok    Labs:  Results from last 7 days   Lab Units 02/07/21  0315   WBC 10*3/mm3 11.81*   HEMOGLOBIN g/dL 7.3*   HEMATOCRIT % 24.3*   PLATELETS 10*3/mm3 375     Results from last 7 days   Lab Units 02/07/21  0315   SODIUM mmol/L 133*   POTASSIUM mmol/L 5.4*   CHLORIDE mmol/L 101   CO2 mmol/L 25.0   BUN mg/dL 30*   CREATININE mg/dL 0.59   CALCIUM mg/dL 9.6   BILIRUBIN mg/dL <0.2   ALK PHOS U/L 65   ALT (SGPT) U/L 10   AST (SGOT) U/L 15   GLUCOSE mg/dL 129*         Magnesium   Date Value Ref Range Status   02/07/2021 2.0 1.6 - 2.4 mg/dL Final   02/06/2021 1.9 1.6 - 2.4 mg/dL Final   02/05/2021 2.0 1.6 - 2.4 mg/dL Final     Phosphorus   Date Value Ref Range Status   02/07/2021 4.0 2.5 - 4.5 mg/dL Final   02/06/2021 2.4 (L) 2.5 - 4.5 mg/dL Final   02/05/2021 2.8 2.5 - 4.5 mg/dL Final              Discharge Medications      New Medications      Instructions Start Date   acetaminophen 160 MG/5ML solution  Commonly known as: TYLENOL   650 mg, Per G Tube, Every 4 Hours PRN      acetylcysteine 20 % nebulizer solution  Commonly known as: MUCOMYST   2 mL, Nebulization, 2 Times Daily - RT      albuterol (2.5 MG/3ML) 0.083% nebulizer solution  Commonly known as: PROVENTIL  Replaces: albuterol sulfate  (90 Base) MCG/ACT inhaler   2.5 mg, Nebulization, Every 6 Hours - RT      ALPRAZolam 0.5 MG tablet  Commonly known as: XANAX   0.5 mg, Oral, 3 Times Daily PRN       artificial tears ophthalmic ointment   1 application, Both Eyes, Every 1 Hour PRN      bisacodyl 10 MG suppository  Commonly known as: DULCOLAX   10 mg, Rectal, Daily PRN      chlorhexidine 0.12 % solution  Commonly known as: PERIDEX   15 mL, Mouth/Throat, Every 12 Hours Scheduled      docusate sodium 150 MG/15ML liquid  Commonly known as: COLACE   100 mg, Per G Tube, 2 Times Daily      enoxaparin 120 MG/0.8ML solution syringe  Commonly known as: LOVENOX   1 mg/kg (110 mg), Subcutaneous, Every 12 Hours      fentaNYL 10 mcg/1 mL NS    mcg/hr ( mcg/hr), Intravenous, Titrated      HYDROcodone-acetaminophen 5-325 MG per tablet  Commonly known as: NORCO   1 tablet, Oral, Every 6 Hours PRN      influenza vac split quad 0.5 ML suspension prefilled syringe injection  Commonly known as: FLUZONE,FLUARIX,AFLURIA,FLULAVAL   0.5 mL, Intramuscular, Once      insulin detemir 100 UNIT/ML injection  Commonly known as: LEVEMIR   10 Units, Subcutaneous, Every 12 Hours Scheduled      insulin regular 100 UNIT/ML injection  Commonly known as: humuLIN R,novoLIN R   0-24 Units, Subcutaneous, Every 6 Hours Scheduled      Levothyroxine Sodium 100 MCG/5ML solution injection  Replaces: levothyroxine 125 MCG tablet   75 mcg, Intravenous, Daily at 1100      LORazepam 2 MG/ML injection  Commonly known as: ATIVAN   2 mg, Intravenous, Every 4 Hours      LORazepam 2 MG/ML injection  Commonly known as: ATIVAN   0.5 mg, Intravenous, Every 4 Hours PRN      midodrine 5 MG tablet  Commonly known as: PROAMATINE   5 mg, Per G Tube, 3 Times Daily Before Meals      nystatin 037627 UNIT/GM powder  Commonly known as: MYCOSTATIN   Topical, Every 12 Hours Scheduled      ondansetron 4 MG/2ML injection  Commonly known as: ZOFRAN   4 mg, Intravenous, Every 6 Hours PRN      oxyCODONE 10 MG tablet  Commonly known as: ROXICODONE   10 mg, Per G Tube, Every 4 Hours Scheduled      pantoprazole 40 MG injection  Commonly known as: PROTONIX   40 mg,  Intravenous, Every 12 Hours Scheduled      pneumococcal polysaccharide 23-valent 25 MCG/0.5ML vaccine  Commonly known as: PNEUMOVAX-23   0.5 mL, Intramuscular, Once      polyethylene glycol 17 g packet  Commonly known as: MIRALAX   17 g, Oral, Daily      QUEtiapine 100 MG tablet  Commonly known as: SEROquel   100 mg, Per G Tube, Every 8 Hours Scheduled      sennosides 8.8 MG/5ML syrup  Commonly known as: SENOKOT   10 mL, Per G Tube, 2 Times Daily         Changes to Medications      Instructions Start Date   rOPINIRole 4 MG tablet  Commonly known as: REQUIP  What changed:   · medication strength  · how much to take  · how to take this   4 mg, Per G Tube, Nightly         Stop These Medications    albuterol sulfate  (90 Base) MCG/ACT inhaler  Commonly known as: PROVENTIL HFA;VENTOLIN HFA;PROAIR HFA  Replaced by: albuterol (2.5 MG/3ML) 0.083% nebulizer solution     calcium carbonate 600 MG tablet  Commonly known as: OS-LESIA     cetirizine 10 MG tablet  Commonly known as: zyrTEC     cholecalciferol 10 MCG (400 UNIT) tablet  Commonly known as: VITAMIN D3     fluticasone 220 MCG/ACT inhaler  Commonly known as: FLOVENT HFA     levothyroxine 125 MCG tablet  Commonly known as: SYNTHROID, LEVOTHROID  Replaced by: Levothyroxine Sodium 100 MCG/5ML solution injection     lisinopril 5 MG tablet  Commonly known as: PRINIVIL,ZESTRIL     Loperamide A-D 2 MG tablet  Generic drug: loperamide     Magnesium 250 MG tablet     metFORMIN 500 MG tablet  Commonly known as: GLUCOPHAGE     metroNIDAZOLE 500 MG tablet  Commonly known as: FLAGYL     theophylline 300 MG 12 hr tablet  Commonly known as: THEODUR          Diet Instructions     Diet: Tube Feeding; Continuous; Peptamen AF @80 ml/hr, FW @ 5ml/hr      Discharge Diet: Tube Feeding    Feeding Type: Continuous    Formula & Rate: Peptamen AF @80 ml/hr, FW @ 5ml/hr        Activity Instructions     Activity as Tolerated          Follow-up Appointments  No future  appointments.    Discharge Instructions:  Ok to go to [facility] at 9am on 2/7/21  Will need Pulmonary to assist with ventilator liberation  Restart TF gradually with lower goal rate  Will need ID to manage antibiotics  Will need PT/OT/SLP     BEVERLY Avitia, AGACNP-BC, FNP-BC  Pulmonary & Critical Care Medicine    Time: I spent  60  minutes on this discharge activity which included: face-to-face encounter with the patient, reviewing the data in the system, coordination of the care with the nursing staff as well as consultants, documentation, and entering orders.      CC: Mich Castro MD        Electronically signed by Anabela Herr APRN at 02/07/21 0876

## 2021-02-07 NOTE — PLAN OF CARE
Goal Outcome Evaluation:  Plan of Care Reviewed With: patient  Progress: declining  Outcome Summary: PT seemed to be less restless this shift although RR remains between 30-60. TF residuals >250 early this am. TF placed on hold for remainder of shift due to high residuals along with TF found to be leaking around peg site. I&O cath'd this am, output 150 ml + 2 large occurrences. H&H trending up on am labs. PT possibly being transferred to [facility] around 0900.

## 2021-02-08 PROBLEM — R57.9 SHOCK (HCC): Status: ACTIVE | Noted: 2021-01-01

## 2021-02-08 PROBLEM — J96.20 ACUTE ON CHRONIC RESPIRATORY FAILURE (HCC): Status: ACTIVE | Noted: 2021-01-01

## 2021-02-08 PROBLEM — N17.9 AKI (ACUTE KIDNEY INJURY) (HCC): Status: ACTIVE | Noted: 2021-01-01

## 2021-02-13 LAB
BACTERIA SPEC AEROBE CULT: NORMAL
BACTERIA SPEC AEROBE CULT: NORMAL

## 2021-02-14 LAB
BACTERIA SPEC RESP CULT: ABNORMAL
GRAM STN SPEC: ABNORMAL

## (undated) DEVICE — Device

## (undated) DEVICE — GLV SURG SENSICARE PI MIC PF SZ7 LF STRL

## (undated) DEVICE — SUT SILK 3/0 SH CR8 18IN C013D

## (undated) DEVICE — HDRST POSTIN FM CRDL TRACH SLOT 9X8X4IN

## (undated) DEVICE — AIR AND WATER TUBING/CAP SET FOR OLYMPUS® SCOPES: Brand: ERBE

## (undated) DEVICE — SYR LL TP 10ML STRL

## (undated) DEVICE — SUT ETHLN 2/0 PS 18IN 585H

## (undated) DEVICE — ALLEVYN TRACHEOSTOMY 3.5X3.5" CTN 10: Brand: ALLEVYN TRACHEOSTOMY

## (undated) DEVICE — GLV SURG SENSICARE PI MIC PF SZ7.5 LF STRL

## (undated) DEVICE — THE BITE BLOCK MAXI, LATEX FREE STRAP IS USED TO PROTECT THE ENDOSCOPE INSERTION TUBE FROM BEING BITTEN BY THE PATIENT.

## (undated) DEVICE — APPL CHLORAPREP TINTED 26ML TEAL

## (undated) DEVICE — DRESSING,OPTIFOAM,NON-ADHESIVE,4X4: Brand: MEDLINE

## (undated) DEVICE — PK MINOR SPLT 10

## (undated) DEVICE — GOWN,PREVENTION PLUS,XXLARGE,STERILE: Brand: MEDLINE

## (undated) DEVICE — SUT PROLN 2/0 PC3 8833H

## (undated) DEVICE — PERCUTANEOUS ENDOSCOPIC GASTROSTOMY KIT: Brand: ENDOVIVE SAFETY PEG KIT

## (undated) DEVICE — CVR HNDL LIGHT RIGID

## (undated) DEVICE — DRAINBAG,ANTI-REFLUX TOWER,L/F,2000ML,LL: Brand: MEDLINE